# Patient Record
Sex: FEMALE | Race: WHITE | NOT HISPANIC OR LATINO | URBAN - METROPOLITAN AREA
[De-identification: names, ages, dates, MRNs, and addresses within clinical notes are randomized per-mention and may not be internally consistent; named-entity substitution may affect disease eponyms.]

---

## 2017-01-18 ENCOUNTER — APPOINTMENT (RX ONLY)
Dept: URBAN - METROPOLITAN AREA CLINIC 156 | Facility: CLINIC | Age: 77
Setting detail: DERMATOLOGY
End: 2017-01-18

## 2017-01-18 VITALS
SYSTOLIC BLOOD PRESSURE: 135 MMHG | HEIGHT: 65.5 IN | DIASTOLIC BLOOD PRESSURE: 79 MMHG | HEART RATE: 70 BPM | WEIGHT: 150 LBS

## 2017-01-18 DIAGNOSIS — D22 MELANOCYTIC NEVI: ICD-10-CM

## 2017-01-18 DIAGNOSIS — L82.1 OTHER SEBORRHEIC KERATOSIS: ICD-10-CM

## 2017-01-18 DIAGNOSIS — L57.0 ACTINIC KERATOSIS: ICD-10-CM

## 2017-01-18 DIAGNOSIS — D18.0 HEMANGIOMA: ICD-10-CM

## 2017-01-18 PROBLEM — D22.5 MELANOCYTIC NEVI OF TRUNK: Status: ACTIVE | Noted: 2017-01-18

## 2017-01-18 PROBLEM — D18.01 HEMANGIOMA OF SKIN AND SUBCUTANEOUS TISSUE: Status: ACTIVE | Noted: 2017-01-18

## 2017-01-18 PROBLEM — I10 ESSENTIAL (PRIMARY) HYPERTENSION: Status: ACTIVE | Noted: 2017-01-18

## 2017-01-18 PROCEDURE — ? COUNSELING

## 2017-01-18 PROCEDURE — 17000 DESTRUCT PREMALG LESION: CPT

## 2017-01-18 PROCEDURE — 99214 OFFICE O/P EST MOD 30 MIN: CPT | Mod: 25

## 2017-01-18 PROCEDURE — ? LIQUID NITROGEN

## 2017-01-18 ASSESSMENT — LOCATION SIMPLE DESCRIPTION DERM
LOCATION SIMPLE: RIGHT BREAST
LOCATION SIMPLE: RIGHT CHEEK
LOCATION SIMPLE: LEFT FOREARM
LOCATION SIMPLE: RIGHT FOREARM
LOCATION SIMPLE: LEFT UPPER BACK
LOCATION SIMPLE: ABDOMEN
LOCATION SIMPLE: LEFT LOWER BACK

## 2017-01-18 ASSESSMENT — LOCATION DETAILED DESCRIPTION DERM
LOCATION DETAILED: LEFT SUPERIOR MEDIAL LOWER BACK
LOCATION DETAILED: RIGHT DISTAL DORSAL FOREARM
LOCATION DETAILED: RIGHT MEDIAL MANDIBULAR CHEEK
LOCATION DETAILED: LEFT DISTAL DORSAL FOREARM
LOCATION DETAILED: RIGHT LATERAL BREAST 6-7:00 REGION
LOCATION DETAILED: EPIGASTRIC SKIN
LOCATION DETAILED: LEFT SUPERIOR UPPER BACK

## 2017-01-18 ASSESSMENT — LOCATION ZONE DERM
LOCATION ZONE: FACE
LOCATION ZONE: ARM
LOCATION ZONE: TRUNK

## 2017-01-18 NOTE — PROCEDURE: LIQUID NITROGEN
Post-Care Instructions: I reviewed with the patient in detail post-care instructions. Patient is to wear sunprotection, and avoid picking at any of the treated lesions. Pt may apply Vaseline to crusted or scabbing areas.
Duration Of Freeze Thaw-Cycle (Seconds): 0
Render Post-Care Instructions In Note?: no
Detail Level: Detailed
Consent: The patient's consent was obtained including but not limited to risks of crusting, scabbing, blistering, scarring, darker or lighter pigmentary change, recurrence, incomplete removal and infection.
Number Of Freeze-Thaw Cycles: 1 freeze-thaw cycle

## 2017-12-19 ENCOUNTER — HOSPITAL ENCOUNTER (OUTPATIENT)
Dept: HOSPITAL 88 - MRI | Age: 77
End: 2017-12-19
Attending: FAMILY MEDICINE
Payer: MEDICARE

## 2017-12-19 DIAGNOSIS — M48.061: Primary | ICD-10-CM

## 2017-12-19 DIAGNOSIS — M51.36: ICD-10-CM

## 2017-12-19 PROCEDURE — 72148 MRI LUMBAR SPINE W/O DYE: CPT

## 2017-12-22 NOTE — DIAGNOSTIC IMAGING REPORT
Examination: MRI SPINE LUMBAR WITHOUT CONTRAST



History: 77-year-old female with chronic back pain radiating down the right

lower extremity.

Comparison studies: X-rays of lumbar spine performed November 26, 2017 and

December 31, 2010.



Technique: 

Sagittal, coronal  and axial T2 , sagittal T1 and STIR; axial  spin density

oblique.



Findings:



Number of lumbar vertebral bodies: Five.



Alignment: Normal lordosis. Mild leftward curvature centered at L3-L4.

Soft tissues: No T2 hyperintense inflammatory changes.

Posterior paraspinal soft tissues and muscles: No abnormality. 

Lower thoracic cord: Normal in signal and morphology. The tip of the conus is

at L1-L2.

Cauda equina: No masses.  No arachnoiditis.



Vertebrae: 

No fractures, infection or neoplasm.



Degenerative changes:



L1-L2:

Asymmetric to the right disc bulge results in mild canal stenosis and moderate

right and mild left neural foraminal narrowing.



L2-L3:

Asymmetric to the right disc bulge results in moderate right and mild left

neural foraminal narrowing and mild canal stenosis.



L3-L4:

Asymmetric to the right disc bulge, mild bilateral facet arthropathy and

prominent posterior epidural fat result in moderate right neural foraminal

narrowing and moderate canal stenosis. 

No left foraminal narrowing.



L4-L5:

Diffuse disc bulge with central disc protrusion and mild bilateral facet

arthropathy result in mild bilateral neural foraminal narrowing and severe

canal stenosis.



L5-S1:

Diffuse disc bulge and mild bilateral facet arthropathy result in moderate left

neural foraminal narrowing. 

No right foraminal or canal stenosis.



IMPRESSION: 



1.  Degenerative changes at L2-L5 S1 with severe canal stenosis at L4-L5,

moderate canal stenosis at L3-L4 and mild canal stenosis at L1-L2 and L2-L3.

2.  Moderate right foraminal narrowing from L1-L2 through L3-L4 and moderate

left foraminal narrowing at L5-S1.

3.  Mild leftward curvature centered at L3-L4.



Signed by: Dr. Nimo Vergara M.D. on 12/22/2017 3:59 PM

## 2018-10-08 ENCOUNTER — APPOINTMENT (RX ONLY)
Dept: URBAN - METROPOLITAN AREA CLINIC 156 | Facility: CLINIC | Age: 78
Setting detail: DERMATOLOGY
End: 2018-10-08

## 2018-10-08 ENCOUNTER — RX ONLY (OUTPATIENT)
Age: 78
Setting detail: RX ONLY
End: 2018-10-08

## 2018-10-08 VITALS
RESPIRATION RATE: 14 BRPM | DIASTOLIC BLOOD PRESSURE: 72 MMHG | SYSTOLIC BLOOD PRESSURE: 136 MMHG | WEIGHT: 140 LBS | HEART RATE: 78 BPM

## 2018-10-08 DIAGNOSIS — L24.9 IRRITANT CONTACT DERMATITIS, UNSPECIFIED CAUSE: ICD-10-CM

## 2018-10-08 DIAGNOSIS — B07.8 OTHER VIRAL WARTS: ICD-10-CM

## 2018-10-08 DIAGNOSIS — B35.1 TINEA UNGUIUM: ICD-10-CM

## 2018-10-08 DIAGNOSIS — L82.1 OTHER SEBORRHEIC KERATOSIS: ICD-10-CM

## 2018-10-08 DIAGNOSIS — D22 MELANOCYTIC NEVI: ICD-10-CM

## 2018-10-08 DIAGNOSIS — L57.0 ACTINIC KERATOSIS: ICD-10-CM

## 2018-10-08 PROBLEM — D48.5 NEOPLASM OF UNCERTAIN BEHAVIOR OF SKIN: Status: ACTIVE | Noted: 2018-10-08

## 2018-10-08 PROBLEM — D22.5 MELANOCYTIC NEVI OF TRUNK: Status: ACTIVE | Noted: 2018-10-08

## 2018-10-08 PROCEDURE — ? NAIL CLIPPING FOR PAS

## 2018-10-08 PROCEDURE — ? SHAVE REMOVAL

## 2018-10-08 PROCEDURE — 11100: CPT | Mod: 59

## 2018-10-08 PROCEDURE — 17000 DESTRUCT PREMALG LESION: CPT

## 2018-10-08 PROCEDURE — ? COUNSELING

## 2018-10-08 PROCEDURE — 99214 OFFICE O/P EST MOD 30 MIN: CPT | Mod: 25

## 2018-10-08 PROCEDURE — ? CONSULTATION FOR MOHS SURGERY

## 2018-10-08 PROCEDURE — ? PRESCRIPTION

## 2018-10-08 PROCEDURE — 17003 DESTRUCT PREMALG LES 2-14: CPT

## 2018-10-08 PROCEDURE — ? BIOPSY BY SHAVE METHOD

## 2018-10-08 PROCEDURE — 11301 SHAVE SKIN LESION 0.6-1.0 CM: CPT | Mod: 59

## 2018-10-08 PROCEDURE — ? LIQUID NITROGEN

## 2018-10-08 RX ORDER — TRIAMCINOLONE ACETONIDE 1 MG/G
CREAM TOPICAL
Qty: 1 | Refills: 1 | Status: ERX

## 2018-10-08 RX ORDER — TRIAMCINOLONE ACETONIDE 1 MG/G
CREAM TOPICAL
Qty: 1 | Refills: 2 | Status: ERX | COMMUNITY
Start: 2018-10-08

## 2018-10-08 RX ADMIN — TRIAMCINOLONE ACETONIDE: 1 CREAM TOPICAL at 00:00

## 2018-10-08 ASSESSMENT — LOCATION SIMPLE DESCRIPTION DERM
LOCATION SIMPLE: RIGHT CHEEK
LOCATION SIMPLE: NOSE
LOCATION SIMPLE: LEFT THIGH
LOCATION SIMPLE: RIGHT UPPER BACK
LOCATION SIMPLE: LEFT 2ND TOE
LOCATION SIMPLE: LOWER BACK

## 2018-10-08 ASSESSMENT — LOCATION ZONE DERM
LOCATION ZONE: TRUNK
LOCATION ZONE: FACE
LOCATION ZONE: LEG
LOCATION ZONE: NOSE
LOCATION ZONE: TOENAIL

## 2018-10-08 ASSESSMENT — LOCATION DETAILED DESCRIPTION DERM
LOCATION DETAILED: NASAL TIP
LOCATION DETAILED: RIGHT SUPERIOR MEDIAL UPPER BACK
LOCATION DETAILED: RIGHT SUPERIOR LATERAL BUCCAL CHEEK
LOCATION DETAILED: SUPERIOR LUMBAR SPINE
LOCATION DETAILED: LEFT ANTERIOR PROXIMAL THIGH
LOCATION DETAILED: RIGHT CENTRAL BUCCAL CHEEK
LOCATION DETAILED: RIGHT INFERIOR MEDIAL UPPER BACK
LOCATION DETAILED: LEFT 2ND TOENAIL

## 2018-10-08 NOTE — PROCEDURE: NAIL CLIPPING FOR PAS
Lab Facility: [id number]
Detail Level: Detailed
Add [id number] To Bill?: No
Billing Type: Third-Party Bill
Lab: [id number]

## 2018-10-08 NOTE — PROCEDURE: BIOPSY BY SHAVE METHOD
Cryotherapy Text: The wound bed was treated with cryotherapy after the biopsy was performed.
Silver Nitrate Text: The wound bed was treated with silver nitrate after the biopsy was performed.
Bill [id number] For Specimen Handling/Conveyance To Laboratory?: no
Detail Level: Detailed
Additional Anesthesia Volume In Cc (Will Not Render If 0): 0
Notification Instructions: Patient will be notified of biopsy results. However, patient instructed to call the office if not contacted within 2 weeks.
Electrodesiccation And Curettage Text: The wound bed was treated with electrodesiccation and curettage after the biopsy was performed.
Anesthesia Volume In Cc (Will Not Render If 0): 0.5
Type Of Destruction Used: Curettage
Consent: Verbal consent was obtained and risks were reviewed including but not limited to scarring, infection, bleeding, scabbing, incomplete removal, nerve damage and allergy to anesthesia.
Wound Care: Bacitracin
Biopsy Type: H and E
Dressing: bandage
Was A Bandage Applied: Yes
Curettage Text: The wound bed was treated with curettage after the biopsy was performed.
X Size Of Lesion In Cm: 0.6
Lab Facility: [id number]
Post-Care Instructions: I reviewed with the patient in detail post-care instructions. Patient to remove bandage in 24 hours. Patient may apply hydrogen peroxide soaks to remove any crusting.  Cleanse site with mild soap and water.  Apply vaseline ointment to site once a day and cover with bandage.
Biopsy Method: Personna blade
Anesthesia Type: 2% lidocaine with epinephrine
Hemostasis: Electrocautery and Aluminum Chloride
Electrodesiccation Text: The wound bed was treated with electrodesiccation after the biopsy was performed.
Lab: [id number]
Billing Type: Third-Party Bill
Depth Of Biopsy: dermis

## 2018-10-08 NOTE — PROCEDURE: SHAVE REMOVAL
Lab: [id number]
Post-Care Instructions: I reviewed with the patient in detail post-care instructions. Patient is to keep the biopsy site dry overnight, and then apply bacitracin twice daily until healed. Patient may apply hydrogen peroxide soaks to remove any crusting.
Bill For Surgical Tray: no
Medical Necessity Information: It is in your best interest to select a reason for this procedure from the list below. All of these items fulfill various CMS LCD requirements except the new and changing color options.
Wound Care: Bacitracin
Lab Facility: [id number]
Biopsy Method: Dermablade
Billing Type: Third-Party Bill
Was A Bandage Applied: Yes
Notification Instructions: Patient will be notified of biopsy results. However, patient instructed to call the office if not contacted within 2 weeks.
Path Notes (To The Dermatopathologist): Please check margins.
Anesthesia Volume In Cc: 2
Hemostasis: Electrocautery
Detail Level: Detailed
Anesthesia Type: 2% lidocaine with epinephrine
X Size Of Lesion In Cm (Optional): 0.7
Medical Necessity Clause: This procedure was medically necessary because the lesion that was treated was:
Consent was obtained from the patient. The risks and benefits to therapy were discussed in detail. Specifically, the risks of infection, scarring, bleeding, prolonged wound healing, incomplete removal, allergy to anesthesia, nerve injury and recurrence were addressed. Prior to the procedure, the treatment site was clearly identified and confirmed by the patient. All components of Universal Protocol/PAUSE Rule completed.
Size Of Lesion In Cm (Required): 1

## 2018-11-01 ENCOUNTER — APPOINTMENT (RX ONLY)
Dept: URBAN - METROPOLITAN AREA SURGERY CENTER 12 | Facility: SURGERY CENTER | Age: 78
Setting detail: DERMATOLOGY
End: 2018-11-01

## 2018-11-01 VITALS — RESPIRATION RATE: 16 BRPM | SYSTOLIC BLOOD PRESSURE: 147 MMHG | HEART RATE: 70 BPM | DIASTOLIC BLOOD PRESSURE: 89 MMHG

## 2018-11-01 VITALS
DIASTOLIC BLOOD PRESSURE: 82 MMHG | HEART RATE: 66 BPM | RESPIRATION RATE: 14 BRPM | WEIGHT: 145 LBS | SYSTOLIC BLOOD PRESSURE: 150 MMHG

## 2018-11-01 PROBLEM — D48.5 NEOPLASM OF UNCERTAIN BEHAVIOR OF SKIN: Status: ACTIVE | Noted: 2018-11-01

## 2018-11-01 PROBLEM — C44.319 BASAL CELL CARCINOMA OF SKIN OF OTHER PARTS OF FACE: Status: ACTIVE | Noted: 2018-11-01

## 2018-11-01 PROCEDURE — ? MOHS SURGERY

## 2018-11-01 PROCEDURE — 13131 CMPLX RPR F/C/C/M/N/AX/G/H/F: CPT

## 2018-11-01 PROCEDURE — ? NURSING SURGICAL NOTE

## 2018-11-01 PROCEDURE — 17311 MOHS 1 STAGE H/N/HF/G: CPT

## 2018-11-01 PROCEDURE — ? DISCHARGE ORDERS

## 2018-11-01 PROCEDURE — ? BIOPSY BY SHAVE METHOD

## 2018-11-01 PROCEDURE — 11100: CPT | Mod: 59

## 2018-11-01 NOTE — PROCEDURE: DISCHARGE ORDERS
Follow-Up: 7 days
Detail Level: Detailed
Discharge Destination: home
Discharge Under Care Of (Will Render 'self' If Left Blank): spouse

## 2018-11-01 NOTE — PROCEDURE: BIOPSY BY SHAVE METHOD
Depth Of Biopsy: dermis
Cryotherapy Text: The wound bed was treated with cryotherapy after the biopsy was performed.
Consent: Verbal consent was obtained and risks were reviewed including but not limited to scarring, infection, bleeding, scabbing, incomplete removal, nerve damage and allergy to anesthesia.
X Size Of Lesion In Cm: 0.2
Electrodesiccation And Curettage Text: The wound bed was treated with electrodesiccation and curettage after the biopsy was performed.
Bill For Surgical Tray: no
Lab: [id number]
Dressing: bandage
Electrodesiccation Text: The wound bed was treated with electrodesiccation after the biopsy was performed.
Biopsy Type: H and E
Was A Bandage Applied: Yes
Notification Instructions: Patient will be notified of biopsy results. However, patient instructed to call the office if not contacted within 2 weeks.
Post-Care Instructions: I reviewed with the patient in detail post-care instructions. Patient to remove bandage in 24 hours. Patient may apply hydrogen peroxide soaks to remove any crusting.  Cleanse site with mild soap and water.  Apply vaseline ointment to site once a day and cover with bandage.
Biopsy Method: Personna blade
Curettage Text: The wound bed was treated with curettage after the biopsy was performed.
Billing Type: Third-Party Bill
Hemostasis: Electrocautery
Wound Care: Bacitracin
Additional Anesthesia Volume In Cc (Will Not Render If 0): 0
Silver Nitrate Text: The wound bed was treated with silver nitrate after the biopsy was performed.
Anesthesia Volume In Cc (Will Not Render If 0): 0.5
Detail Level: Detailed
Anesthesia Type: 1% lidocaine with epinephrine
Type Of Destruction Used: Curettage
Lab Facility: [id number]

## 2018-11-01 NOTE — PROCEDURE: NURSING SURGICAL NOTE
Detail Level: Detailed
Anesthesia #1 Volume In Cc: 2
Patient Discharged To: Spouse
Site Marked?: Yes
Time 'time-Out' Performed: 2663
Preoperative Diagnosis (For...Diagnosis Name): Mohs Micrographic Surgery for
Time Given #1: 8766
Time Given #2: 7909
Anesthesia #4 Volume In Cc: 0
Anesthesia #1 Type: 1% lidocaine with epinephrine
Surgeon: Jess Burnham MD
Anesthesia #3 Volume In Cc: 1
Dicharge Condition: Stable
Proposed Procedure: Mohs Surgery and Repair
Asa Clasification: I
Discharge Instructions (Will Render On Patient Hand-Out): 1. Supplies- You will need the following: \\n       • Water & Peroxide      \\n       • Non-Adherent Dressing or Band-Aids \\n       • Q-Tips                      \\n       • Vaseline \\n2. Wound Care\\n➢ CLEAN wound once DAILY after the pressure dressing is removed. \\n      • Clean wound with Q-Tips soaked in ½ water, ½ hydrogen peroxide. \\n      • Do not reuse Q-Tips. \\n      • Remove all crusted or white/yellow material that can come off easily.\\n      • After cleaning, generously APPLY VASELINE with a clean Q-Tip.\\n➢ Keep bandage dry \\n➢ COVER WOUND with a bandaid OR non-adherent dressing (cut to size & tape)\\n  o Keep WRAP in place for 24 hours.\\n  o Keep pressure DRESSING dry and intact ☐ 24 hours  ☐ 48 hours\\n  o Leave STERI-STRIPS in place \\n      o until they fall off   \\n      o _________________\\nContinue wound care  \\n      o until sutures are removed  \\n      o until healed or as your doctor directs\\n  o Apply ice packs, 20 minutes on, 20 minutes off for the next 24-48 hours while awake.\\n  o If repair includes a skin graft and you smoke, discontinue smoking for 1 month after your graft. \\n3. Personal Hygiene\\n    A. Showers or baths are allowed as long as the bandage remains dry, as directed. After 24hrs, the sutures may get wet; do NOT soak in water (i.e. baths, sinks, hot tubs or swimming pools/lakes).\\n    B. Heavy lifting and exercise are not allowed until the sutures are removed and/or the wound is healed. \\n4. Prescriptions \\n➢ Unless the doctor states otherwise, take 1-2 Extra Strength Tylenol every 6hrs as needed for pain. \\n➢ Alcohol should be avoided for two days.\\n  o Your doctor has prescribed an antibiotic for you to begin taking today as directed. \\n  o Your doctor has prescribed a pain pill for you to take as directed. \\n5. Potential Post-operative complications\\n➢ INFECTION: Infection seldom occurs when the wound care instructions have been carefully followed. Signs of infection include increasing redness, increased warmth, increasing pain, and white/yellow/green discharge. Contact our office if you experience one of these symptoms. \\n➢ BLEEDING: Bleeding can occur following surgery. To reduce the possibility of bleeding, follow these instructions:\\n          A. Limit your activities for at least 24 hours\\n          B. Keep the surgery site elevated\\n          C. If surgery was on the face, head, or neck:\\n                 I. Avoid stooping or bending\\n                II. Avoid Straining to have bowel movement\\n               III. Sleep with your head and shoulders elevated on extra pillows\\nIF BLEEDING OCCURS, apply firm constant pressure on the bandage for 20 minutes. That will usually stop minor bleeding. If area continues to bleed, call our office [phone] during business hours. Call our emergency physician on-call number [phone] during evenings, weekends, and holidays.
Time Discharged: 1440

## 2018-11-01 NOTE — PROCEDURE: MOHS SURGERY
Consent (Lip)/Introductory Paragraph: The rationale for Mohs was explained to the patient and consent was obtained. The risks, benefits and alternatives to therapy were discussed in detail. Specifically, the risks of lip deformity, changes in the oral aperture, infection, scarring, bleeding, prolonged wound healing, incomplete removal, allergy to anesthesia, nerve injury and recurrence were addressed. Prior to the procedure, the treatment site was clearly identified and confirmed by the patient. All components of Universal Protocol/PAUSE Rule completed.
Mercedes Flap Text: The defect edges were debeveled with a #15 scalpel blade.  Given the location of the defect, shape of the defect and the proximity to free margins a Mercedes flap was deemed most appropriate.  Using a sterile surgical marker, an appropriate advancement flap was drawn incorporating the defect and placing the expected incisions within the relaxed skin tension lines where possible. The area thus outlined was incised deep to adipose tissue with a #15 scalpel blade.  The skin margins were undermined to an appropriate distance in all directions utilizing iris scissors.
Referred To Otolaryngology For Closure Text (Leave Blank If You Do Not Want): After obtaining clear surgical margins the patient was sent to otolaryngology for surgical repair.  The patient understands they will receive post-surgical care and follow-up from the referring physician's office.
Keystone Flap Text: The defect edges were debeveled with a #15 scalpel blade.  Given the location of the defect, shape of the defect a keystone flap was deemed most appropriate.  Using a sterile surgical marker, an appropriate keystone flap was drawn incorporating the defect, outlining the appropriate donor tissue and placing the expected incisions within the relaxed skin tension lines where possible. The area thus outlined was incised deep to adipose tissue with a #15 scalpel blade.  The skin margins were undermined to an appropriate distance in all directions around the primary defect and laterally outward around the flap utilizing iris scissors.
Full Thickness Lip Wedge Repair (Flap) Text: Given the location of the defect and the proximity to free margins a full thickness wedge repair was deemed most appropriate.  Using a sterile surgical marker, the appropriate repair was drawn incorporating the defect and placing the expected incisions perpendicular to the vermilion border.  The vermilion border was also meticulously outlined to ensure appropriate reapproximation during the repair.  The area thus outlined was incised through and through with a #15 scalpel blade.  The muscularis and dermis were reaproximated with deep sutures following hemostasis. Care was taken to realign the vermilion border before proceeding with the superficial closure.  Once the vermilion was realigned the superfical and mucosal closure was finished.
Quadrant Reporting?: no
O-Z Plasty Text: The defect edges were debeveled with a #15 scalpel blade.  Given the location of the defect, shape of the defect and the proximity to free margins an O-Z plasty (double transposition flap) was deemed most appropriate.  Using a sterile surgical marker, the appropriate transposition flaps were drawn incorporating the defect and placing the expected incisions within the relaxed skin tension lines where possible.    The area thus outlined was incised deep to adipose tissue with a #15 scalpel blade.  The skin margins were undermined to an appropriate distance in all directions utilizing iris scissors.  Hemostasis was achieved with electrocautery.  The flaps were then transposed into place, one clockwise and the other counterclockwise, and anchored with interrupted buried subcutaneous sutures.
Plastic Surgeon Procedure Text (E): After obtaining clear surgical margins the patient was sent to plastics for surgical repair.  The patient understands they will receive post-surgical care and follow-up from the referring physician's office.
Show Previous Accession Variable: Yes
Repair Type: Complex Repair
Stage 2: Number Of Blocks?: 0
W Plasty Text: The lesion was extirpated to the level of the fat with a #15 scalpel blade.  Given the location of the defect, shape of the defect and the proximity to free margins a W-plasty was deemed most appropriate for repair.  Using a sterile surgical marker, the appropriate transposition arms of the W-plasty were drawn incorporating the defect and placing the expected incisions within the relaxed skin tension lines where possible.    The area thus outlined was incised deep to adipose tissue with a #15 scalpel blade.  The skin margins were undermined to an appropriate distance in all directions utilizing iris scissors.  The opposing transposition arms were then transposed into place in opposite direction and anchored with interrupted buried subcutaneous sutures.
Non-Graft Cartilage Fenestration Text: The cartilage was fenestrated with a 2mm punch biopsy to help facilitate healing.
Melolabial Interpolation Flap Text: A decision was made to reconstruct the defect utilizing an interpolation axial flap and a staged reconstruction.  A telfa template was made of the defect.  This telfa template was then used to outline the melolabial interpolation flap.  The donor area for the pedicle flap was then injected with anesthesia.  The flap was excised through the skin and subcutaneous tissue down to the layer of the underlying musculature.  The pedicle flap was carefully excised within this deep plane to maintain its blood supply.  The edges of the donor site were undermined.   The donor site was closed in a primary fashion.  The pedicle was then rotated into position and sutured.  Once the tube was sutured into place, adequate blood supply was confirmed with blanching and refill.  The pedicle was then wrapped with xeroform gauze and dressed appropriately with a telfa and gauze bandage to ensure continued blood supply and protect the attached pedicle.
Double M-Plasty Complex Repair Preamble Text (Leave Blank If You Do Not Want): Extensive wide undermining was performed.
Graft Basting Suture (Optional): 5-0 Prolene
Bilobed Transposition Flap Text: The defect edges were debeveled with a #15 scalpel blade.  Given the location of the defect and the proximity to free margins a bilobed transposition flap was deemed most appropriate.  Using a sterile surgical marker, an appropriate bilobe flap drawn around the defect.    The area thus outlined was incised deep to adipose tissue with a #15 scalpel blade.  The skin margins were undermined to an appropriate distance in all directions utilizing iris scissors.
Mid-Level Procedure Text (C): After obtaining clear surgical margins the patient was sent to a mid-level provider for surgical repair.  The patient understands they will receive post-surgical care and follow-up from the mid-level provider.
Simple / Intermediate / Complex Repair - Final Wound Length In Cm: 2.5
Stage 1: Number Of Blocks?: 2
Purse String (Simple) Text: Given the location of the defect and the characteristics of the surrounding skin a pursestring closure was deemed most appropriate.  Undermining was performed circumfirentially around the surgical defect.  A purstring suture was then placed and tightened.
Otolaryngologist Procedure Text (D): After obtaining clear surgical margins the patient was sent to otolaryngology for surgical repair.  The patient understands they will receive post-surgical care and follow-up from the referring physician's office.
Stage 2: Additional Anesthesia Type: 1% lidocaine with epinephrine
Epidermal Autograft Text: The defect edges were debeveled with a #15 scalpel blade.  Given the location of the defect, shape of the defect and the proximity to free margins an epidermal autograft was deemed most appropriate.  Using a sterile surgical marker, the primary defect shape was transferred to the donor site. The epidermal graft was then harvested.  The skin graft was then placed in the primary defect and oriented appropriately.
Depth Of Tumor Invasion (For Histology): tumor not visualized (deep and peripheral margins are clear of tumor)
Transposition Flap Text: The defect edges were debeveled with a #15 scalpel blade.  Given the location of the defect and the proximity to free margins a transposition flap was deemed most appropriate.  Using a sterile surgical marker, an appropriate transposition flap was drawn incorporating the defect.    The area thus outlined was incised deep to adipose tissue with a #15 scalpel blade.  The skin margins were undermined to an appropriate distance in all directions utilizing iris scissors.
Burow's Advancement Flap Text: The defect edges were debeveled with a #15 scalpel blade.  Given the location of the defect and the proximity to free margins a Burow's advancement flap was deemed most appropriate.  Using a sterile surgical marker, the appropriate advancement flap was drawn incorporating the defect and placing the expected incisions within the relaxed skin tension lines where possible.    The area thus outlined was incised deep to adipose tissue with a #15 scalpel blade.  The skin margins were undermined to an appropriate distance in all directions utilizing iris scissors.
Number Of Stages: 1
Inflammation Suggestive Of Cancer Camouflage Histology Text: There was a dense lymphocytic infiltrate which prevented adequate histologic evaluation of adjacent structures.
Mohs Histo Method Verbiage: Each section was then chromacoded and processed in the Mohs lab using the Mohs protocol and submitted for frozen section.
Xenograft Text: The defect edges were debeveled with a #15 scalpel blade.  Given the location of the defect, shape of the defect and the proximity to free margins a xenograft was deemed most appropriate.  The graft was then trimmed to fit the size of the defect.  The graft was then placed in the primary defect and oriented appropriately.
Oculoplastic Surgeon Procedure Text (B): After obtaining clear surgical margins the patient was sent to oculoplastics for surgical repair.  The patient understands they will receive post-surgical care and follow-up from the referring physician's office.
Rotation Flap Text: The defect edges were debeveled with a #15 scalpel blade.  Given the location of the defect, shape of the defect and the proximity to free margins a rotation flap was deemed most appropriate.  Using a sterile surgical marker, an appropriate rotation flap was drawn incorporating the defect and placing the expected incisions within the relaxed skin tension lines where possible.    The area thus outlined was incised deep to adipose tissue with a #15 scalpel blade.  The skin margins were undermined to an appropriate distance in all directions utilizing iris scissors.
O-T Advancement Flap Text: The defect edges were debeveled with a #15 scalpel blade.  Given the location of the defect, shape of the defect and the proximity to free margins an O-T advancement flap was deemed most appropriate.  Using a sterile surgical marker, an appropriate advancement flap was drawn incorporating the defect and placing the expected incisions within the relaxed skin tension lines where possible.    The area thus outlined was incised deep to adipose tissue with a #15 scalpel blade.  The skin margins were undermined to an appropriate distance in all directions utilizing iris scissors.
Asc Procedure Text (C): After obtaining clear surgical margins the patient was sent to an ASC for surgical repair.  The patient understands they will receive post-surgical care and follow-up from the ASC physician.
Island Pedicle Flap With Canthal Suspension Text: The defect edges were debeveled with a #15 scalpel blade.  Given the location of the defect, shape of the defect and the proximity to free margins an island pedicle advancement flap was deemed most appropriate.  Using a sterile surgical marker, an appropriate advancement flap was drawn incorporating the defect, outlining the appropriate donor tissue and placing the expected incisions within the relaxed skin tension lines where possible. The area thus outlined was incised deep to adipose tissue with a #15 scalpel blade.  The skin margins were undermined to an appropriate distance in all directions around the primary defect and laterally outward around the island pedicle utilizing iris scissors.  There was minimal undermining beneath the pedicle flap. A suspension suture was placed in the canthal tendon to prevent tension and prevent ectropion.
Graft Donor Site Dermal Sutures (Optional): 5-0 Vicryl
Composite Graft Text: The defect edges were debeveled with a #15 scalpel blade.  Given the location of the defect, shape of the defect, the proximity to free margins and the fact the defect was full thickness a composite graft was deemed most appropriate.  The defect was outline and then transferred to the donor site.  A full thickness graft was then excised from the donor site. The graft was then placed in the primary defect, oriented appropriately and then sutured into place.  The secondary defect was then repaired using a primary closure.
Scc Histology Text: There are buds, cords, and irregularly shaped lobules of atypical keratinocytes emanating from the undersurface of the epidermis into the reticular dermis.  Many of their nuclei are large, hyperchromatic, and plenomorphic with scattered atypical mitotic figures.
Consent 1/Introductory Paragraph: The rationale for Mohs was explained to the patient and consent was obtained. The risks, benefits and alternatives to therapy were discussed in detail. Specifically, the risks of infection, scarring, bleeding, prolonged wound healing, incomplete removal, allergy to anesthesia, nerve injury and recurrence were addressed. Prior to the procedure, the treatment site was clearly identified and confirmed by the patient. All components of Universal Protocol/PAUSE Rule completed.
Z Plasty Text: The lesion was extirpated to the level of the fat with a #15 scalpel blade.  Given the location of the defect, shape of the defect and the proximity to free margins a Z-plasty was deemed most appropriate for repair.  Using a sterile surgical marker, the appropriate transposition arms of the Z-plasty were drawn incorporating the defect and placing the expected incisions within the relaxed skin tension lines where possible.    The area thus outlined was incised deep to adipose tissue with a #15 scalpel blade.  The skin margins were undermined to an appropriate distance in all directions utilizing iris scissors.  The opposing transposition arms were then transposed into place in opposite direction and anchored with interrupted buried subcutaneous sutures.
Ear Wedge Repair Text: A wedge excision was completed by carrying down an excision through the full thickness of the ear and cartilage with an inward facing Burow's triangle. The wound was then closed in a layered fashion.
Ear Star Wedge Flap Text: The defect edges were debeveled with a #15 blade scalpel.  Given the location of the defect and the proximity to free margins (helical rim) an ear star wedge flap was deemed most appropriate.  Using a sterile surgical marker, the appropriate flap was drawn incorporating the defect and placing the expected incisions between the helical rim and antihelix where possible.  The area thus outlined was incised through and through with a #15 scalpel blade.
Consent (Near Eyelid Margin)/Introductory Paragraph: The rationale for Mohs was explained to the patient and consent was obtained. The risks, benefits and alternatives to therapy were discussed in detail. Specifically, the risks of ectropion or eyelid deformity, infection, scarring, bleeding, prolonged wound healing, incomplete removal, allergy to anesthesia, nerve injury and recurrence were addressed. Prior to the procedure, the treatment site was clearly identified and confirmed by the patient. All components of Universal Protocol/PAUSE Rule completed.
Localized Dermabrasion Text: The patient was draped in routine manner.  Localized dermabrasion using 3 x 17 mm wire brush was performed in routine manner to papillary dermis. This spot dermabrasion is being performed to complete skin cancer reconstruction. It also will eliminate the other sun damaged precancerous cells that are known to be part of the regional effect of a lifetime's worth of sun exposure. This localized dermabrasion is therapeutic and should not be considered cosmetic in any regard.
Hemostasis: Electrocautery
Provider Procedure Text (C): After obtaining clear surgical margins the defect was repaired by another provider.
Area M Indication Text: Tumors in this location are included in Area M (cheek, forehead, scalp, neck, jawline and pretibial skin).  Mohs surgery is indicated for tumors in these anatomic locations.
Mid-Level Procedure Text (F): After obtaining clear surgical margins the patient was sent to a mid-level provider for surgical repair.  The patient understands they will receive post-surgical care and follow-up from the mid-level provider.
Surgical Defect Width In Cm (Optional): 0.8
Crescentic Advancement Flap Text: The defect edges were debeveled with a #15 scalpel blade.  Given the location of the defect and the proximity to free margins a crescentic advancement flap was deemed most appropriate.  Using a sterile surgical marker, the appropriate advancement flap was drawn incorporating the defect and placing the expected incisions within the relaxed skin tension lines where possible.    The area thus outlined was incised deep to adipose tissue with a #15 scalpel blade.  The skin margins were undermined to an appropriate distance in all directions utilizing iris scissors.
Oculoplastic Surgeon Procedure Text (A): After obtaining clear surgical margins the patient was sent to oculoplastics for surgical repair.  The patient understands they will receive post-surgical care and follow-up from the referring physician's office.
Alternatives Discussed Intro (Do Not Add Period): I discussed alternative treatments to Mohs surgery and specifically discussed the risks and benefits of
Area L Indication Text: Tumors in this location are included in Area L (trunk and extremities).  Mohs surgery is indicated for larger tumors, or tumors with aggressive histologic features, in these anatomic locations.
Epidermal Closure: simple interrupted
Suture Removal: 7 days
Dressing: pressure dressing with telfa
Rhombic Flap Text: The defect edges were debeveled with a #15 scalpel blade.  Given the location of the defect and the proximity to free margins a rhombic flap was deemed most appropriate.  Using a sterile surgical marker, an appropriate rhombic flap was drawn incorporating the defect.    The area thus outlined was incised deep to adipose tissue with a #15 scalpel blade.  The skin margins were undermined to an appropriate distance in all directions utilizing iris scissors.
O-T Plasty Text: The defect edges were debeveled with a #15 scalpel blade.  Given the location of the defect, shape of the defect and the proximity to free margins an O-T plasty was deemed most appropriate.  Using a sterile surgical marker, an appropriate O-T plasty was drawn incorporating the defect and placing the expected incisions within the relaxed skin tension lines where possible.    The area thus outlined was incised deep to adipose tissue with a #15 scalpel blade.  The skin margins were undermined to an appropriate distance in all directions utilizing iris scissors.
Advancement-Rotation Flap Text: The defect edges were debeveled with a #15 scalpel blade.  Given the location of the defect, shape of the defect and the proximity to free margins an advancement-rotation flap was deemed most appropriate.  Using a sterile surgical marker, an appropriate flap was drawn incorporating the defect and placing the expected incisions within the relaxed skin tension lines where possible. The area thus outlined was incised deep to adipose tissue with a #15 scalpel blade.  The skin margins were undermined to an appropriate distance in all directions utilizing iris scissors.
Complex Repair And Flap Additional Text (Will Appearing After The Standard Complex Repair Text): The complex repair was not sufficient to completely close the primary defect. The remaining additional defect was repaired with the flap mentioned below.
S Plasty Text: Given the location and shape of the defect, and the orientation of relaxed skin tension lines, an S-plasty was deemed most appropriate for repair.  Using a sterile surgical marker, the appropriate outline of the S-plasty was drawn, incorporating the defect and placing the expected incisions within the relaxed skin tension lines where possible.  The area thus outlined was incised deep to adipose tissue with a #15 scalpel blade.  The skin margins were undermined to an appropriate distance in all directions utilizing iris scissors. The skin flaps were advanced over the defect.  The opposing margins were then approximated with interrupted buried subcutaneous sutures.
Plastic Surgeon Procedure Text (D): After obtaining clear surgical margins the patient was sent to plastics for surgical repair.  The patient understands they will receive post-surgical care and follow-up from the referring physician's office.
Interpolation Flap Text: A decision was made to reconstruct the defect utilizing an interpolation axial flap and a staged reconstruction.  A telfa template was made of the defect.  This telfa template was then used to outline the interpolation flap.  The donor area for the pedicle flap was then injected with anesthesia.  The flap was excised through the skin and subcutaneous tissue down to the layer of the underlying musculature.  The interpolation flap was carefully excised within this deep plane to maintain its blood supply.  The edges of the donor site were undermined.   The donor site was closed in a primary fashion.  The pedicle was then rotated into position and sutured.  Once the tube was sutured into place, adequate blood supply was confirmed with blanching and refill.  The pedicle was then wrapped with xeroform gauze and dressed appropriately with a telfa and gauze bandage to ensure continued blood supply and protect the attached pedicle.
Consent (Temporal Branch)/Introductory Paragraph: The rationale for Mohs was explained to the patient and consent was obtained. The risks, benefits and alternatives to therapy were discussed in detail. Specifically, the risks of damage to the temporal branch of the facial nerve, infection, scarring, bleeding, prolonged wound healing, incomplete removal, allergy to anesthesia, and recurrence were addressed. Prior to the procedure, the treatment site was clearly identified and confirmed by the patient. All components of Universal Protocol/PAUSE Rule completed.
Double M-Plasty Intermediate Repair Preamble Text (Leave Blank If You Do Not Want): Undermining was performed with blunt dissection.
Postop Diagnosis: same
Location Indication Override (Is Already Calculated Based On Selected Body Location): Area M
Partial Purse String (Intermediate) Text: Given the location of the defect and the characteristics of the surrounding skin an intermediate purse string closure was deemed most appropriate.  Undermining was performed circumfirentially around the surgical defect.  A purse string suture was then placed and tightened. Wound tension only allowed a partial closure of the circular defect.
X Size Of Lesion In Cm (Optional): 0.6
Mohs Rapid Report Verbiage: The area of clinically evident tumor was marked with skin marking ink and appropriately hatched.  The initial incision was made following the Mohs approach through the skin.  The specimen was taken to the lab, divided into the necessary number of pieces, chromacoded and processed according to the Mohs protocol.  This was repeated in successive stages until a tumor free defect was achieved.
Mohs Method Verbiage: An incision at a 45 degree angle following the standard Mohs approach was done and the specimen was harvested as a microscopic controlled layer.
Bcc Infiltrative Histology Text: There were numerous aggregates of basaloid cells demonstrating an infiltrative pattern within a very fibrotic dermis.  Some of the aggregates have peripheral palisading of their nuclei with foci of artifactual clefts  the aggregates from the surrounding stroma
Banner Transposition Flap Text: The defect edges were debeveled with a #15 scalpel blade.  Given the location of the defect and the proximity to free margins a Banner transposition flap was deemed most appropriate.  Using a sterile surgical marker, an appropriate flap drawn around the defect. The area thus outlined was incised deep to adipose tissue with a #15 scalpel blade.  The skin margins were undermined to an appropriate distance in all directions utilizing iris scissors.
Wound Care (No Sutures): Bacitracin
Bi-Rhombic Flap Text: The defect edges were debeveled with a #15 scalpel blade.  Given the location of the defect and the proximity to free margins a bi-rhombic flap was deemed most appropriate.  Using a sterile surgical marker, an appropriate rhombic flap was drawn incorporating the defect. The area thus outlined was incised deep to adipose tissue with a #15 scalpel blade.  The skin margins were undermined to an appropriate distance in all directions utilizing iris scissors.
Medical Necessity Statement: Based on my medical judgement, Mohs surgery is the most appropriate treatment for this cancer compared to other treatments.
Surgical Defect Length In Cm (Optional): 1.2
Asc Procedure Text (B): After obtaining clear surgical margins the patient was sent to an ASC for surgical repair.  The patient understands they will receive post-surgical care and follow-up from the ASC physician.
Alar Island Pedicle Flap Text: The defect edges were debeveled with a #15 scalpel blade.  Given the location of the defect, shape of the defect and the proximity to the alar rim an island pedicle advancement flap was deemed most appropriate.  Using a sterile surgical marker, an appropriate advancement flap was drawn incorporating the defect, outlining the appropriate donor tissue and placing the expected incisions within the nasal ala running parallel to the alar rim. The area thus outlined was incised with a #15 scalpel blade.  The skin margins were undermined minimally to an appropriate distance in all directions around the primary defect and laterally outward around the island pedicle utilizing iris scissors.  There was minimal undermining beneath the pedicle flap.
Star Wedge Flap Text: The defect edges were debeveled with a #15 scalpel blade.  Given the location of the defect, shape of the defect and the proximity to free margins a star wedge flap was deemed most appropriate.  Using a sterile surgical marker, an appropriate rotation flap was drawn incorporating the defect and placing the expected incisions within the relaxed skin tension lines where possible. The area thus outlined was incised deep to adipose tissue with a #15 scalpel blade.  The skin margins were undermined to an appropriate distance in all directions utilizing iris scissors.
Home Suture Removal Text: Patient was provided instructions on removing sutures and will remove their sutures at home.  If they have any questions or difficulties they will call the office.
Posterior Auricular Interpolation Flap Text: A decision was made to reconstruct the defect utilizing an interpolation axial flap and a staged reconstruction.  A telfa template was made of the defect.  This telfa template was then used to outline the posterior auricular interpolation flap.  The donor area for the pedicle flap was then injected with anesthesia.  The flap was excised through the skin and subcutaneous tissue down to the layer of the underlying musculature.  The pedicle flap was carefully excised within this deep plane to maintain its blood supply.  The edges of the donor site were undermined.   The donor site was closed in a primary fashion.  The pedicle was then rotated into position and sutured.  Once the tube was sutured into place, adequate blood supply was confirmed with blanching and refill.  The pedicle was then wrapped with xeroform gauze and dressed appropriately with a telfa and gauze bandage to ensure continued blood supply and protect the attached pedicle.
Dermal Autograft Text: The defect edges were debeveled with a #15 scalpel blade.  Given the location of the defect, shape of the defect and the proximity to free margins a dermal autograft was deemed most appropriate.  Using a sterile surgical marker, the primary defect shape was transferred to the donor site. The area thus outlined was incised deep to adipose tissue with a #15 scalpel blade.  The harvested graft was then trimmed of adipose and epidermal tissue until only dermis was left.  The skin graft was then placed in the primary defect and oriented appropriately.
Complex Repair And Graft Additional Text (Will Appearing After The Standard Complex Repair Text): The complex repair was not sufficient to completely close the primary defect. The remaining additional defect was repaired with the graft mentioned below.
Consent (Nose)/Introductory Paragraph: The rationale for Mohs was explained to the patient and consent was obtained. The risks, benefits and alternatives to therapy were discussed in detail. Specifically, the risks of nasal deformity, changes in the flow of air through the nose, infection, scarring, bleeding, prolonged wound healing, incomplete removal, allergy to anesthesia, nerve injury and recurrence were addressed. Prior to the procedure, the treatment site was clearly identified and confirmed by the patient. All components of Universal Protocol/PAUSE Rule completed.
Spiral Flap Text: The defect edges were debeveled with a #15 scalpel blade.  Given the location of the defect, shape of the defect and the proximity to free margins a spiral flap was deemed most appropriate.  Using a sterile surgical marker, an appropriate rotation flap was drawn incorporating the defect and placing the expected incisions within the relaxed skin tension lines where possible. The area thus outlined was incised deep to adipose tissue with a #15 scalpel blade.  The skin margins were undermined to an appropriate distance in all directions utilizing iris scissors.
Cartilage Graft Text: The defect edges were debeveled with a #15 scalpel blade.  Given the location of the defect, shape of the defect, the fact the defect involved a full thickness cartilage defect a cartilage graft was deemed most appropriate.  An appropriate donor site was identified, cleansed, and anesthetized. The cartilage graft was then harvested and transferred to the recipient site, oriented appropriately and then sutured into place.  The secondary defect was then repaired using a primary closure.
Island Pedicle Flap Text: The defect edges were debeveled with a #15 scalpel blade.  Given the location of the defect, shape of the defect and the proximity to free margins an island pedicle advancement flap was deemed most appropriate.  Using a sterile surgical marker, an appropriate advancement flap was drawn incorporating the defect, outlining the appropriate donor tissue and placing the expected incisions within the relaxed skin tension lines where possible.    The area thus outlined was incised deep to adipose tissue with a #15 scalpel blade.  The skin margins were undermined to an appropriate distance in all directions around the primary defect and laterally outward around the island pedicle utilizing iris scissors.  There was minimal undermining beneath the pedicle flap.
Skin Substitute Text: The defect edges were debeveled with a #15 scalpel blade.  Given the location of the defect, shape of the defect and the proximity to free margins a skin substitute graft was deemed most appropriate.  The graft material was trimmed to fit the size of the defect. The graft was then placed in the primary defect and oriented appropriately.
Advancement Flap (Single) Text: The defect edges were debeveled with a #15 scalpel blade.  Given the location of the defect and the proximity to free margins a single advancement flap was deemed most appropriate.  Using a sterile surgical marker, an appropriate advancement flap was drawn incorporating the defect and placing the expected incisions within the relaxed skin tension lines where possible.    The area thus outlined was incised deep to adipose tissue with a #15 scalpel blade.  The skin margins were undermined to an appropriate distance in all directions utilizing iris scissors.
Melolabial Transposition Flap Text: The defect edges were debeveled with a #15 scalpel blade.  Given the location of the defect and the proximity to free margins a melolabial flap was deemed most appropriate.  Using a sterile surgical marker, an appropriate melolabial transposition flap was drawn incorporating the defect.    The area thus outlined was incised deep to adipose tissue with a #15 scalpel blade.  The skin margins were undermined to an appropriate distance in all directions utilizing iris scissors.
Previous Accession (Optional): [id number]
A-T Advancement Flap Text: The defect edges were debeveled with a #15 scalpel blade.  Given the location of the defect, shape of the defect and the proximity to free margins an A-T advancement flap was deemed most appropriate.  Using a sterile surgical marker, an appropriate advancement flap was drawn incorporating the defect and placing the expected incisions within the relaxed skin tension lines where possible.    The area thus outlined was incised deep to adipose tissue with a #15 scalpel blade.  The skin margins were undermined to an appropriate distance in all directions utilizing iris scissors.
Closure 4 Information: This tab is for additional flaps and grafts above and beyond our usual structured repairs.  Please note if you enter information here it will not currently bill and you will need to add the billing information manually.
Post-Care Instructions: I reviewed with the patient in detail post-care instructions. Patient is not to engage in any heavy lifting, exercise, or swimming for the next 14 days. Should the patient develop any fevers, chills, bleeding, severe pain patient will contact the office immediately.
Scc In Situ Histology Text: There is full thickness atypia and loss of normal architectural pattern within the moderately thickened epidermis.  Many of the keratinocytes have large, hyperchromatic, or pleomorphic nuclei.  Scattered dyskeratotic cells and atypical mitotic figures are also present within the epidermis.
No Repair - Repaired With Adjacent Surgical Defect Text (Leave Blank If You Do Not Want): After obtaining clear surgical margins the defect was repaired concurrently with another surgical defect which was in close approximation.
Helical Rim Advancement Flap Text: The defect edges were debeveled with a #15 blade scalpel.  Given the location of the defect and the proximity to free margins (helical rim) a double helical rim advancement flap was deemed most appropriate.  Using a sterile surgical marker, the appropriate advancement flaps were drawn incorporating the defect and placing the expected incisions between the helical rim and antihelix where possible.  The area thus outlined was incised through and through with a #15 scalpel blade.  With a skin hook and iris scissors, the flaps were gently and sharply undermined and freed up.
V-Y Plasty Text: The defect edges were debeveled with a #15 scalpel blade.  Given the location of the defect, shape of the defect and the proximity to free margins an V-Y advancement flap was deemed most appropriate.  Using a sterile surgical marker, an appropriate advancement flap was drawn incorporating the defect and placing the expected incisions within the relaxed skin tension lines where possible.    The area thus outlined was incised deep to adipose tissue with a #15 scalpel blade.  The skin margins were undermined to an appropriate distance in all directions utilizing iris scissors.
Bcc Histology Text: There were numerous aggregates of basaloid cells with hyperchromatic nuclei and scant cytoplasm.  The aggregates have peripheral palisading of their nuclei and are embedded in a fibrotic and myxoid stroma.
V-Y Flap Text: The defect edges were debeveled with a #15 scalpel blade.  Given the location of the defect, shape of the defect and the proximity to free margins a V-Y flap was deemed most appropriate.  Using a sterile surgical marker, an appropriate advancement flap was drawn incorporating the defect and placing the expected incisions within the relaxed skin tension lines where possible.    The area thus outlined was incised deep to adipose tissue with a #15 scalpel blade.  The skin margins were undermined to an appropriate distance in all directions utilizing iris scissors.
Partial Purse String (Simple) Text: Given the location of the defect and the characteristics of the surrounding skin a simple purse string closure was deemed most appropriate.  Undermining was performed circumfirentially around the surgical defect.  A purse string suture was then placed and tightened. Wound tension only allowed a partial closure of the circular defect.
Consent (Ear)/Introductory Paragraph: The rationale for Mohs was explained to the patient and consent was obtained. The risks, benefits and alternatives to therapy were discussed in detail. Specifically, the risks of ear deformity, infection, scarring, bleeding, prolonged wound healing, incomplete removal, allergy to anesthesia, nerve injury and recurrence were addressed. Prior to the procedure, the treatment site was clearly identified and confirmed by the patient. All components of Universal Protocol/PAUSE Rule completed.
Paramedian Forehead Flap Text: A decision was made to reconstruct the defect utilizing an interpolation axial flap and a staged reconstruction.  A telfa template was made of the defect.  This telfa template was then used to outline the paramedian forehead pedicle flap.  The donor area for the pedicle flap was then injected with anesthesia.  The flap was excised through the skin and subcutaneous tissue down to the layer of the underlying musculature.  The pedicle flap was carefully excised within this deep plane to maintain its blood supply.  The edges of the donor site were undermined.   The donor site was closed in a primary fashion.  The pedicle was then rotated into position and sutured.  Once the tube was sutured into place, adequate blood supply was confirmed with blanching and refill.  The pedicle was then wrapped with xeroform gauze and dressed appropriately with a telfa and gauze bandage to ensure continued blood supply and protect the attached pedicle.
Bilobed Flap Text: The defect edges were debeveled with a #15 scalpel blade.  Given the location of the defect and the proximity to free margins a bilobe flap was deemed most appropriate.  Using a sterile surgical marker, an appropriate bilobe flap drawn around the defect.    The area thus outlined was incised deep to adipose tissue with a #15 scalpel blade.  The skin margins were undermined to an appropriate distance in all directions utilizing iris scissors.
Consent (Marginal Mandibular)/Introductory Paragraph: The rationale for Mohs was explained to the patient and consent was obtained. The risks, benefits and alternatives to therapy were discussed in detail. Specifically, the risks of damage to the marginal mandibular branch of the facial nerve, infection, scarring, bleeding, prolonged wound healing, incomplete removal, allergy to anesthesia, and recurrence were addressed. Prior to the procedure, the treatment site was clearly identified and confirmed by the patient. All components of Universal Protocol/PAUSE Rule completed.
Cheek-To-Nose Interpolation Flap Text: A decision was made to reconstruct the defect utilizing an interpolation axial flap and a staged reconstruction.  A telfa template was made of the defect.  This telfa template was then used to outline the Cheek-To-Nose Interpolation flap.  The donor area for the pedicle flap was then injected with anesthesia.  The flap was excised through the skin and subcutaneous tissue down to the layer of the underlying musculature.  The interpolation flap was carefully excised within this deep plane to maintain its blood supply.  The edges of the donor site were undermined.   The donor site was closed in a primary fashion.  The pedicle was then rotated into position and sutured.  Once the tube was sutured into place, adequate blood supply was confirmed with blanching and refill.  The pedicle was then wrapped with xeroform gauze and dressed appropriately with a telfa and gauze bandage to ensure continued blood supply and protect the attached pedicle.
Surgeon: Jess Burnham MD
Initial Size Of Lesion: 0.7
Split-Thickness Skin Graft Text: The defect edges were debeveled with a #15 scalpel blade.  Given the location of the defect, shape of the defect and the proximity to free margins a split thickness skin graft was deemed most appropriate.  Using a sterile surgical marker, the primary defect shape was transferred to the donor site. The split thickness graft was then harvested.  The skin graft was then placed in the primary defect and oriented appropriately.
Closure 2 Information: This tab is for additional flaps and grafts, including complex repair and grafts and complex repair and flaps. You can also specify a different location for the additional defect, if the location is the same you do not need to select a new one. We will insert the automated text for the repair you select below just as we do for solitary flaps and grafts. Please note that at this time if you select a location with a different insurance zone you will need to override the ICD10 and CPT if appropriate.
Double Island Pedicle Flap Text: The defect edges were debeveled with a #15 scalpel blade.  Given the location of the defect, shape of the defect and the proximity to free margins a double island pedicle advancement flap was deemed most appropriate.  Using a sterile surgical marker, an appropriate advancement flap was drawn incorporating the defect, outlining the appropriate donor tissue and placing the expected incisions within the relaxed skin tension lines where possible.    The area thus outlined was incised deep to adipose tissue with a #15 scalpel blade.  The skin margins were undermined to an appropriate distance in all directions around the primary defect and laterally outward around the island pedicle utilizing iris scissors.  There was minimal undermining beneath the pedicle flap.
Subsequent Stages Histo Method Verbiage: Using a similar technique to that described above, a thin layer of tissue was removed from all areas where tumor was visible on the previous stage.  The tissue was again oriented, mapped, dyed, and processed as above.
Unna Boot Text: An Unna boot was placed to help immobilize the limb and facilitate more rapid healing.
Secondary Intention Text (Leave Blank If You Do Not Want): The defect will heal with secondary intention.
Modified Advancement Flap Text: The defect edges were debeveled with a #15 scalpel blade.  Given the location of the defect, shape of the defect and the proximity to free margins a modified advancement flap was deemed most appropriate.  Using a sterile surgical marker, an appropriate advancement flap was drawn incorporating the defect and placing the expected incisions within the relaxed skin tension lines where possible.    The area thus outlined was incised deep to adipose tissue with a #15 scalpel blade.  The skin margins were undermined to an appropriate distance in all directions utilizing iris scissors.
Dorsal Nasal Flap Text: The defect edges were debeveled with a #15 scalpel blade.  Given the location of the defect and the proximity to free margins a dorsal nasal flap was deemed most appropriate.  Using a sterile surgical marker, an appropriate dorsal nasal flap was drawn around the defect.    The area thus outlined was incised deep to adipose tissue with a #15 scalpel blade.  The skin margins were undermined to an appropriate distance in all directions utilizing iris scissors.
Same Histology In Subsequent Stages Text: The pattern and morphology of the tumor is as described in the first stage.
Island Pedicle Flap-Requiring Vessel Identification Text: The defect edges were debeveled with a #15 scalpel blade.  Given the location of the defect, shape of the defect and the proximity to free margins an island pedicle advancement flap was deemed most appropriate.  Using a sterile surgical marker, an appropriate advancement flap was drawn, based on the axial vessel mentioned above, incorporating the defect, outlining the appropriate donor tissue and placing the expected incisions within the relaxed skin tension lines where possible.    The area thus outlined was incised deep to adipose tissue with a #15 scalpel blade.  The skin margins were undermined to an appropriate distance in all directions around the primary defect and laterally outward around the island pedicle utilizing iris scissors.  There was minimal undermining beneath the pedicle flap.
Graft Donor Site Bandage (Optional-Leave Blank If You Don't Want In Note): Telfa and a pressure bandage were applied to the donor site.
Graft Cartilage Fenestration Text: The cartilage was fenestrated with a 2mm punch biopsy to help facilitate graft survival and healing.
Bilateral Helical Rim Advancement Flap Text: The defect edges were debeveled with a #15 blade scalpel.  Given the location of the defect and the proximity to free margins (helical rim) a bilateral helical rim advancement flap was deemed most appropriate.  Using a sterile surgical marker, the appropriate advancement flaps were drawn incorporating the defect and placing the expected incisions between the helical rim and antihelix where possible.  The area thus outlined was incised through and through with a #15 scalpel blade.  With a skin hook and iris scissors, the flaps were gently and sharply undermined and freed up.
Consent Type: Consent 1 (Standard)
Dressing (No Sutures): dry sterile dressing
Trilobed Flap Text: The defect edges were debeveled with a #15 scalpel blade.  Given the location of the defect and the proximity to free margins a trilobed flap was deemed most appropriate.  Using a sterile surgical marker, an appropriate trilobed flap drawn around the defect.    The area thus outlined was incised deep to adipose tissue with a #15 scalpel blade.  The skin margins were undermined to an appropriate distance in all directions utilizing iris scissors.
Advancement Flap (Double) Text: The defect edges were debeveled with a #15 scalpel blade.  Given the location of the defect and the proximity to free margins a double advancement flap was deemed most appropriate.  Using a sterile surgical marker, the appropriate advancement flaps were drawn incorporating the defect and placing the expected incisions within the relaxed skin tension lines where possible.    The area thus outlined was incised deep to adipose tissue with a #15 scalpel blade.  The skin margins were undermined to an appropriate distance in all directions utilizing iris scissors.
Purse String (Intermediate) Text: Given the location of the defect and the characteristics of the surrounding skin a pursestring intermediate closure was deemed most appropriate.  Undermining was performed circumfirentially around the surgical defect.  A purstring suture was then placed and tightened.
Bcc Superficial Histology Text: There are aggregates of basaloid cells with hyperchromatic nuclei and scant cytoplasm which are connected to the undersuface of the epidermis.  The aggregates have peripheral palisading of their nuclei and are embedded in a fibrotic and myxoid stroma
Cheiloplasty (Less Than 50%) Text: A decision was made to reconstruct the defect with a  cheiloplasty.  The defect was undermined extensively.  Additional obicularis oris muscle was excised with a 15 blade scalpel.  The defect was converted into a full thickness wedge, of less than 50% of the vertical height of the lip, to facilite a better cosmetic result.  Small vessels were then tied off with 5-0 monocyrl. The obicularis oris, superficial fascia, adipose and dermis were then reapproximated.  After the deeper layers were approximated the epidermis was reapproximated with particular care given to realign the vermilion border.
Eye Protection Verbiage: Before proceeding with the stage, a plastic scleral shield was inserted. The globe was anesthetized with a few drops of 1% lidocaine with 1:100,000 epinephrine. Then, an appropriate sized scleral shield was chosen and coated with lacrilube ointment. The shield was gently inserted and left in place for the duration of each stage. After the stage was completed, the shield was gently removed.
Mohs Case Number: 
Mastoid Interpolation Flap Text: A decision was made to reconstruct the defect utilizing an interpolation axial flap and a staged reconstruction.  A telfa template was made of the defect.  This telfa template was then used to outline the mastoid interpolation flap.  The donor area for the pedicle flap was then injected with anesthesia.  The flap was excised through the skin and subcutaneous tissue down to the layer of the underlying musculature.  The pedicle flap was carefully excised within this deep plane to maintain its blood supply.  The edges of the donor site were undermined.   The donor site was closed in a primary fashion.  The pedicle was then rotated into position and sutured.  Once the tube was sutured into place, adequate blood supply was confirmed with blanching and refill.  The pedicle was then wrapped with xeroform gauze and dressed appropriately with a telfa and gauze bandage to ensure continued blood supply and protect the attached pedicle.
Ftsg Text: The defect edges were debeveled with a #15 scalpel blade.  Given the location of the defect, shape of the defect and the proximity to free margins a full thickness skin graft was deemed most appropriate.  Using a sterile surgical marker, the primary defect shape was transferred to the donor site. The area thus outlined was incised deep to adipose tissue with a #15 scalpel blade.  The harvested graft was then trimmed of adipose tissue until only dermis and epidermis was left.  The skin margins of the secondary defect were undermined to an appropriate distance in all directions utilizing iris scissors.  The secondary defect was closed with interrupted buried subcutaneous sutures.  The skin edges were then re-apposed with running  sutures.  The skin graft was then placed in the primary defect and oriented appropriately.
Closure 3 Information: This tab is for additional flaps and grafts above and beyond our usual structured repairs.  Please note if you enter information here it will not currently bill and you will need to add the billing information manually.
Estimated Blood Loss (Cc): minimal
Detail Level: Detailed
Consent 3/Introductory Paragraph: I gave the patient a chance to ask questions they had about the procedure.  Following this I explained the Mohs procedure and consent was obtained. The risks, benefits and alternatives to therapy were discussed in detail. Specifically, the risks of infection, scarring, bleeding, prolonged wound healing, incomplete removal, allergy to anesthesia, nerve injury and recurrence were addressed. Prior to the procedure, the treatment site was clearly identified and confirmed by the patient. All components of Universal Protocol/PAUSE Rule completed.
Consent 2/Introductory Paragraph: Mohs surgery was explained to the patient and consent was obtained. The risks, benefits and alternatives to therapy were discussed in detail. Specifically, the risks of infection, scarring, bleeding, prolonged wound healing, incomplete removal, allergy to anesthesia, nerve injury and recurrence were addressed. Prior to the procedure, the treatment site was clearly identified and confirmed by the patient. All components of Universal Protocol/PAUSE Rule completed.
Surgeon/Pathologist Verbiage (Will Incorporate Name Of Surgeon From Intro If Not Blank): operated in two distinct and integrated capacities as the surgeon and pathologist.
Hatchet Flap Text: The defect edges were debeveled with a #15 scalpel blade.  Given the location of the defect, shape of the defect and the proximity to free margins a hatchet flap was deemed most appropriate.  Using a sterile surgical marker, an appropriate hatchet flap was drawn incorporating the defect and placing the expected incisions within the relaxed skin tension lines where possible.    The area thus outlined was incised deep to adipose tissue with a #15 scalpel blade.  The skin margins were undermined to an appropriate distance in all directions utilizing iris scissors.
No Residual Tumor Seen Histology Text: There were no malignant cells seen in the sections examined.
Area H Indication Text: Tumors in this location are included in Area H (eyelids, eyebrows, nose, lips, chin, ear, pre-auricular, post-auricular, temple, genitalia, hands, feet, ankles and areola).  Tissue conservation is critical in these anatomic locations.
Mucosal Advancement Flap Text: Given the location of the defect, shape of the defect and the proximity to free margins a mucosal advancement flap was deemed most appropriate. Incisions were made with a 15 blade scalpel in the appropriate fashion along the cutaneous vermilion border and the mucosal lip. The remaining actinically damaged mucosal tissue was excised.  The mucosal advancement flap was then elevated to the gingival sulcus with care taken to preserve the neurovascular structures and advanced into the primary defect. Care was taken to ensure that precise realignment of the vermilion border was achieved.
Mauc Instructions: By selecting yes to the question below the MAUC number will be added into the note.  This will be calculated automatically based on the diagnosis chosen, the size entered, the body zone selected (H,M,L) and the specific indications you chose. You will also have the option to override the Mohs AUC if you disagree with the automatically calculated number and this option is found in the Case Summary tab.
H Plasty Text: Given the location of the defect, shape of the defect and the proximity to free margins a H-plasty was deemed most appropriate for repair.  Using a sterile surgical marker, the appropriate advancement arms of the H-plasty were drawn incorporating the defect and placing the expected incisions within the relaxed skin tension lines where possible. The area thus outlined was incised deep to adipose tissue with a #15 scalpel blade. The skin margins were undermined to an appropriate distance in all directions utilizing iris scissors.  The opposing advancement arms were then advanced into place in opposite direction and anchored with interrupted buried subcutaneous sutures.
Donor Site Anesthesia Type: same as repair anesthesia
Muscle Hinge Flap Text: The defect edges were debeveled with a #15 scalpel blade.  Given the size, depth and location of the defect and the proximity to free margins a muscle hinge flap was deemed most appropriate.  Using a sterile surgical marker, an appropriate hinge flap was drawn incorporating the defect. The area thus outlined was incised with a #15 scalpel blade.  The skin margins were undermined to an appropriate distance in all directions utilizing iris scissors.
Surgeon Performing Repair (Optional): Jess Burnham M.D.
Tarsorrhaphy Text: A tarsorrhaphy was performed using Frost sutures.
Tissue Cultured Epidermal Autograft Text: The defect edges were debeveled with a #15 scalpel blade.  Given the location of the defect, shape of the defect and the proximity to free margins a tissue cultured epidermal autograft was deemed most appropriate.  The graft was then trimmed to fit the size of the defect.  The graft was then placed in the primary defect and oriented appropriately.
O-L Flap Text: The defect edges were debeveled with a #15 scalpel blade.  Given the location of the defect, shape of the defect and the proximity to free margins an O-L flap was deemed most appropriate.  Using a sterile surgical marker, an appropriate advancement flap was drawn incorporating the defect and placing the expected incisions within the relaxed skin tension lines where possible.    The area thus outlined was incised deep to adipose tissue with a #15 scalpel blade.  The skin margins were undermined to an appropriate distance in all directions utilizing iris scissors.
Cheiloplasty (Complex) Text: A decision was made to reconstruct the defect with a  cheiloplasty.  The defect was undermined extensively.  Additional obicularis oris muscle was excised with a 15 blade scalpel.  The defect was converted into a full thickness wedge to facilite a better cosmetic result.  Small vessels were then tied off with 5-0 monocyrl. The obicularis oris, superficial fascia, adipose and dermis were then reapproximated.  After the deeper layers were approximated the epidermis was reapproximated with particular care given to realign the vermilion border.
Manual Repair Warning Statement: We plan on removing the manually selected variable below in favor of our much easier automatic structured text blocks found in the previous tab. We decided to do this to help make the flow better and give you the full power of structured data. Manual selection is never going to be ideal in our platform and I would encourage you to avoid using manual selection from this point on, especially since I will be sunsetting this feature. It is important that you do one of two things with the customized text below. First, you can save all of the text in a word file so you can have it for future reference. Second, transfer the text to the appropriate area in the Library tab. Lastly, if there is a flap or graft type which we do not have you need to let us know right away so I can add it in before the variable is hidden. No need to panic, we plan to give you roughly 6 months to make the change.
Cheek Interpolation Flap Text: A decision was made to reconstruct the defect utilizing an interpolation axial flap and a staged reconstruction.  A telfa template was made of the defect.  This telfa template was then used to outline the Cheek Interpolation flap.  The donor area for the pedicle flap was then injected with anesthesia.  The flap was excised through the skin and subcutaneous tissue down to the layer of the underlying musculature.  The interpolation flap was carefully excised within this deep plane to maintain its blood supply.  The edges of the donor site were undermined.   The donor site was closed in a primary fashion.  The pedicle was then rotated into position and sutured.  Once the tube was sutured into place, adequate blood supply was confirmed with blanching and refill.  The pedicle was then wrapped with xeroform gauze and dressed appropriately with a telfa and gauze bandage to ensure continued blood supply and protect the attached pedicle.
Consent (Scalp)/Introductory Paragraph: The rationale for Mohs was explained to the patient and consent was obtained. The risks, benefits and alternatives to therapy were discussed in detail. Specifically, the risks of changes in hair growth pattern secondary to repair, infection, scarring, bleeding, prolonged wound healing, incomplete removal, allergy to anesthesia, nerve injury and recurrence were addressed. Prior to the procedure, the treatment site was clearly identified and confirmed by the patient. All components of Universal Protocol/PAUSE Rule completed.
Consent (Spinal Accessory)/Introductory Paragraph: The rationale for Mohs was explained to the patient and consent was obtained. The risks, benefits and alternatives to therapy were discussed in detail. Specifically, the risks of damage to the spinal accessory nerve, infection, scarring, bleeding, prolonged wound healing, incomplete removal, allergy to anesthesia, and recurrence were addressed. Prior to the procedure, the treatment site was clearly identified and confirmed by the patient. All components of Universal Protocol/PAUSE Rule completed.

## 2018-11-08 ENCOUNTER — APPOINTMENT (RX ONLY)
Dept: URBAN - METROPOLITAN AREA CLINIC 158 | Facility: CLINIC | Age: 78
Setting detail: DERMATOLOGY
End: 2018-11-08

## 2018-11-08 DIAGNOSIS — Z48.02 ENCOUNTER FOR REMOVAL OF SUTURES: ICD-10-CM

## 2018-11-08 PROCEDURE — ? SUTURE REMOVAL (GLOBAL PERIOD)

## 2018-11-08 ASSESSMENT — LOCATION ZONE DERM: LOCATION ZONE: FACE

## 2018-11-08 ASSESSMENT — LOCATION DETAILED DESCRIPTION DERM: LOCATION DETAILED: RIGHT FOREHEAD

## 2018-11-08 ASSESSMENT — LOCATION SIMPLE DESCRIPTION DERM: LOCATION SIMPLE: RIGHT FOREHEAD

## 2018-11-08 NOTE — PROCEDURE: SUTURE REMOVAL (GLOBAL PERIOD)
Detail Level: Detailed
Add 60111 Cpt? (Important Note: In 2017 The Use Of 93890 Is Being Tracked By Cms To Determine Future Global Period Reimbursement For Global Periods): no

## 2019-02-13 ENCOUNTER — HOSPITAL ENCOUNTER (OUTPATIENT)
Dept: HOSPITAL 88 - OR | Age: 79
Discharge: HOME | End: 2019-02-13
Attending: INTERNAL MEDICINE
Payer: MEDICARE

## 2019-02-13 VITALS — DIASTOLIC BLOOD PRESSURE: 58 MMHG | SYSTOLIC BLOOD PRESSURE: 110 MMHG
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
## 2019-02-13 DIAGNOSIS — E78.00: ICD-10-CM

## 2019-02-13 DIAGNOSIS — I10: ICD-10-CM

## 2019-02-13 DIAGNOSIS — Z01.810: ICD-10-CM

## 2019-02-13 DIAGNOSIS — K44.9: ICD-10-CM

## 2019-02-13 DIAGNOSIS — J44.9: ICD-10-CM

## 2019-02-13 DIAGNOSIS — K20.9: ICD-10-CM

## 2019-02-13 DIAGNOSIS — K29.70: Primary | ICD-10-CM

## 2019-02-13 DIAGNOSIS — T81.89XA: ICD-10-CM

## 2019-02-13 DIAGNOSIS — K29.80: ICD-10-CM

## 2019-02-13 DIAGNOSIS — Z01.812: ICD-10-CM

## 2019-02-13 DIAGNOSIS — M19.90: ICD-10-CM

## 2019-02-13 LAB
BASOPHILS # BLD AUTO: 0.1 10*3/UL (ref 0–0.1)
BASOPHILS NFR BLD AUTO: 0.7 % (ref 0–1)
DEPRECATED NEUTROPHILS # BLD AUTO: 5.2 10*3/UL (ref 2.1–6.9)
EOSINOPHIL # BLD AUTO: 0.6 10*3/UL (ref 0–0.4)
EOSINOPHIL NFR BLD AUTO: 7.6 % (ref 0–6)
ERYTHROCYTE [DISTWIDTH] IN CORD BLOOD: 15.5 % (ref 11.7–14.4)
HCT VFR BLD AUTO: 35.7 % (ref 34.2–44.1)
HGB BLD-MCNC: 11.1 G/DL (ref 12–16)
LYMPHOCYTES # BLD: 1.6 10*3/UL (ref 1–3.2)
LYMPHOCYTES NFR BLD AUTO: 19.2 % (ref 18–39.1)
MCH RBC QN AUTO: 27.3 PG (ref 28–32)
MCHC RBC AUTO-ENTMCNC: 31.1 G/DL (ref 31–35)
MCV RBC AUTO: 87.7 FL (ref 81–99)
MONOCYTES # BLD AUTO: 0.8 10*3/UL (ref 0.2–0.8)
MONOCYTES NFR BLD AUTO: 9.4 % (ref 4.4–11.3)
NEUTS SEG NFR BLD AUTO: 62.7 % (ref 38.7–80)
PLATELET # BLD AUTO: 341 X10E3/UL (ref 140–360)
RBC # BLD AUTO: 4.07 X10E6/UL (ref 3.6–5.1)

## 2019-02-13 PROCEDURE — 88305 TISSUE EXAM BY PATHOLOGIST: CPT

## 2019-02-13 PROCEDURE — 85025 COMPLETE CBC W/AUTO DIFF WBC: CPT

## 2019-02-13 PROCEDURE — 93005 ELECTROCARDIOGRAM TRACING: CPT

## 2019-02-13 PROCEDURE — 43239 EGD BIOPSY SINGLE/MULTIPLE: CPT

## 2019-02-13 PROCEDURE — 88312 SPECIAL STAINS GROUP 1: CPT

## 2019-02-13 PROCEDURE — 36415 COLL VENOUS BLD VENIPUNCTURE: CPT

## 2019-02-13 NOTE — XMS REPORT
Summary of Care: 17 - 17

                             Created on: 2077



SOLO SANCHEZ

External Reference #: [id number]

: 1940

Sex: Female



Demographics







                          Address                   [address]  [address]

 

                          Home Phone                [phone]

 

                          Preferred Language        English

 

                          Marital Status            Single

 

                          Congregation Affiliation     Scientologist

 

                          Race                      White/

 

                          Ethnic Group              Non-





Author







                          Author                    [facility]

 

                          Organization              [facility]

 

                          Address                   Unknown

 

                          Phone                     Unavailable







Encounter





HQ Encntr_alias(FIN) [id number] Date(s): 17 - 17

[facility] [address] [address]      [phone]
[phone]

Discharge Disposition: Home or Self Care

Attending Physician: Simon Oneill DO





Vital Signs





No data available for this section



Problem List





No data available for this section



Allergies, Adverse Reactions, Alerts





No data available for this section



Medications





No data available for this section



Results





No data available for this section



Immunizations





No data available for this section



Procedures





No data available for this section



Social History





No data available for this section



Assessment and Plan





No data available for this section

## 2019-02-13 NOTE — XMS REPORT
Braden Blas MD

                             Created on: 2014



Bailey Teague

External Reference #: [id number]

: 1940

Sex: Female



Demographics







                          Address                   [address] [place] [address] [address]

[place]  [address]

 

                          Home Phone                [phone]

 

                          Preferred Language        Unknown

 

                          Marital Status            Unknown

 

                          Moravian Affiliation     Unknown

 

                          Race                      Unknown

 

                          Ethnic Group              Non-





Author







                          Braden Navarro

 

                          [facility]              [employer]

 

                          Address                   Unknown

 

                          Phone                     Unavailable







Care Team Providers







                    Care Team Member Name    Role                Phone

 

                    Braden Blas                       Unavailable



                                            



Encounters

          





                    Encounter           Location            Date

 

                    New order form For MRI    Braden Blas MD    2014

 

                    Gabapentin          Braden Blas MD    2014

 

                    reschedule f/u      Braden Blas MD    2014



                                                                                
                           



Problems

          





             Problem Type    Condition    ICD-9 Code    Onset Dates    Condition Status

 

             Problem      Inflammatory Arthritis    714.0                     Active

 

             Problem      Polyarthritis, multiple sites    716.59                    Active

 

             Problem      Allergy, unspecified not elsewhere classified    995.3                     Active



                                                                                
                           



Social History

          





                    Social History Element    Qualifiers          Date Reported

 

                    Pets:               .  dogs             2014

 

                    Caffeine:           yes.  frequency:, 1-5    2014

 

                    Exercise:           yes.  walking 3 times a week    2014

 

                    Alcohol:            no.                 2014

 

                    Occupation:         .  retired          2014



                                                                                
                                                         



Vital Signs

          





                          Date/Time:                2014

 

                          Weight                    182 lbs

 

                          Height                    61 inches

 

                          Temperature               98.4 F

 

                          Cardiac Monitoring Heart Rate    80 Beats per Minute

 

                          Blood Pressure Diastolic    80 mm Hg

 

                          Blood Pressure Systolic    130 mm Hg



                                                                    



Summary Purpose

          [employer] Submission

## 2019-02-13 NOTE — XMS REPORT
Braden Blas MD

                             Created on: 07/10/2014



Ho Bailey

External Reference #: [id number]

: 1940

Sex: Female



Demographics







                          Address                   [address] [place] [address] [address]

[place]  [address]

 

                          Home Phone                [phone]

 

                          Preferred Language        Unknown

 

                          Marital Status            Unknown

 

                          Taoism Affiliation     Unknown

 

                          Race                      Unknown

 

                          Ethnic Group              Non-





Author







                          Braden Navarro

 

                          [facility]              [employer]

 

                          Address                   Unknown

 

                          Phone                     Unavailable







Care Team Providers







                    Care Team Member Name    Role                Phone

 

                    Braden Blas CP                  Unavailable



                                            



Encounters

          





                    Encounter           Location            Date

 

                    Referral            Braden Blas MD    May 19, 2014

 

                    pt call             Braden Blas MD    2014

 

                    LEG PAINS           Braden Blas MD    2014

 

                    Unknown             Braden Blas MD    May 14, 2014

 

                    New order form For MRI    Braden Blas MD    2014

 

                    refill hydrocodone    Braden Blas MD    2014

 

                    Gabapentin          Braden Blas MD    2014

 

                    reschedule f/u      Braden Blas MD    2014

 

                    Gabapentin          Braden Blas MD    2014

 

                    MRI                 Braden Blas MD    2014

 

                    Pt back pain update    Braden Blas MD    2014



                                                                                
                                                                                
                          



Problems

          





             Problem Type    Condition    ICD-9 Code    Onset Dates    Condition Status

 

             Problem      Allergy, unspecified not elsewhere classified    995.3                     Active

 

             Problem      Inflammatory Arthritis    714.0                     Active

 

             Problem      Lumbar Radiculopathy    724.4                     Active

 

             Problem      Polyarthritis, multiple sites    716.59                    Active



                                                                                
                                     



Social History

          





                    Social History Element    Qualifiers          Date Reported

 

                          Tobacco Use:              .   Are you a:: former smoker , How long has it been since you last

 smoked?: 5-10 years                    May 14, 2014

 

                    Pets:               .  dogs             May 14, 2014

 

                    Caffeine:           yes.  frequency:, 1-5    May 14, 2014

 

                    Exercise:           yes.  walking 3 times a week    May 14, 2014

 

                    Alcohol:            no.                 May 14, 2014

 

                    Occupation:         .  retired          May 14, 2014



                                                                                
                                     



Summary Purpose

          [employer] Submission

## 2019-02-13 NOTE — XMS REPORT
Braden Blas MD

                             Created on: 2014



Bailey Teague

External Reference #: [id number]

: 1940

Sex: Female



Demographics







                          Address                   [address] [place] [address] [address]

[place]  [address]

 

                          Home Phone                [phone]

 

                          Preferred Language        Unknown

 

                          Marital Status            Unknown

 

                          Druze Affiliation     Unknown

 

                          Race                      Unknown

 

                          Ethnic Group              Non-





Author







                          Author                    Braden Blas

 

                          [facility]              eClinicalWorks

 

                          Address                   Unknown

 

                          Phone                     Unavailable







Care Team Providers







                    Care Team Member Name    Role                Phone

 

                    Braden Blas     CP                  Unavailable



                                            



Encounters

          





                    Encounter           Location            Date

 

                    New order form For MRI    Braden Blas MD    2014



                                                                                
       



Problems

          





             Problem Type    Condition    ICD-9 Code    Onset Dates    Condition Status

 

             Problem      Inflammatory Arthritis    714.0                     Active

 

             Problem      Polyarthritis, multiple sites    716.59                    Active

 

             Problem      Allergy, unspecified not elsewhere classified    995.3                     Active

 

             Assessment    Lumbar Radiculopathy    724.4                     Active



                                                                                
                                     



Social History

          





                    Social History Element    Qualifiers          Date Reported

 

                    Pets:               .  dogs             2014

 

                    Caffeine:           yes.  frequency:, 1-5    2014

 

                    Exercise:           yes.  walking 3 times a week    2014

 

                    Alcohol:            no.                 2014

 

                    Occupation:         .  retired          2014



                                                                                
                                                         



Vital Signs

          





                          Date/Time:                2014

 

                          Weight                    182 lbs

 

                          Height                    61 inches

 

                          Temperature               98.4 F

 

                          Cardiac Monitoring Heart Rate    80 Beats per Minute

 

                          Blood Pressure Diastolic    80 mm Hg

 

                          Blood Pressure Systolic    130 mm Hg



                                                                    



Summary Purpose

          eClinicalWorks Submission

## 2019-02-13 NOTE — OPERATIVE REPORT
DATE OF PROCEDURE:  February 13, 2019 



REFERRING PHYSICIAN:  Dr. Pito Marshall



PROCEDURE PERFORMED:  Esophagogastroduodenoscopy with biopsies.  



INDICATIONS FOR PROCEDURE:  Upper abdominal pain and bloating.



MEDICATION:  Patient was done under MAC.  Please see anesthesiologist's 

note.



PROCEDURE:  With the patient in the left lateral decubitus position, the 

flexible fiberoptic Olympus gastroscope was introduced into the esophagus 

under direct visualization without any difficulty.  There was some patchy 

erythema noted in the distal esophagus.  The scope was then advanced with 

ease into the stomach traversing a small sliding hiatal hernia.  Mucosa 

overlying the antrum and the body revealed the body revealed some patchy 

erythema and mild to moderate edema, and biopsies were obtained and sent to 

stain for H. pylori.  There was a suture granuloma noted in the antrum 

along the anterior wall.  The pylorus was of normal contour and shape.  It 

was intubated with ease.  The scope was advanced all the way to the 2nd 

portion of the duodenum.  Mucosa overlying the 2nd portion appeared to be 

within normal limits.  The distal bulb revealed some patchy intense 

erythema and friability.  The scope was then withdrawn back into the 

stomach and retroflexed.  The mucosa overlying the fundus and the cardia 

appeared to be within normal limits.  The scope was then straightened out.  

It was subsequently withdrawn.  Patient tolerated the procedure well.



IMPRESSION

1.  Mild distal esophagitis.

2.  Small sliding hiatal hernia.

3.  Gastritis, biopsied.  Biopsy sent to stain for Helicobacter pylori.

4.  Suture granuloma, distal antrum anterior wall.

5.  Duodenitis.





PLAN:  Follow up histology.  Initiate Protonix 40 mg 1 p.o. q.a.m. a.c.  











DD:  02/13/2019 14:06

DT:  02/13/2019 15:36

Job#:  [id number]



cc:PITO MARSHALL DO

## 2019-02-13 NOTE — XMS REPORT
Summary of Care: 17 - 17

                             Created on: 2029



SOLO SANCHEZ

External Reference #: [id number]

: 1940

Sex: Female



Demographics







                          Address                   [address]  [address]

 

                          Home Phone                [phone]

 

                          Preferred Language        English

 

                          Marital Status            Single

 

                          Temple Affiliation     Mosque

 

                          Race                      White/

 

                          Ethnic Group              Non-





Author







                          Author                    [facility]

 

                          Organization              [facility]

 

                          Address                   Unknown

 

                          Phone                     Unavailable







Encounter





HQ Encntr_alias(FIN) [id number] Date(s): 17 - 17

[facility] [address] [address]      [phone]
[phone]

Discharge Disposition: Home or Self Care

Attending Physician: Simon Oneill DO





Vital Signs





No data available for this section



Problem List





No data available for this section



Allergies, Adverse Reactions, Alerts





No data available for this section



Medications





No data available for this section



Results





No data available for this section



Immunizations





No data available for this section



Procedures





No data available for this section



Social History





No data available for this section



Assessment and Plan





No data available for this section

## 2019-02-13 NOTE — XMS REPORT
Braden Blas MD

                             Created on: 07/10/2014



Ho Bailey

External Reference #: [id number]

: 1940

Sex: Female



Demographics







                          Address                   [address] [place] [address] [address]

[place]  [address]

 

                          Home Phone                [phone]

 

                          Preferred Language        Unknown

 

                          Marital Status            Unknown

 

                          Presybeterian Affiliation     Unknown

 

                          Race                      Unknown

 

                          Ethnic Group              Non-





Author







                          Braden Navarro

 

                          [facility]              eClinicalWorks

 

                          Address                   Unknown

 

                          Phone                     Unavailable







Care Team Providers







                    Care Team Member Name    Role                Phone

 

                    Braden Blas CP                  Unavailable



                                            



Encounters

          





                    Encounter           Location            Date

 

                    Referral            Braden Blas MD    May 19, 2014

 

                    pt call             Braden Blas MD    2014

 

                    LEG PAINS           Braden Blas MD    2014

 

                    Unknown             Braden Blas MD    May 14, 2014

 

                    New order form For MRI    Braden Blas MD    2014

 

                    refill hydrocodone    Braden Blas MD    2014

 

                    Gabapentin          Braden Blas MD    2014

 

                    reschedule f/u      Braden Blas MD    2014

 

                    Gabapentin          Braden Blas MD    2014

 

                    MRI                 Braden Blas MD    2014

 

                    Pt back pain update    Braden Blas MD    2014



                                                                                
                                                                                
                          



Problems

          





             Problem Type    Condition    ICD-9 Code    Onset Dates    Condition Status

 

             Problem      Allergy, unspecified not elsewhere classified    995.3                     Active

 

             Problem      Inflammatory Arthritis    714.0                     Active

 

             Problem      Lumbar Radiculopathy    724.4                     Active

 

             Problem      Polyarthritis, multiple sites    716.59                    Active

 

             Assessment    Inflammatory Arthritis    714.0                     Active



                                                                                
                                               



Medications

          





        Medication    Code System    Code    Instructions    Start Date    End Date    Status    Dosage



 

           Gabapentin    [facility]    [address]-67    300 MG Orally at bedtime    2014     

                          Active                    1 -2 capsule



                                                                                
       



Social History

          





                    Social History Element    Qualifiers          Date Reported

 

                          Tobacco Use:              .   Are you a:: former smoker , How long has it been since you last

 smoked?: 5-10 years                    May 14, 2014

 

                    Pets:               .  dogs             May 14, 2014

 

                    Caffeine:           yes.  frequency:, 1-5    May 14, 2014

 

                    Exercise:           yes.  walking 3 times a week    May 14, 2014

 

                    Alcohol:            no.                 May 14, 2014

 

                    Occupation:         .  retired          May 14, 2014



                                                                                
                                     



Summary Purpose

          eClinicalWorks Submission

## 2019-02-13 NOTE — XMS REPORT
Summary of Care: 1/6/15 - 1/6/15

                             Created on: 2072



SOLO SANCHEZ

External Reference #: [id number]

: 1940

Sex: Female



Demographics







                          Address                   

[place]  [address]

 

                          Home Phone                [phone]

 

                          Preferred Language        English

 

                          Marital Status            Single

 

                          Pentecostal Affiliation     Gnosticism

 

                          Race                      White/

 

                          Ethnic Group              Non-





Author







                          Organization              Unknown

 

                          Address                   Unknown

 

                          Phone                     Unavailable







Encounter





HQ Encntr_kalyani(VIVIAN) [id number] Date(s): 1/6/15 - 1/6/15

[facility] [address] [address]

Discharge Disposition: Home

Physician Attending: Job Whittington MD

Physician_Referring: Job Whittington MD





Reason for Visit





SOB



Vital Signs







 



                           Most recent to            1



                                         oldest [Reference 



                                         Range]: 

 

 



                           Height                    152.4 cm



                                         (1/6/15 2:30 PM)

 

 



                           Weight                    81.818 kg



                                         (1/6/15 2:30 PM)

 

 



                           Body Mass Index           35.23 m2



                                         (1/6/15 2:30 PM)







Problem List





No data available for this section



Allergies, Adverse Reactions, Alerts





No data available for this section



Medications





No data available for this section



Medications Administered During Your Visit





No data available for this section



Immunizations





No data available for this section

## 2019-02-13 NOTE — XMS REPORT
Braden Blas MD

                             Created on: 2015



LAURA SOLO

External Reference #: [id number]

: 1940

Sex: Female



Demographics







                          Address                   [address] [place] [address] 

[place], [place]  [id number]

 

                          Home Phone                [phone]

 

                          Preferred Language        Unknown

 

                          Marital Status            Unknown

 

                          Sabianist Affiliation     Unknown

 

                          Race                      Unknown

 

                          Ethnic Group              Non-





Author







                          Author                    Job Whittington

 

                          [facility]              eClinicalWorks

 

                          Address                   Unknown

 

                          Phone                     Unavailable







Care Team Providers







                    Care Team Member Name    Role                Phone

 

                    Job Whittington                      Unavailable



                                                                



Allergies, Adverse Reactions, Alerts

          





                    Substance           Reaction            Event Type

 

                    N.K.D.A.            Info Not Available    Non Drug Allergy



                                                                    



Encounters

          





                    Encounter           Location            Date

 

                    Referral            Braden Blas MD    May 19, 2014

 

                    pt call             Braden Blas MD    2014

 

                    LEG PAINS           Braden Blas MD    2014

 

                    Unknown             Braden Blas MD    May 14, 2014

 

                    New order form For MRI    Braden Blas MD    2014

 

                    Gabapentin          Braden Blas MD    2014

 

                    reschedule f/u      Braden Blas MD    2014

 

                    Gabapentin          Braden Blas MD    2014

 

                    MRI                 Braden Blas MD    2014

 

                    Pt back pain update    Braden Blas MD    2014

 

                    F/U                 Braden Blas MD    Dec 29, 2014

 

                    refill hydrocodone    Braden Blas MD    2014

 

                    No longer wants to be seen here    Braden Blas MD    July 15, 2014



                                                                                
                                                                                
                                              



Problems

          





             Problem Type    Condition    ICD-9 Code    Onset Dates    Condition Status

 

             Assessment    Inflammatory Arthritis    714.0                     Active

 

             Assessment    Unspecified drug dependence    304.90                    Active

 

             Problem      Abnormal SUMIT    795.79                    Active

 

             Problem      Lumbar Radiculopathy    724.4                     Active

 

             Problem      Unspecified drug dependence    304.90                    Active

 

             Problem      Polyarthritis, multiple sites    716.59                    Active

 

             Assessment    Abnormal SUMIT    795.79                    Active

 

             Problem      Allergy, unspecified not elsewhere classified    995.3                     Active

 

             Problem      Inflammatory Arthritis    714.0                     Active



                                                                                
                                                                                
      



Medications

          





        Medication    Code System    Code    Instructions    Start Date    End Date    Status    Dosage



 

        Naproxen    [facility]    [address]-32    500 MG Orally every 12 hrs                    Active    1 tablet

 as needed

 

        Zetia    [facility]    [address]-52    10 MG Orally Once a day                    Active    1 tablet

 

        Duloxetine HCl    [facility]    [address]-08    30 MG Orally qd                    Active    1 capsule



 

        Aspirin    [facility]    [address]-23    81 MG Orally Once a day                    Active    1 tablet



 

        Tramadol    Unknown    0       50 mg orally every 4-6 hours prn pain                    Active    one tab



 

             Losartan Potassium-HCTZ    [facility]     [address]-57    50-12.5 MG Orally Once a day    

                                        Active              1 tablet

 

          Meloxicam    [facility]    [address]-53    7.5 MG Orally bid    Dec 16, 2014              Inactive

                                        1 tablet

 

                Hydrocodone-Acetaminophen    [facility]        [address]-00    2.5-500 MG Orally every 6 hrs

                2014                    Inactive        1 tablet as needed

 

          Pravastatin Sodium    [facility]    [address]-79    80 MG Orally Once a day                        Active

                                        1 tablet

 

        Gabapentin    [facility]    [id number]    300 mg  qd pm                    Active    2 caps



                                                                                
                                                                                
                



Social History

          





                    Social History Element    Qualifiers          Date Reported

 

                          Tobacco Use:              .   Are you a:: former smoker , How long has it been since you last

 smoked?: 5-10 years                    Dec 29, 2014

 

                    Pets:               .  dogs             Dec 29, 2014

 

                    Caffeine:           yes.  frequency:, 1-5    Dec 29, 2014

 

                    Exercise:           yes.  walking 3 times a week    Dec 29, 2014

 

                    Alcohol:            no.                 Dec 29, 2014

 

                    Occupation:         .  retired          Dec 29, 2014



                                                                                
                                                                   



Vital Signs

          





                          Date/Time:                Dec 29, 2014

 

                          Weight                    179 lbs

 

                          Height                    61 in

 

                          Temperature               98.2 F

 

                          Cardiac Monitoring Heart Rate    76 /min

 

                          Blood Pressure Diastolic    84 mm Hg

 

                          Blood Pressure Systolic    124 mm Hg



                                                                    



Results

          





                                         

 

                                        COMPREHENSIVE METABOLIC PANEL W/EGFR

 

                          CALCIUM(-8.6-10.4 mg/dL)    9.6

 

                          CARBON DIOXIDE(-19-30 mmol/L)    28

 

                          ALT(-6-29 U/L)            16

 

                          CREATININE(-0.60-0.93 mg/dL)    0.79

 

                          AST(-10-35 U/L)           20

 

                          eGFR NON-AFR. AMERICAN(-> OR=60 mL/min/1.73m2)    74

 

                          ALKALINE PHOSPHATASE(- U/L)    62

 

                          eGFR AFRICAN AMERICAN(-> OR=60 mL/min/1.73m2)    86

 

                          BILIRUBIN, TOTAL(-0.2-1.2 mg/dL)    0.2

 

                          BUN/CREATININE RATIO(-6-22 (calc))    NOT APPLICABLE

 

                          ALBUMIN/GLOBULIN RATIO(-1.0-2.5 (calc))    1.6

 

                          SODIUM(-135-146 mmol/L)    137

 

                          GLOBULIN(-1.9-3.7 g/dL (calc))    2.8

 

                          POTASSIUM(-3.5-5.3 mmol/L)    4.3

 

                          GLUCOSE(-65-99 mg/dL)     96

 

                          CHLORIDE(- mmol/L)    100

 

                          ALBUMIN(-3.6-5.1 g/dL)    4.4

 

                          UREA NITROGEN (BUN)(-7-25 mg/dL)    20

 

                          PROTEIN, TOTAL(-6.1-8.1 g/dL)    7.2

 

                                        SED RATE BY MODIFIED WESTERGREN

 

                          SED RATE BY MODIFIED WESTERGREN(-< OR=30 mm/h)    11

 

                                        C-REACTIVE PROTEIN

 

                          C-REACTIVE PROTEIN(-<0.80 mg/dL)    0.33

 

                                        CBC (INCLUDES DIFF/PLT)

 

                          MCHC(-32.0-36.0 g/dL)     33.4

 

                          MCH(-27.0-33.0 pg)        28.2

 

                          PLATELET COUNT(-140-400 Thousand/uL)    304

 

                          RDW(-11.0-15.0 %)         13.9

 

                          ABSOLUTE LYMPHOCYTES(-850-3900 cells/uL)    1685

 

                          ABSOLUTE MONOCYTES(-200-950 cells/uL)    753

 

                          ABSOLUTE NEUTROPHILS(-8133-4053 cells/uL)    5208

 

                          NEUTROPHILS(- %)          64.3

 

                          HEMATOCRIT(-35.0-45.0 %)    38.7

 

                          LYMPHOCYTES(- %)          20.8

 

                          MCV(-80.0-100.0 fL)       84.4

 

                          RED BLOOD CELL COUNT(-3.80-5.10 Million/uL)    4.58

 

                          ABSOLUTE EOSINOPHILS(- cells/uL)    413

 

                          ABSOLUTE BASOPHILS(-0-200 cells/uL)    41

 

                          HEMOGLOBIN(-11.7-15.5 g/dL)    12.9

 

                          BASOPHILS(- %)            0.5

 

                          WHITE BLOOD CELL COUNT(-3.8-10.8 Thousand/uL)    8.1

 

                          MONOCYTES(- %)            9.3

 

                          EOSINOPHILS(- %)          5.1



                                                                                
                 



Summary Purpose

          eClinicalWorks Submission

## 2019-02-13 NOTE — XMS REPORT
Continuity of Care Document

                             Created on: 2017



SOLO SANCHEZ

External Reference #: [id number]

: 1940

Sex: Female



Demographics







                          Address                   [address] [place] [address]  [address]

 

                          Home Phone                [phone]

 

                          Preferred Language        Unknown

 

                          Marital Status            Unknown

 

                          Quaker Affiliation     Unknown

 

                          Race                      Unknown

 

                          Ethnic Group              Unknown





Author







                          Author                    [facility]              Interface

 

                          Address                   Unknown

 

                          Phone                     Unavailable



                                                    



Problems

                    





                    Problem                            Status                            Onset Date     

                          Classification                            Date Reported       

                          Comments                            Source                    

 

                          Z12.31 - ENCNTR SCREEN MAMMOGRAM FOR MA                            Active         

                    2017                                                          

                                                       OP[facility]               

     

 

                    SOB                            Active                            2014         

                                                                                        

                                         [place]                    

 

                    Inflammatory Arthritis                            Active                            

                            Diagnosis                            2015          

                                                      León Blas                    

 

                          Polyarthritis, multiple sites                            Active                   

                                                Problem                            2015     

                                                      León Blas                    



 

                          Allergy, unspecified not elsewhere classified                            Active   

                                                      Problem                         

                    2015                                                        León Blas    

                

 

                    Lumbar Radiculopathy                            Active                              

                          Problem                            2015              

                                                      León Blas                    

 

                          Unspecified drug dependence                            Active                     

                                                Diagnosis                            2015     

                                                      León Blas                    



 

                    Abnormal SUMIT                            Active                                      

                          Problem                            2015                      

                                                      León Blas                    



                                                                                
                                                                                
                                      



Medications

                    





                    Medication                            Details                            Route      

                          Status                            Patient Instructions         

                          Ordering Provider                            Order Date           

                                        Source                    

 

                    Meloxicam                            1 tablet                            Orally     

                          No Longer Active                            7.5 MG Orally bid 

                           Nelsy                            2014              

                                        León Blas                    

 

                          Hydrocodone-Acetaminophen                            1 tablet as needed           

                          Orally                            No Longer Active                  

                          2.5-500 MG Orally every 6 hrs                            Nelsy            

                          2014                            León Blas                 

   

 

                    Gabapentin                            1 -2 capsule                            Orally

                            Active                            300 MG Orally at bedtime

                            Blas                            2014             

                                        León Blas                    

 

                          Hydrocodone-Acetaminophen                            1 tablet as needed           

                    Orally                            Active                            5-

325 MG Orally every 12 hours                            Blas                     

                          2014                            León Blas                    

 

                          Hydrocodone-Acetaminophen                            1 tablet as needed           

                    Orally                            Active                            5-

325 MG Orally every 12 hours                            Guillen                       

                          2014                            León Blas                    

 

                    Alendronate Sodium                            1 tablet                            Orally

                            Active                            70 MG Orally           

                    Guillen                                                        León Blas

                    

 

                    Pravastatin Sodium                            1 tablet                            Orally

                            Active                            80 MG Orally Once a day

                            Nelsy                                                   

                                        León Blas                    

 

                    Zetia                            1 tablet                            Orally         

                          Active                            10 MG Orally Once a day         

                    Nelsy                                                        León Blas                    

 

                    Naproxen                            1 tablet as needed                            Orally

                            Active                            500 MG Orally every 12 

hrs                            Nelsy                                                

                                        León Blas                    

 

                          Losartan Potassium-HCTZ                            1 tablet                       

                    Orally                            Active                            50-12.5 MG Orally

 Once a day                            Nelsy                                        

                                        León Blas                    

 

                    Tramadol                            one tab                            orally       

                          Active                            50 mg orally every 4-6 hours prn

 pain                            Nelsy                                              

                                        León Blas                    

 

                    Duloxetine HCl                            1 capsule                            Orally

                            Active                            30 MG Orally qd        

                    Nelsy                                                        León Blas                    

 

                    Aspirin                            1 tablet                            Orally       

                          Active                            81 MG Orally Once a day       

                     Nelsy                                                        León Blas                    

 

                    Gabapentin                            2 caps                            NA          

                          Active                            300 mg qd pm                     

                    Nelsy                                                        León Blas    

                

 

                    Ranitidine 75                            as directed                            Orally

                            Active                            75 MG Orally three times

 a day                            Mindy Blas                    

 

                    Cetirizine HCl                            1 tablet                            Orally

                            Active                            10 MG Orally Twice a day

                            Mindy Blas                    



                                                                                
                                                                                
                                                                                
                                                                                
    



Allergies, Adverse Reactions, Alerts

                    





                    Substance                            Category                            Reaction   

                          Severity                            Reaction type           

                          Status                            Date Reported                     

                          Comments                            Source                    

 

                    N.K.D.A.                            Adverse Reaction                            Info

 Not Available                                                        Adverse Reaction

                            Active                            2014             

                                                      León Blas                    



                                                                        



Immunizations

                    





                    Immunization                            Date Given                            Site  

                          Status                            Last Updated             

                          Comments                            Source                    



                                                                        



Results

                    





                    Order Name                            Results                            Value      

                          Reference Range                            Date                

                          Interpretation                            Comments                       

                                        Source                    

 

                          Breast Mammo Diag UNI incl CAD MA                            Breast Mammo Diag UNI

 incl CAD [place]                             - BREAST MAMMO DIAG UNI INCL CAD MA/R

UNILATERAL RIGHT DIGITAL DIAGNOSTIC MAMMOGRAM WITH CAD: 2017

CLINICAL: Other Abnormal And Inconclusive Findings On Diagnostic Imaging Of 
Breast/R92.8.  



Current study was evaluated with a Computer Aided Detection (CAD) system.  

Comparison is made to exams dated:  2017 mammogram, 12/10/2014 mammogram, 
2013 mammogram, 2012 mammogram - [facility], 2011 
mammogram and 2009 mammogram - [facility].  

There are scattered fibroglandular densities in the right breast.  

There is a group of lucent-centered calcifications in the right breast at 1 
o'clock middle depth 8 cm from the nipple.  This is not significantly changed in
overall distribution since .  

No other significant masses or calcifications are seen in the breast.  



IMPRESSION: BENIGN

The grouped lucent-centered calcifications in the right breast appear benign.  

There is no mammographic evidence of malignancy.  A 1 year screening mammogram 
is recommended. 



Professional services are provided [facility].



Timbo Sebastian M.D.          

cm/:2017 13:09:40  



Imaging Technologist: Jennie BECERRA(R)(BRUNO), [facility]

This exam was dictated and interpreted by [id number] for \[facility]\""[facility].  

letter sent: Normal exam  

Mammogram BI-RADS: 2 Benign

                                                          2017                 

                                                      -

                                        -





Read by:  Juaquin Swenson MD

Dictated Date/time:  17 13:09

Electronically Signed by:  Juaquin Swenson MD                      17 
13:09

FINAL REPORT

                                        DAMON Morales                    

 

                          Breast Mammo Scrn TIFFANI incl CAD MA                            Breast Mammo Scrn TIFFANI

 incl CAD [place]                             - BREAST MAMMO SCRN TIFFANI INCL CAD [place]

BILATERAL DIGITAL SCREENING MAMMOGRAM WITH CAD: 2017

CLINICAL: Screening/Z12.31.  



Current study was evaluated with a Computer Aided Detection (CAD) system.  

Comparison is made to exams dated:  12/10/2014 mammogram, 2013 mammogram 
and 2012 mammogram - [facility].  

There are scattered fibroglandular densities in both breasts.  

There is a cluster of calcifications in the right breast at 1 o'clock middle 
depth 8 cm from the nipple.  

No other significant masses, calcifications, or other findings are seen in 
either breast.  



IMPRESSION: INCOMPLETE: NEEDS ADDITIONAL IMAGING EVALUATION

The cluster of calcifications in the right breast is indeterminate.  Spot 
magnification views are recommended.  





Professional [facility].



Essie Oates M.D.          

/penrad:2017 11:34:17  



Imaging Technologist: More Villanueva [facility]

This exam was dictated and interpreted by [id number] at [facility].  

letter sent: Additional Imaging  

Mammogram BI-RADS: 0 Indeterminate

                                                          2017                 

                                                      -

                                        -





Read by:  Essie Oates MD

Dictated Date/time:  17 11:34

Electronically Signed by:  Essie Oates MD                  17 
11:34

FINAL REPORT

                                        DAMON Morales                    

 

                    Chest 2 views                            Chest 2 views                            Examination:

 Chest x-ray, 2 views

 

History: 714.0   Rheumatoid Arthritis

 

Comparison: 2014

 

Findings: The lungs are clear and without focal consolidation. The 
cardiomediastinal silhouette is within normal limits. No pleural effusion or 
pneumothorax is seen. The osseous structures are without focal abnormality. 

 

IMPRESSION: No acute cardiopulmonary disease. 

 

SL:  16

                                                          2015                 

                                                      -

                                        -





Read by:  Dave Hsu MD

Dictated Date/time:  01/06/15 15:35

Electronically Signed by:  Dave Hsu MD                         01/06/15 
15:35

FINAL REPORT

                                        [facility]                    



                                                                                
                                               



Vital Signs

                    





                    Vital Sign                            Value                            Date         

                          Comments                            Source                    

 

                    BMI Calculated                            35.23                             2015

                                                        [facility]                 

   

 

                    Weight                            81.818                             2015     

                                                      [facility]                    

 

                    Height                            152.4 cm                            2015    

                                                      [facility]                    

 

                    Weight                            179                             2014        

                                                      [facility] Blas                    

 

                    Height                            61                             2014         

                                                      León Blas                    

 

                    Temperature Oral (F)                            98.2 F                            2014

                                                        Le[facility] Blas               

     

 

                    Heart Rate                            76                             2014     

                                                      León Blas                    



 

                    Diastolic (mm Hg)                            84                             2014

                                                        Le[facility] Blas               

     

 

                    Systolic (mm Hg)                            124                             2014

                                                        Le[facility] Blas               

     

 

                    Weight                            183                             2014        

                                                      León Blas                    

 

                    Height                            61                             2014         

                                                      León Blas                    

 

                    Weight                            182                             2014        

                                                      León Blas                    

 

                    Height                            61                             2014         

                                                      Le[facility] Blas                    

 

                    Temperature Oral (F)                            98.4 F                            2014

                                                        [facility] Blas               

     

 

                    Heart Rate                            80                             2014     

                                                      Le[facility] Blas                    



 

                    Diastolic (mm Hg)                            80                             2014

                                                        [facility]               

     

 

                    Systolic (mm Hg)                            130                             2014

                                                        [facility]               

     



                                                                                
                                                                                
                                                                                
                                                                                
                    



Encounters

                    





                    Location                            Location Details                            Encounter

 Type                            Encounter Number                            Reason For

 Visit                            Attending Provider                            ADM Date

                            DC Date                            Status                

                                        Source                    

 

                    Braden Blas MD                                                        [facility]

                                        [id number]                   

                                                                              2014                                                        León Blas MD                                                        [facility]

                                        [id number]                   

                                                                              2014                                                        León Blas MD                                                        [facility]

                                        [id number]                   

                                                                              2014                                                        León Blas MD                                                        [facility]

                                        [id number]                   

                                                                              2014                                                        León Blas MD                                                        [facility]

                                        [id number]                   

                                                                              2014                                                        León Blas MD                                                        [facility]

                                        [id number]                   

                                                                              2014                                                        León Blas MD                                                        [facility]

                                        [id number]                   

                                                                              2014                                                        León Blas MD                                                        [facility]

                                        [id number]                   

                                                                              2014                                                        León Blas MD                                                        [facility]

                                        [id number]                   

                                                                              2014                                                        León Blas MD                                                        reschedule

 [id number]/u                                    [id number]               

                                                                              2014                                                     

                                        León Blas MD                                                        reschedule

 f/u                                    [id number]               

                                                                              2014                                                     

                                        León Blas MD                                                        reschedule

 f/u                                    [id number]               

                                                                              2014                                                     

                                        León Blas MD                                                        reschedule

 f/u                                    [id number]               

                                                                              2014                                                     

                                        León Blas MD                                                        reschedule

 f/u                                    [id number]               

                                                                              2014                                                     

                                        León Blas MD                                                        reschedule

 f/u                                    [id number]               

                                                                              2014                                                     

                                        León Blas MD                                                        reschedule

 f/u                                    [id number]               

                                                                              2014                                                     

                                        León Blas MD                                                        reschedule

 f/u                                    [id number]               

                                                                              2014                                                     

                                        León Blas MD                                                        reschedule

 f/u                                    [id number]               

                                                                              2014                                                     

                                        León Blas MD                                                        reschedule

 f/u                                    [id number]               

                                                                              2014                                                     

                                        León Blas MD                                                        reschedule

 f/u                                    [id number]               

                                                                              2014                                                     

                                        León Blas MD                                                        [place]

                                        [id number]                   

                                                                              2014                                                        León Blas MD                                                        [place]

                                        [id number]                   

                                                                              2014                                                        León Blas MD                                                        [place]

                                        [id number]                   

                                                                              2014                                                        León Blas MD                                                        [place]

                                        [id number]                   

                                                                              2014                                                        León Blas MD                                                        [place]

                                        [id number]                   

                                                                              2014                                                        León Blas MD                                                        [place]

                                        [id number]                   

                                                                              2014                                                        León Blas MD                                                        [place]

                                        [id number]                   

                                                                              2014                                                        León Blas MD                                                        [place]

                                        [id number]                   

                                                                              2014                                                        León Blas MD                                                        [place]

                                        [id number]                   

                                                                              2014                                                        León Blas MD                                                        [place]

                                        [id number]                   

                                                                              2014                                                        León Blas MD                                                        [place]

                                        [id number]                   

                                                                              2014                                                        León Blas MD                                                        [place]

                                        [id number]                   

                                                                              2014                                                        León Blas MD                                                        New order

 form For MRI                            [id number]      

                                                                                2014                                               

                                        León Blas MD                                                        New order

 form For MRI                            [id number]      

                                                                                2014                                               

                                        León Blas MD                                                        New order

 form For MRI                            [id number]      

                                                                                2014                                               

                                        León Blas MD                                                        New order

 form For MRI                            [id number]      

                                                                                2014                                               

                                        León Blas MD                                                        New order

 form For MRI                            [id number]      

                                                                                2014                                               

                                        León Blas MD                                                        New order

 form For MRI                            [id number]      

                                                                                2014                                               

                                        León Blas MD                                                        New order

 form For MRI                            [id number]      

                                                                                2014                                               

                                        León Blas MD                                                        New order

 form For MRI                            [id number]      

                                                                                2014                                               

                                        León Blas MD                                                        New order

 form For MRI                            [id number]      

                                                                                2014                                               

                                        León Blas MD                                                        New order

 form For MRI                            [id number]      

                                                                                2014                                               

                                        León Blas MD                                                        New order

 form For MRI                            [id number]      

                                                                                2014                                               

                                        León Blas MD                                                        New order

 form For MRI                            [id number]      

                                                                                2014                                               

                                        León Blas MD                                                        New order

 form For MRI                            [id number]      

                                                                                2014                                               

                                        León Blas MD                                                        Unknown 

                                        [id number]                    

                                                                              2014                                                        León Blas MD                                                        Unknown 

                                        [id number]                    

                                                                              2014                                                        León Blas MD                                                        Unknown 

                                        [id number]                    

                                                                              2014                                                        León Blas MD                                                        Unknown 

                                        [id number]                    

                                                                              2014                                                        León Blas MD                                                        Unknown 

                                        [id number]                    

                                                                              2014                                                        León Blas MD                                                        Unknown 

                                        [id number]                    

                                                                              2014                                                        León Blas MD                                                        Unknown 

                                        [id number]                    

                                                                              2014                                                        León Blas MD                                                        Unknown 

                                        [id number]                    

                                                                              2014                                                        León Blas MD                                                        Unknown 

                                        [id number]                    

                                                                              2014                                                        León Blas MD                                                        Referral

                                        [id number]                   

                                                                              2014                                                        León Blas MD                                                        Referral

                                        [id number]                   

                                                                              2014                                                        León Blas MD                                                        Referral

                                        [id number]                   

                                                                              2014                                                        León Blas MD                                                        Referral

                                        [id number]                   

                                                                              2014                                                        León Blas MD                                                        Referral

                                        [id number]                   

                                                                              2014                                                        León Blas MD                                                        Referral

                                        [id number]                   

                                                                              2014                                                        León Blas MD                                                        Referral

                                        [id number]                   

                                                                              2014                                                        León Blas MD                                                        Referral

                                        [id number]                   

                                                                              2014                                                        León Blas MD                                                        Referral

                                        [id number]                   

                                                                              2014                                                        León Blas MD                                                        Referral

                                        [id number]                   

                                                                              2014                                                        León Blas MD                                                        pt call 

                                        [id number]                    

                                                                              2014                                                        León Blas MD                                                        pt call 

                                        [id number]                    

                                                                              2014                                                        León Blas MD                                                        pt call 

                                        [id number]                    

                                                                              2014                                                        León Blas MD                                                        pt call 

                                        [id number]                    

                                                                              2014                                                        León Blas MD                                                        pt call 

                                        [id number]                    

                                                                              2014                                                        León Blas MD                                                        pt call 

                                        [id number]                    

                                                                              2014                                                        León Blas MD                                                        pt call 

                                        [id number]                    

                                                                              2014                                                        León Blas MD                                                        pt call 

                                        [id number]                    

                                                                              2014                                                        León Blas MD                                                        pt call 

                                        [id number]                    

                                                                              2014                                                        León Blas MD                                                        MRI     

                                        [id number]                        

                                                                              2014                                                        León Blas MD                                                        MRI     

                                        [id number]                        

                                                                              2014                                                        León Blas MD                                                        MRI     

                                        [id number]                        

                                                                              2014                                                        León Blas MD                                                        MRI     

                                        [id number]                        

                                                                              2014                                                        León Blas MD                                                        MRI     

                                        [id number]                        

                                                                              2014                                                        León Blas MD                                                        MRI     

                                        [id number]                        

                                                                              2014                                                        León Blas MD                                                        MRI     

                                        [id number]                        

                                                                              2014                                                        León Blas MD                                                        MRI     

                                        [id number]                        

                                                                              2014                                                        León Blas MD                                                        MRI     

                                        [id number]                        

                                                                              2014                                                        León Blas MD                                                        Pt back 

pain update                             [id number]        

                                                                              2014                                               

                                        León Blas MD                                                        Pt back 

pain update                             [id number]        

                                                                              2014                                               

                                        León Blas MD                                                        Pt back 

pain update                             [id number]        

                                                                              2014                                               

                                        León Blas MD                                                        Pt back 

pain update                             [id number]        

                                                                              2014                                               

                                        León Blas MD                                                        Pt back 

pain update                             [id number]        

                                                                              2014                                               

                                        León Blas MD                                                        Pt back 

pain update                             [id number]        

                                                                              2014                                               

                                        León Blas MD                                                        Pt back 

pain update                             [id number]        

                                                                              2014                                               

                                        León Blas MD                                                        Pt back 

pain update                             [id number]        

                                                                              2014                                               

                                        León Blas MD                                                        Pt back 

pain update                             [id number]        

                                                                              2014                                               

                                        León Blas MD                                                        Gabapentin

                                        [id number]                   

                                                                              2014                                                        León Blas MD                                                        Gabapentin

                                        [id number]                   

                                                                              2014                                                        León Blas MD                                                        Gabapentin

                                        [id number]                   

                                                                              2014                                                        León Blas MD                                                        Gabapentin

                                        [id number]                   

                                                                              2014                                                        León Blas MD                                                        Gabapentin

                                        [id number]                   

                                                                              2014                                                        León Blas MD                                                        Gabapentin

                                        [id number]                   

                                                                              2014                                                        León Blas MD                                                        Gabapentin

                                        [id number]                   

                                                                              2014                                                        León Blas MD                                                        Gabapentin

                                        [id number]                   

                                                                              2014                                                        León Blas MD                                                        Gabapentin

                                        [id number]                   

                                                                              2014                                                        León Blas MD                                                        refill hydrocodone

                                        [id number]                   

                                                                              2014                                                        León Blas MD                                                        refill hydrocodone

                                        [id number]                   

                                                                              2014                                                        León Blas MD                                                        refill hydrocodone

                                        [id number]                   

                                                                              2014                                                        León Blas MD                                                        refill hydrocodone

                                        [id number]                   

                                                                              2014                                                        León Blas MD                                                        refill hydrocodone

                                        [id number]                   

                                                                              2014                                                        León Blas MD                                                        refill hydrocodone

                                        [id number]                   

                                                                              2014                                                        León Blas MD                                                        refill hydrocodone

                                        [id number]                   

                                                                              2014                                                        León Blas MD                                                        refill hydrocodone

                                        [id number]                   

                                                                              2014                                                        León Blas MD                                                        refill hydrocodone

                                        [id number]                   

                                                                              2014                                                        León Blas MD                                                        No longer

 wants to be seen here                            [id number]

                                                                                      07/15/2014

                            07/15/2014                                               

                                        León Blas MD                                                        No longer

 wants to be seen here                            [id number]

                                                                                      07/15/2014

                            07/15/2014                                               

                                        León Blas                    

 

                          [facility]                                                

                          Outpt Diag Services                            [id number]                  

                                                Wes Sepulveda                             12/10/2014

                            2014                                               

                                         RACHID Blas MD                                                        F/U     

                                        [id number]                        

                                                                              2014                                                        León Blas                    

 

                          [facility]                                               

                    Outpatient                            [id number]                            

                            Wadannie Nelsy                             2015                                                   

                                        [facility]                                                

                          Outpt Diag Services                            [id number]                  

                                                [facility]                             2017                                               

                                         RACHID Morales                    

 

                          [facility]                                                

                          Outpt Diag Services                            [id number]                  

                                                [facility]                             2017                                               

                                         RACHID Morales                    



                                                                                
                                                                                
                                                                                
                                                                                
                                                                                
                                                                                
                                                                                
                                                                                
                                                                                
                                                                                
                                                                                
                                                                                
                                                                                
                                                                                
                                                                                
                                                                                
                                                                                
                                                                                
      



Procedures

                    





                    Procedure                            Code                            Date           

                          Perfomer                            Comments                        

                                        Source

## 2019-02-13 NOTE — XMS REPORT
Braden Blas MD

                             Created on: 2014



Bailey Teague

External Reference #: [id number]

: 1940

Sex: Female



Demographics







                          Address                   [address] [place] [address] [address]

[place]  [address]

 

                          Home Phone                [phone]

 

                          Preferred Language        Unknown

 

                          Marital Status            Unknown

 

                          Church Affiliation     Unknown

 

                          Race                      Unknown

 

                          Ethnic Group              Non-





Author







                          Author                    Braden Blas

 

                          [facility]              [employer]

 

                          Address                   Unknown

 

                          Phone                     Unavailable







Care Team Providers







                    Care Team Member Name    Role                Phone

 

                    Braden Blas CP                  Unavailable



                                            



Encounters

          





                    Encounter           Location            Date

 

                    Referral            Braden Blas MD    May 19, 2014

 

                    pt call             Braden Blas MD    2014

 

                    LEG PAINS           Braden Blas MD    2014

 

                    Unknown             Braden Blas MD    May 14, 2014

 

                    New order form For MRI    Braden Blas MD    2014

 

                    refill hydrocodone    Braden Blas MD    2014

 

                    Gabapentin          Braden Blas MD    2014

 

                    No longer wants to be seen here    Braden Blas MD    July 15, 2014

 

                    reschedule f/u      Braden Blas MD    2014

 

                    Gabapentin          Braden Blas MD    2014

 

                    MRI                 Braden Blas MD    2014

 

                    Pt back pain update    Braden Blas MD    2014



                                                                                
                                                                                
                                    



Problems

          





             Problem Type    Condition    ICD-9 Code    Onset Dates    Condition Status

 

             Problem      Allergy, unspecified not elsewhere classified    995.3                     Active

 

             Problem      Inflammatory Arthritis    714.0                     Active

 

             Problem      Lumbar Radiculopathy    724.4                     Active

 

             Problem      Polyarthritis, multiple sites    716.59                    Active



                                                                                
                                     



Social History

          





                    Social History Element    Qualifiers          Date Reported

 

                          Tobacco Use:              .   Are you a:: former smoker , How long has it been since you last

 smoked?: 5-10 years                    May 14, 2014

 

                    Pets:               .  dogs             May 14, 2014

 

                    Caffeine:           yes.  frequency:, 1-5    May 14, 2014

 

                    Exercise:           yes.  walking 3 times a week    May 14, 2014

 

                    Alcohol:            no.                 May 14, 2014

 

                    Occupation:         .  retired          May 14, 2014



                                                                                
                                     



Summary Purpose

          eClinical[employer] Submission

## 2019-02-13 NOTE — XMS REPORT
Braden Blas MD

                             Created on: 07/10/2014



Ho Bailey

External Reference #: [id number]

: 1940

Sex: Female



Demographics







                          Address                   [address] [place] [address] [address]

[place]  [address]

 

                          Home Phone                [phone]

 

                          Preferred Language        Unknown

 

                          Marital Status            Unknown

 

                          Taoist Affiliation     Unknown

 

                          Race                      Unknown

 

                          Ethnic Group              Non-





Author







                          Braden Navarro

 

                          [facility]              [employer]

 

                          Address                   Unknown

 

                          Phone                     Unavailable







Care Team Providers







                    Care Team Member Name    Role                Phone

 

                    Braden Blas CP                  Unavailable



                                            



Encounters

          





                    Encounter           Location            Date

 

                    Referral            Braden Blas MD    May 19, 2014

 

                    pt call             Braden Blas MD    2014

 

                    LEG PAINS           Braden Blas MD    2014

 

                    Unknown             Braden Blas MD    May 14, 2014

 

                    New order form For MRI    Braden Blas MD    2014

 

                    refill hydrocodone    Braden Blas MD    2014

 

                    Gabapentin          Braden Blas MD    2014

 

                    reschedule f/u      Braden Blas MD    2014

 

                    Gabapentin          Braden Blas MD    2014

 

                    MRI                 Braden Blas MD    2014

 

                    Pt back pain update    Braden Blas MD    2014



                                                                                
                                                                                
                          



Problems

          





             Problem Type    Condition    ICD-9 Code    Onset Dates    Condition Status

 

             Problem      Allergy, unspecified not elsewhere classified    995.3                     Active

 

             Problem      Inflammatory Arthritis    714.0                     Active

 

             Problem      Lumbar Radiculopathy    724.4                     Active

 

             Problem      Polyarthritis, multiple sites    716.59                    Active



                                                                                
                                     



Social History

          





                    Social History Element    Qualifiers          Date Reported

 

                          Tobacco Use:              .   Are you a:: former smoker , How long has it been since you last

 smoked?: 5-10 years                    May 14, 2014

 

                    Pets:               .  dogs             May 14, 2014

 

                    Caffeine:           yes.  frequency:, 1-5    May 14, 2014

 

                    Exercise:           yes.  walking 3 times a week    May 14, 2014

 

                    Alcohol:            no.                 May 14, 2014

 

                    Occupation:         .  retired          May 14, 2014



                                                                                
                                     



Summary Purpose

          eClinicalWorks Submission

## 2019-02-13 NOTE — XMS REPORT
Braden Blas MD

                             Created on: 2014



Bailey Teague

External Reference #: [id number]

: 1940

Sex: Female



Demographics







                          Address                   [address] [place] [address] [address]

[place]  [address]

 

                          Home Phone                [phone]

 

                          Preferred Language        Unknown

 

                          Marital Status            Unknown

 

                          Mormonism Affiliation     Unknown

 

                          Race                      Unknown

 

                          Ethnic Group              Non-





Author







                          Braden Navarro

 

                          [facility]              e[employer]

 

                          Address                   Unknown

 

                          Phone                     Unavailable







Care Team Providers







                    Care Team Member Name    Role                Phone

 

                    Braden Blas                       Unavailable



                                            



Encounters

          





                    Encounter           Location            Date

 

                    Referral            Braden Blas MD    May 19, 2014

 

                    New order form For MRI    Braden Blas MD    2014

 

                    Gabapentin          Braden Blas MD    2014

 

                    reschedule f/u      Braden Blas MD    2014



                                                                                
                                     



Problems

          





             Problem Type    Condition    ICD-9 Code    Onset Dates    Condition Status

 

             Problem      Allergy, unspecified not elsewhere classified    995.3                     Active

 

             Problem      Inflammatory Arthritis    714.0                     Active

 

             Problem      Lumbar Radiculopathy    724.4                     Active

 

             Problem      Polyarthritis, multiple sites    716.59                    Active

 

             Assessment    Inflammatory Arthritis    714.0                     Active



                                                                                
                                               



Medications

          





        Medication    Code System    Code    Instructions    Start Date    End Date    Status    Dosage



 

           Gabapentin    MEDISPAN    [address]-91    300 MG Orally at bedtime    2014     

                          Active                    1 -2 capsule

 

                Hydrocodone-Acetaminophen    MEDISPAN        [address]-28    5-325 MG Orally every 12 hours

                2014    May 24, 2014    Active          1 tablet as needed



                                                                                
                 



Social History

          





                    Social History Element    Qualifiers          Date Reported

 

                          Tobacco Use:              .   Are you a:: former smoker , How long has it been since you last

 smoked?: 5-10 years                    May 14, 2014

 

                    Pets:               .  dogs             May 14, 2014

 

                    Caffeine:           yes.  frequency:, 1-5    May 14, 2014

 

                    Exercise:           yes.  walking 3 times a week    May 14, 2014

 

                    Alcohol:            no.                 May 14, 2014

 

                    Occupation:         .  retired          May 14, 2014



                                                                                
                                     



Summary Purpose

          eClinicalWorks Submission

## 2019-02-13 NOTE — XMS REPORT
Braden Blas MD

                             Created on: 07/10/2014



Bailey Teague

External Reference #: [id number]

: 1940

Sex: Female



Demographics







                          Address                   [address] [place] [address] [address]

[place]  [address]

 

                          Home Phone                [phone]

 

                          Preferred Language        Unknown

 

                          Marital Status            Unknown

 

                          Episcopal Affiliation     Unknown

 

                          Race                      Unknown

 

                          Ethnic Group              Non-





Author







                          Braden Navarro

 

                          [facility]              [employer]

 

                          Address                   Unknown

 

                          Phone                     Unavailable







Care Team Providers







                    Care Team Member Name    Role                Phone

 

                    Braden Blas CP                  Unavailable



                                            



Encounters

          





                    Encounter           Location            Date

 

                    Referral            Braden Blas MD    May 19, 2014

 

                    pt call             Braden Blas MD    2014

 

                    LEG PAINS           Braden Blas MD    2014

 

                    Unknown             Braden Blas MD    May 14, 2014

 

                    New order form For MRI    Braden Blas MD    2014

 

                    refill hydrocodone    Braden Blas MD    2014

 

                    Gabapentin          Braden Blas MD    2014

 

                    reschedule f/u      Braden Blas MD    2014

 

                    Gabapentin          Braden Blas MD    2014

 

                    MRI                 Braden Blas MD    2014

 

                    Pt back pain update    Braden Blas MD    2014



                                                                                
                                                                                
                          



Problems

          





             Problem Type    Condition    ICD-9 Code    Onset Dates    Condition Status

 

             Problem      Allergy, unspecified not elsewhere classified    995.3                     Active

 

             Problem      Inflammatory Arthritis    714.0                     Active

 

             Problem      Lumbar Radiculopathy    724.4                     Active

 

             Problem      Polyarthritis, multiple sites    716.59                    Active

 

             Assessment    Inflammatory Arthritis    714.0                     Active



                                                                                
                                               



Social History

          





                    Social History Element    Qualifiers          Date Reported

 

                          Tobacco Use:              .   Are you a:: former smoker , How long has it been since you last

 smoked?: 5-10 years                    May 14, 2014

 

                    Pets:               .  dogs             May 14, 2014

 

                    Caffeine:           yes.  frequency:, 1-5    May 14, 2014

 

                    Exercise:           yes.  walking 3 times a week    May 14, 2014

 

                    Alcohol:            no.                 May 14, 2014

 

                    Occupation:         .  retired          May 14, 2014



                                                                                
                                     



Summary Purpose

          eClinicalWorks Submission

## 2019-02-13 NOTE — XMS REPORT
Braden Blas MD

                             Created on: 07/10/2014



Bailey Teague

External Reference #: [id number]

: 1940

Sex: Female



Demographics







                          Address                   [address] [place] [address] [address]

[place]  [address]

 

                          Home Phone                [phone]

 

                          Preferred Language        Unknown

 

                          Marital Status            Unknown

 

                          Oriental orthodox Affiliation     Unknown

 

                          Race                      Unknown

 

                          Ethnic Group              Non-





Author







                          Marc Hoffmann

 

                          [facility]              [employer]Works

 

                          Address                   Unknown

 

                          Phone                     Unavailable







Care Team Providers







                    Care Team Member Name    Role                Phone

 

                    Marc Guillen                          Unavailable



                                                                



Allergies, Adverse Reactions, Alerts

          





                    Substance           Reaction            Event Type

 

                    N.K.D.A.            Info Not Available    Non Drug Allergy



                                                                    



Encounters

          





                    Encounter           Location            Date

 

                    Referral            Braden Blas MD    May 19, 2014

 

                    pt call             Braden Blas MD    2014

 

                    LEG PAINS           Braden Blas MD    2014

 

                    Unknown             Braden Blas MD    May 14, 2014

 

                    New order form For MRI    Braden Blas MD    2014

 

                    refill hydrocodone    Braden Blas MD    2014

 

                    Gabapentin          Braden Blas MD    2014

 

                    reschedule f/u      Braden Blas MD    2014

 

                    Gabapentin          Braden Blas MD    2014

 

                    MRI                 Braden Blas MD    2014

 

                    Pt back pain update    Braden Blas MD    2014



                                                                                
                                                                                
                          



Problems

          





             Problem Type    Condition    ICD-9 Code    Onset Dates    Condition Status

 

             Problem      Allergy, unspecified not elsewhere classified    995.3                     Active

 

             Problem      Inflammatory Arthritis    714.0                     Active

 

             Problem      Lumbar Radiculopathy    724.4                     Active

 

             Assessment    Lumbar Radiculopathy    724.4                     Active

 

             Problem      Polyarthritis, multiple sites    716.59                    Active

 

             Assessment    Polyarthritis, multiple sites    716.59                    Active



                                                                                
                                                         



Medications

          





        Medication    Code System    Code    Instructions    Start Date    End Date    Status    Dosage



 

        [facility] Sodium    [facility]    [address]-74    70 MG Orally                     Active    1 tablet



 

          Pravastatin Sodium    [facility]    [address]-28    80 MG Orally Once a day                        Active

                                        1 tablet

 

                Hydrocodone-Acetaminophen    [facility]        [address]-13    5-325 MG Orally every 12 hours

                2014    May 24, 2014    Active          1 tablet as needed

 

        Zetia    [facility]    [address]-23    10 MG Orally Once a day                    Active    1 tablet

 

           Gabapentin    [facility]    [address]-26    300 MG Orally at bedtime    2014     

                          Active                    1 -2 capsule

 

        Naproxen    [facility]    [address]-61    500 MG Orally every 12 hrs                    Active    1 tablet

 as needed

 

             Losartan Potassium-HCTZ    [facility]     [address]-23    50-12.5 MG Orally Once a day    

                                        Active              1 tablet

 

        Tramadol    Unknown    0       50 mg orally every 4-6 hours prn pain                    Active    one tab





                                                                                
                                                                             



Social History

          





                    Social History Element    Qualifiers          Date Reported

 

                          Tobacco Use:              .   Are you a:: former smoker , How long has it been since you last

 smoked?: 5-10 years                    May 14, 2014

 

                    Pets:               .  dogs             May 14, 2014

 

                    Caffeine:           yes.  frequency:, 1-5    May 14, 2014

 

                    Exercise:           yes.  walking 3 times a week    May 14, 2014

 

                    Alcohol:            no.                 May 14, 2014

 

                    Occupation:         .  retired          May 14, 2014



                                                                                
                                                                   



Vital Signs

          





                          Date/Time:                May 14, 2014

 

                          Weight                    183 lbs

 

                          Height                    61 in



                                                                    



Results

          





                                         

 

                                        DS DNA-Crithidia Ifa w/ Reflex

 

                          DNA AB (DS) CRITHIDIA,IFA(-NEGATIVE )    NEGATIVE

 

                          DNA AB (DS) CRITHIDIA TITER(- )    TNP

 

                                        C3, C4, COMPLEMENT

 

                          COMPLEMENT COMPONENT C3C(- mg/dL)    161

 

                          COMPLEMENT COMPONENT C4C(-ADULTS: 16-47 mg/dL)    47

 

                          COMPLEMENT, TOTAL (CH50)(-31-60 U/mL)    50

 

                                        SM AND SM/RNP ANTIBODIES

 

                          SM/RNP ANTIBODY(-<1.0 NEG AI)    <1.0 NEG

 

                          SM ANTIBODY(-<1.0 NEG AI)    <1.0 NEG



                                                                                
       



Summary Purpose

          eClinicalWorks Submission

## 2019-02-13 NOTE — XMS REPORT
Braden Blas MD

                             Created on: 2014



Bailey Teague

External Reference #: [id number]

: 1940

Sex: Female



Demographics







                          Address                   [address] [place] [address] [address]

[place]  [address]

 

                          Home Phone                [phone]

 

                          Preferred Language        Unknown

 

                          Marital Status            Unknown

 

                          Mandaeism Affiliation     Unknown

 

                          Race                      Unknown

 

                          Ethnic Group              Non-





Author







                          Braden Navarro

 

                          [facility]              [employer]

 

                          Address                   Unknown

 

                          Phone                     Unavailable







Care Team Providers







                    Care Team Member Name    Role                Phone

 

                    Braden Blas                       Unavailable



                                            



Encounters

          





                    Encounter           Location            Date

 

                    New order form For MRI    Braden Blas MD    2014

 

                    Gabapentin          Braden Blas MD    2014



                                                                                
                 



Problems

          





             Problem Type    Condition    ICD-9 Code    Onset Dates    Condition Status

 

             Problem      Inflammatory Arthritis    714.0                     Active

 

             Problem      Polyarthritis, multiple sites    716.59                    Active

 

             Problem      Allergy, unspecified not elsewhere classified    995.3                     Active



                                                                                
                           



Medications

          





        Medication    Code System    Code    Instructions    Start Date    End Date    Status    Dosage



 

          Gabapentin    [facility]       [address]-22    300 MG Orally at bedtime    2014              Active

                                        1 -2 capsule



                                                                                
       



Social History

          





                    Social History Element    Qualifiers          Date Reported

 

                    Pets:               .  dogs             2014

 

                    Caffeine:           yes.  frequency:, 1-5    2014

 

                    Exercise:           yes.  walking 3 times a week    2014

 

                    Alcohol:            no.                 2014

 

                    Occupation:         .  retired          2014



                                                                                
                                                         



Vital Signs

          





                          Date/Time:                2014

 

                          Weight                    182 lbs

 

                          Height                    61 inches

 

                          Temperature               98.4 F

 

                          Cardiac Monitoring Heart Rate    80 Beats per Minute

 

                          Blood Pressure Diastolic    80 mm Hg

 

                          Blood Pressure Systolic    130 mm Hg



                                                                    



Summary Purpose

          eClinicalWorks Submission

## 2019-02-13 NOTE — XMS REPORT
Braden Blas MD

                             Created on: 07/10/2014



Ho Bailey

External Reference #: [id number]

: 1940

Sex: Female



Demographics







                          Address                   [address] [place] [address] [address]

[place]  [address]

 

                          Home Phone                [phone]

 

                          Preferred Language        Unknown

 

                          Marital Status            Unknown

 

                          Confucianist Affiliation     Unknown

 

                          Race                      Unknown

 

                          Ethnic Group              Non-





Author







                          Marc Hoffmann

 

                          [facility]              eClinicalWorks

 

                          Address                   Unknown

 

                          Phone                     Unavailable







Care Team Providers







                    Care Team Member Name    Role                Phone

 

                    Marc Guillen                          Unavailable



                                                                



Allergies, Adverse Reactions, Alerts

          





                    Substance           Reaction            Event Type

 

                    N.K.D.A.            Info Not Available    Non Drug Allergy



                                                                    



Encounters

          





                    Encounter           Location            Date

 

                    Referral            Braden Blas MD    May 19, 2014

 

                    pt call             Braden Blas MD    2014

 

                    LEG PAINS           Braden Blas MD    2014

 

                    Unknown             Braden Blas MD    May 14, 2014

 

                    New order form For MRI    Braden Blas MD    2014

 

                    refill hydrocodone    Braden Blas MD    2014

 

                    Gabapentin          Braden Blas MD    2014

 

                    reschedule f/u      Braden Blas MD    2014

 

                    Gabapentin          Braden Blas MD    2014

 

                    MRI                 Braden Blas MD    2014

 

                    Pt back pain update    Braden Blas MD    2014



                                                                                
                                                                                
                          



Problems

          





             Problem Type    Condition    ICD-9 Code    Onset Dates    Condition Status

 

             Problem      Inflammatory Arthritis    714.0                     Active

 

             Problem      Polyarthritis, multiple sites    716.59                    Active

 

             Problem      Allergy, unspecified not elsewhere classified    995.3                     Active

 

             Assessment    Inflammatory Arthritis    714.0                     Active

 

             Assessment    Lumbar Radiculopathy    724.4                     Active



                                                                                
                                               



Medications

          





        Medication    Code System    Code    Instructions    Start Date    End Date    Status    Dosage



 

          Pravastatin Sodium    [facility]    [address]-25    80 MG Orally Once a day                        Active

                                        1 tablet

 

        Alendronate Sodium    [facility]    [address]-31    70 MG Orally                     Active    1 tablet



 

        Tramadol    Unknown    0       50 mg orally every 4-6 hours prn pain                    Active    one tab



 

           Gabapentin    [facility]    [address]-37    300 MG Orally at bedtime    2014     

                          Active                    1 -2 capsule

 

                Hydrocodone-Acetaminophen    [facility]        [address]-70    5-325 MG Orally every 12 hours

                2014    May 24, 2014    Active          1 tablet as needed

 

          Ranitidine 75    [facility]    [address]-94    75 MG Orally three times a day                        Active

                                        as directed

 

          Cetirizine HCl    [facility]    [address]-43    10 MG Orally Twice a day                        Active 

                                        1 tablet

 

        Zetia    [facility]    [address]-61    10 MG Orally Once a day                    Active    1 tablet

 

             Losartan Potassium-HCTZ    [facility]     [address]-54    50-12.5 MG Orally Once a day    

                                        Active              1 tablet



                                                                                
                                                                                
      



Social History

          





                    Social History Element    Qualifiers          Date Reported

 

                          Tobacco Use:              .   Are you a:: former smoker , How long has it been since you last

 smoked?: 5-10 years                    May 14, 2014

 

                    Pets:               .  dogs             May 14, 2014

 

                    Caffeine:           yes.  frequency:, 1-5    May 14, 2014

 

                    Exercise:           yes.  walking 3 times a week    May 14, 2014

 

                    Alcohol:            no.                 May 14, 2014

 

                    Occupation:         .  retired          May 14, 2014



                                                                                
                                                                   



Vital Signs

          





                          Date/Time:                2014

 

                          Weight                    182 lbs

 

                          Height                    61 in

 

                          Temperature               98.4 F

 

                          Cardiac Monitoring Heart Rate    80 /min

 

                          Blood Pressure Diastolic    80 mm Hg

 

                          Blood Pressure Systolic    130 mm Hg



                                                                    



Results

          





                                         

 

                                        CYCLIC CITRULLINATED PEPTIDE (CCP) AB (IGG)

 

                          CYCLIC CITRULLINATED PEPTIDE (CCP) AB (IGG)(- UNITS)    <16

 

                                        SUMIT IFA SCREEN W/REFL TO TITER AND PATTERN, IFA

 

                          SUMIT SCREEN, IFA(-NEGATIVE )    POSITIVE

 

                          SUMIT TITER(- titer)        > OR=1:1280

 

                          SUMIT PATTERN(- )           CENTROMERE

 

                                        Centromere Antibody Screen

 

                          CENTROMERE B ANTIBODY(-<1.0 NEG AI)    >8.0 POS

 

                                        COMPREHENSIVE METABOLIC PANEL W/EGFR

 

                          SODIUM(-135-146 mmol/L)    137

 

                          BUN/CREATININE RATIO(-6-22 (calc))    NOT APPLICABLE

 

                          CHLORIDE(- mmol/L)    101

 

                          POTASSIUM(-3.5-5.3 mmol/L)    4.3

 

                          CALCIUM(-8.6-10.4 mg/dL)    9.3

 

                          PROTEIN, TOTAL(-6.1-8.1 g/dL)    7.0

 

                          CARBON DIOXIDE(-19-30 mmol/L)    28

 

                          ALBUMIN/GLOBULIN RATIO(-1.0-2.5 (calc))    1.5

 

                          eGFR NON-AFR. AMERICAN(-> OR=60 mL/min/1.73m2)    73

 

                          BILIRUBIN, TOTAL(-0.2-1.2 mg/dL)    0.3

 

                          eGFR AFRICAN AMERICAN(-> OR=60 mL/min/1.73m2)    84

 

                          UREA NITROGEN (BUN)(-7-25 mg/dL)    18

 

                          ALBUMIN(-3.6-5.1 g/dL)    4.2

 

                          GLOBULIN(-1.9-3.7 g/dL (calc))    2.8

 

                          CREATININE(-0.60-0.93 mg/dL)    0.81

 

                          ALT(-6-29 U/L)            22

 

                          GLUCOSE(-65-99 mg/dL)     85

 

                          ALKALINE PHOSPHATASE(- U/L)    60

 

                          AST(-10-35 U/L)           20

 

                                        SED RATE BY MODIFIED WESTERGREN

 

                          SED RATE BY MODIFIED WESTERGREN(-< OR=30 mm/h)    14

 

                                        SCL-70 ANTIBODY

 

                          SCL-70 ANTIBODY(-<1.0 NEG AI)    <1.0 NEG

 

                                        CBC (INCLUDES DIFF/PLT)

 

                          WHITE BLOOD CELL COUNT(-3.8-10.8 Thousand/uL)    8.9

 

                          RED BLOOD CELL COUNT(-3.80-5.10 Million/uL)    4.47

 

                          HEMOGLOBIN(-11.7-15.5 g/dL)    12.3

 

                          RDW(-11.0-15.0 %)         14.8

 

                          PLATELET COUNT(-140-400 Thousand/uL)    325

 

                          BASOPHILS(- %)            0.4

 

                          EOSINOPHILS(- %)          5.1

 

                          HEMATOCRIT(-35.0-45.0 %)    38.7

 

                          MONOCYTES(- %)            8.7

 

                          MCV(-80.0-100.0 fL)       86.7

 

                          LYMPHOCYTES(- %)          17.6

 

                          MCH(-27.0-33.0 pg)        27.5

 

                          NEUTROPHILS(- %)          68.2

 

                          MCHC(-32.0-36.0 g/dL)     31.7

 

                          ABSOLUTE NEUTROPHILS(-9548-3203 cells/uL)    6070

 

                          ABSOLUTE LYMPHOCYTES(-850-3900 cells/uL)    1566

 

                          ABSOLUTE MONOCYTES(-200-950 cells/uL)    774

 

                          ABSOLUTE EOSINOPHILS(- cells/uL)    454

 

                          ABSOLUTE BASOPHILS(-0-200 cells/uL)    36

 

                                        RHEUMATOID FACTOR

 

                          RHEUMATOID FACTOR(-<14 IU/mL)    11

 

                                        C-REACTIVE PROTEIN

 

                          C-REACTIVE PROTEIN(-<0.80 mg/dL)    0.95



                                                                                
                                                                   



Summary Purpose

          eClinicalWorks Submission

## 2019-02-13 NOTE — XMS REPORT
Braden Blas MD

                             Created on: 07/10/2014



Ho Bailey

External Reference #: [id number]

: 1940

Sex: Female



Demographics







                          Address                   [address] [place] [address] [address]

[place]  [address]

 

                          Home Phone                [phone]

 

                          Preferred Language        Unknown

 

                          Marital Status            Unknown

 

                          Anabaptism Affiliation     Unknown

 

                          Race                      Unknown

 

                          Ethnic Group              Non-





Author







                          Braden Navarro

 

                          [facility]              eClinicalWorks

 

                          Address                   Unknown

 

                          Phone                     Unavailable







Care Team Providers







                    Care Team Member Name    Role                Phone

 

                    Braden Blas CP                  Unavailable



                                            



Encounters

          





                    Encounter           Location            Date

 

                    Referral            Braden Blas MD    May 19, 2014

 

                    pt call             Braden Blsa MD    2014

 

                    LEG PAINS           Braden Blas MD    2014

 

                    Unknown             Braden Blas MD    May 14, 2014

 

                    New order form For MRI    Braden Blas MD    2014

 

                    refill hydrocodone    Braden Blas MD    2014

 

                    Gabapentin          Braden Blas MD    2014

 

                    reschedule f/u      Braden Blas MD    2014

 

                    Gabapentin          Braden Blas MD    2014

 

                    MRI                 Braden Blas MD    2014

 

                    Pt back pain update    Braden Blas MD    2014



                                                                                
                                                                                
                          



Problems

          





             Problem Type    Condition    ICD-9 Code    Onset Dates    Condition Status

 

             Problem      Allergy, unspecified not elsewhere classified    995.3                     Active

 

             Problem      Inflammatory Arthritis    714.0                     Active

 

             Problem      Lumbar Radiculopathy    724.4                     Active

 

             Problem      Polyarthritis, multiple sites    716.59                    Active



                                                                                
                                     



Medications

          





        Medication    Code System    Code    Instructions    Start Date    End Date    Status    Dosage



 

                Hydrocodone-Acetaminophen    [facility]        [address]-38    2.5-500 MG Orally every 6 hrs

                2014                    Active          1 tablet as needed



                                                                                
       



Social History

          





                    Social History Element    Qualifiers          Date Reported

 

                          Tobacco Use:              .   Are you a:: former smoker , How long has it been since you last

 smoked?: 5-10 years                    May 14, 2014

 

                    Pets:               .  dogs             May 14, 2014

 

                    Caffeine:           yes.  frequency:, 1-5    May 14, 2014

 

                    Exercise:           yes.  walking 3 times a week    May 14, 2014

 

                    Alcohol:            no.                 May 14, 2014

 

                    Occupation:         .  retired          May 14, 2014



                                                                                
                                     



Summary Purpose

          eClinicalWorks Submission

## 2019-02-13 NOTE — XMS REPORT
Patient Summary Document

                             Created on: 2019



SOLO SANCHEZ

External Reference #: [id number]

: 1940

Sex: Female



Demographics







                          Address                   [address] 

[place]  [address]

 

                          Home Phone                [phone]

 

                          Preferred Language        Unknown

 

                          Marital Status            Unknown

 

                          Yarsani Affiliation     Unknown

 

                          Race                      Unknown

 

                                        Additional Race(s)  

 

                          Ethnic Group              Unknown





Author







                          Author                    [facility]

 

                          [facility]

 

                          Address                   Unknown

 

                          Phone                     Unavailable







Support







                Name            Relationship    Address         Phone

 

                    GRISELDA CONKLIN    PRS                 [address]

[place], [place]  [address] [phone]

 

                    GRISELDA CONKLIN    [employer]S                 [place]

[place]  [address]                     [phone]

 

                    GRISELDA CONKLIN    PRS                 N/[place]

[place]  [address]                     [phone]

 

                    KRISTINA MEHTA         PRS                 [address]

[id number]

[place], [place]  [address] [phone]







Care Team Providers







                    Care Team Member Name    Role                Phone

 

                    PANDA MARSHALL       Unavailable         Unavailable

 

                    SHAISTA DUARTE    Unavailable         Unavailable







Payers







             Payer Name    Policy Type    Policy Number    Effective Date    Expiration Date







Problems

This patient has no known problems.



Allergies, Adverse Reactions, Alerts







          Allergy Name    Allergy Type    Status    Severity    Reaction(s)    Onset Date    Inactive 

Date                      Treating Clinician        Comments

 

        No Known Allergies    DA      Active    U               2016 00:00:00                     







Medications

This patient has no known medications.



Results







           Test Description    Test Time    Test Comments    Text Results    Atomic Results    Result

 Comments

 

                MRI SPINE LUMBAR WO                                        [place] [address]      Patient Name: SOLO SANCHEZ   
MR #: [id number]    : 1940 Age/Sex: 77/F  Acct #: [id number] Req #: 
[id number]  Adm Physician:     Ordered by: PANDA MARSHALL DO  Report #: [id number]
[id number]   Location: MRI  Room/Bed:     
________________________________________________________________________________
___________________    Procedure: [id number] MRI/MRI SPINE LUMBAR WO  Exam Date: 
17                            Exam Time: 1455       REPORT STATUS: Signed 
  Examination: MRI SPINE LUMBAR WITHOUT CONTRAST      History: 77-year-old 
female with chronic back pain radiating down the right   lower extremity.   
Comparison studies: X-rays of lumbar spine performed 2017 and   
2010.      Technique:    Sagittal, coronal  and axial T2 , sagittal
T1 and STIR; axial  spin density   oblique.      Findings:      Number of lumbar
vertebral bodies: Five.      Alignment: Normal lordosis. Mild leftward curvature
centered at L3-L4.   Soft tissues: No T2 hyperintense inflammatory changes.   
Posterior paraspinal soft tissues and muscles: No abnormality.    Lower thoracic
cord: Normal in signal and morphology. The tip of the conus is   at L1-L2.   
Cauda equina: No masses.  No arachnoiditis.      Vertebrae:    No fractures, 
infection or neoplasm.      Degenerative changes:      L1-L2:   Asymmetric to 
the right disc bulge results in mild canal stenosis and moderate   right and 
mild left neural foraminal narrowing.      L2-L3:   Asymmetric to the right disc
bulge results in moderate right and mild left   neural foraminal narrowing and 
mild canal stenosis.      L3-L4:   Asymmetric to the right disc bulge, mild 
bilateral facet arthropathy and   prominent posterior epidural fat result in m
oderate right neural foraminal   narrowing and moderate canal stenosis.    No 
left foraminal narrowing.      L4-L5:   Diffuse disc bulge with central disc 
protrusion and mild bilateral facet   arthropathy result in mild bilateral 
neural foraminal narrowing and severe   canal stenosis.      L5-S1:   Diffuse 
disc bulge and mild bilateral facet arthropathy result in moderate left   neural
foraminal narrowing.    No right foraminal or canal stenosis.      IMPRESSION:  
    1.  Degenerative changes at L2-L5 S1 with severe canal stenosis at L4-L5,   
moderate canal stenosis at L3-L4 and mild canal stenosis at L1-L2 and L2-L3.   
2.  Moderate right foraminal narrowing from L1-L2 through L3-L4 and moderate   
left foraminal narrowing at L5-S1.   3.  Mild leftward curvature centered at L3-
L4.      Signed by: Dr. Nimo Vergara M.D. on 2017 3:59 PM        
Dictated By: NIMO KUNZ MD  Electronically Signed By: NIMO KUNZ MD on 17  Transcribed By: SUSY on 17   
   COPY TO:   PANDA MARSHALL DO             

 

                CHEST 2 VIEWS                                        [place] [address]      Patient Name: SOLO SANCHEZ   MR #: 
[id number]    : 1940 Age/Sex: 77/F  Acct #: [id number] Req #: [id number]
[id number]  Adm Physician:     Ordered by: JO ANN DUARTE MD  Report #: [id number]
  Location: ER  Room/Bed:     
_______________________________________________________________________________
____________________    Procedure: [id number] DX/CHEST 2 VIEWS  Exam Date: 
17                            Exam Time: 1510       REPORT STATUS: Signed 
     EXAMINATION: PA and lateral views of the chest.      COMPARISON: None      
CLINICAL HISTORY:  Fall           DISCUSSION:      Lines/tubes:  None.      
Lungs:  The lungs are well inflated and clear. There is no evidence of   
pneumonia or pulmonary edema.      Pleura:  There is no pleural effusion or 
pneumothorax.      Heart and mediastinum:  The cardiomediastinal silhouette is 
normal.      Bones and soft tissues:  No acute bony abnormalities.        
IMPRESSION:    No acute cardiopulmonary abnormalities.                  Signed 
by: Dr. Andrew Palisch, M.D. on 2017 3:28 PM        Dictated By: ANDREW R PALISCH MD  Electronically Signed By: ANDREW R PALISCH MD on 17  
Transcribed By: SUSY on 17       COPY TO:   JO ANN DUARTE MD    
                                         

 

                SP LUMBAR AP   LATERAL 2-3VWS                                        [place] [address]      Patient Name: 
SOLO SANCHEZ   MR #: [id number]    : 1940 Age/Sex: 77/F  Acct #: 
[id number] Req #: [id number]  Adm Physician:     Ordered by: JO ANN DUARTE MD  Report #: [id number]   Location: ER  Room/Bed:     
_______________________________________________________________________________
____________________    Procedure: [id number] DX/SP LUMBAR AP   LATERAL 2-3VWS  
Exam Date: 17                            Exam Time: 1256       REPORT 
STATUS: Signed       Exam:  Lumbar spine 2 view      History:  Back pain      
Comparison: None.      Findings: No fracture. Leftward lumbar curvature. 
Multilevel disc space   narrowing most advanced at L3 and L4 at the concave 
aspect on the right. Lower   lumbar facet arthropathy. No abnormal soft tissue 
calcification or soft tissue   defect.        Impression:      Advanced lumbar 
spondylosis with severe degenerative disc disease L3-L4 and   lower lumbar facet
arthropathy.         Signed by: Dr. Andrew Palisch, M.D. on 2017 2:05 PM  
     Dictated By: ANDREW R PALISCH MD  Electronically Signed By: ANDREW R PALISCH MD on 17  Transcribed By: SUSY on 17       COPY
TO:   JO ANN DUARTE MD

## 2019-04-22 ENCOUNTER — HOSPITAL ENCOUNTER (OUTPATIENT)
Dept: HOSPITAL 88 - ENDO | Age: 79
Discharge: HOME | End: 2019-04-22
Attending: INTERNAL MEDICINE
Payer: MEDICARE

## 2019-04-22 VITALS — DIASTOLIC BLOOD PRESSURE: 82 MMHG | SYSTOLIC BLOOD PRESSURE: 135 MMHG

## 2019-04-22 DIAGNOSIS — I10: ICD-10-CM
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
## 2019-04-22 DIAGNOSIS — K29.70: Primary | ICD-10-CM

## 2019-04-22 DIAGNOSIS — Z96.649: ICD-10-CM

## 2019-04-22 DIAGNOSIS — K21.0: ICD-10-CM

## 2019-04-22 DIAGNOSIS — K31.7: ICD-10-CM

## 2019-04-22 DIAGNOSIS — E78.6: ICD-10-CM

## 2019-04-22 DIAGNOSIS — K44.9: ICD-10-CM

## 2019-04-22 DIAGNOSIS — T18.2XXA: ICD-10-CM

## 2019-04-22 PROCEDURE — 88305 TISSUE EXAM BY PATHOLOGIST: CPT

## 2019-04-22 PROCEDURE — 88312 SPECIAL STAINS GROUP 1: CPT

## 2019-04-22 PROCEDURE — 43239 EGD BIOPSY SINGLE/MULTIPLE: CPT

## 2019-04-22 NOTE — XMS REPORT
Summary of Care: 4/15/19 - 19

                             Created on: 2078



SOLO SANCHEZ

External Reference #: [id number]

: 1940

Sex: Female



Demographics







                          Address                   [address] [place] [address]  [id number]

 

                          Home Phone                [phone]

 

                          Preferred Language        English

 

                          Marital Status            

 

                          [employer] Affiliation     [employer]

 

                          Race                      Other

 

                                        Additional Race(s)  

 

                          Ethnic Group              Non-





Author







                          Author                    [facility]

 

                          Organization              [facility]

 

                          Address                   Unknown

 

                          Phone                     Unavailable







Encounter





HQ Encntr_alias(FIN) [id number] Date(s): 4/15/19 - 19

[facility] [address] [place] [address]-     
[phone]





Vital Signs





No data available for this section



Problem List





No data available for this section



Allergies, Adverse Reactions, Alerts





No data available for this section



Medications





No data available for this section



Results





No data available for this section



Immunizations





No data available for this section



Procedures





No data available for this section



Social History





No data available for this section



Assessment and Plan





No data available for this section

## 2019-04-22 NOTE — XMS REPORT
Continuity of Care Document

                             Created on: 2019



SOLO SANCHEZ

External Reference #: [id number]

: 1940

Sex: Female



Demographics







                          Address                   [address] [place] [address]  [address]

 

                          Home Phone                [phone]

 

                          Preferred Language        Unknown

 

                          Marital Status            Unknown

 

                          Cheondoism Affiliation     Unknown

 

                          Race                      Unknown

 

                          Ethnic Group              Unknown





Author







                          Author                    [facility]              Interface

 

                          Address                   Unknown

 

                          Phone                     Unavailable



                                                    



Problems

                    





                    Problem                            Status                            Onset Date     

                          Classification                            Date Reported       

                          Comments                            Source                    

 

                          Z12.31 - ENCNTR SCREEN MAMMOGRAM FOR MA                            Active         

                    2017                                                          

                                                      [facility]               

     

 

                    SOB                            Active                            2014         

                                                                                        

                                         [place]                    

 

                          Allergy, unspecified not elsewhere classified                            Active   

                                                      Problem                         

                    2015                                                        León Blas    

                

 

                    Inflammatory Arthritis                            Active                            

                            Diagnosis                            2015          

                                                      León Blas                    

 

                    Lumbar Radiculopathy                            Active                              

                          Problem                            2015              

                                                      León Blas                    

 

                          Polyarthritis, multiple sites                            Active                   

                                                Problem                            2015     

                                                      León Blas                    



 

                          Unspecified drug dependence                            Active                     

                                                Diagnosis                            2015     

                                                      León Blas                    



 

                    Abnormal SUMIT                            Active                                      

                          Problem                            2015                      

                                                      León Blas                    



                                                                                
                                                                                
                                      



Medications

                    





                    Medication                            Details                            Route      

                          Status                            Patient Instructions         

                          Ordering Provider                            Order Date           

                                        Source                    

 

                    Meloxicam                            1 tablet                            Orally     

                          No Longer Active                            7.5 MG Orally bid 

                           Nelsy                            2014              

                                        León Blas                    

 

                          Hydrocodone-Acetaminophen                            1 tablet as needed           

                          Orally                            No Longer Active                  

                          2.5-500 MG Orally every 6 hrs                            Nelsy            

                          2014                            León Blas                 

   

 

                    Gabapentin                            1 -2 capsule                            Orally

                            Active                            300 MG Orally at bedtime

                            Blas                            2014             

                                        León Blas                    

 

                          Hydrocodone-Acetaminophen                            1 tablet as needed           

                    Orally                            Active                            5-

325 MG Orally every 12 hours                            Blas                     

                          2014                            León Blas                    

 

                          Hydrocodone-Acetaminophen                            1 tablet as needed           

                    Orally                            Active                            5-

325 MG Orally every 12 hours                            Guillen                       

                          2014                            León Blas                    

 

                    Naproxen                            1 tablet as needed                            Orally

                            Active                            500 MG Orally every 12 

hrs                            Nelsy                                                

                                        León Blas                    

 

                    Zetia                            1 tablet                            Orally         

                          Active                            10 MG Orally Once a day         

                    Nelsy                                                        León Blas                    

 

                    Duloxetine HCl                            1 capsule                            Orally

                            Active                            30 MG Orally qd        

                    Nelsy                                                        [facility]ón Blas                    

 

                    Aspirin                            1 tablet                            Orally       

                          Active                            81 MG Orally Once a day       

                     Nelsy                                                        León Blas                    

 

                    Tramadol                            one tab                            orally       

                          Active                            50 mg orally every 4-6 hours prn

 pain                            Nelsy                                              

                                        León Blas                    

 

                          Losartan Potassium-HCTZ                            1 tablet                       

                    Orally                            Active                            50-12.5 MG Orally

 Once a day                            Nelsy                                        

                                        León Blas                    

 

                    Pravastatin Sodium                            1 tablet                            Orally

                            Active                            80 MG Orally Once a day

                            Nelsy                                                   

                                        [facility] Blas                    

 

                    Gabapentin                            2 caps                            NA          

                          Active                            300 mg qd pm                     

                    Nelsy                                                        [facility]    

                

 

                    Alendronate Sodium                            1 tablet                            Orally

                            Active                            70 MG Orally           

                    Mindy Blas

                    

 

                    Ranitidine 75                            as directed                            Orally

                            Active                            75 MG Orally three times

 a day                            Mindy Blas                    

 

                    Cetirizine HCl                            1 tablet                            Orally

                            Active                            10 MG Orally Twice a day

                            Mindy Blas                    



                                                                                
                                                                                
                                                                                
                                                                                
    



Allergies, Adverse Reactions, Alerts

                    





                    Substance                            Category                            Reaction   

                          Severity                            Reaction type           

                          Status                            Date Reported                     

                          Comments                            Source                    

 

                    N.K.D.A.                            Adverse Reaction                            Info

 Not Available                                                        Adverse Reaction

                            Active                            2014             

                                                      [facility]                    



                                                                        



Immunizations

                    





                    Immunization                            Date Given                            Site  

                          Status                            Last Updated             

                          Comments                            Source                    



                                                                        



Results

                    





                    Order Name                            Results                            Value      

                          Reference Range                            Date                

                          Interpretation                            Comments                       

                                        Source                    

 

                          Breast Mammo Diag UNI incl CAD MA                            Breast Mammo Diag UNI

 incl CAD MA                             - BREAST MAMMO DIAG UNI INCL CAD MA/R

UNILATERAL RIGHT DIGITAL DIAGNOSTIC MAMMOGRAM WITH CAD: 2017

CLINICAL: Other Abnormal And Inconclusive Findings On Diagnostic Imaging Of 
Breast/R92.8.  



Current study was evaluated with a Computer Aided Detection (CAD) system.  

Comparison is made to exams dated:  2017 mammogram, 12/10/2014 mammogram, 
2013 mammogram, 2012 mammogram - [facility], 2011 
mammogram and 2009 mammogram - [facility].  

There are scattered fibroglandular densities in the right breast.  

There is a group of lucent-centered calcifications in the right breast at 1 
o'clock middle depth 8 cm from the nipple.  This is not significantly changed in
overall distribution since .  

No other significant masses or calcifications are seen in the breast.  



IMPRESSION: BENIGN

The grouped lucent-centered calcifications in the right breast appear benign.  

There is no mammographic evidence of malignancy.  A 1 year screening mammogram 
is recommended. 



Professional services are provided by the [facility].



Timbo Sebastian M.D.          

cm/:2017 13:09:40  



Imaging Technologist: Jennie BECERRA(R)(BRUNO), [facility]

This exam was dictated and interpreted by [id number] for \[facility]\""[place].  

letter sent: Normal exam  

Mammogram BI-RADS: 2 Benign

                                                          2017                 

                                                      -

                                        -





Read by:  Juaquin Swenson MD

Dictated Date/time:  17 13:09

Electronically Signed by:  Juaquin Swenson MD                      17 
13:09

FINAL REPORT

                                        DAMON Morales                    

 

                          Breast Mammo Scrn TIFFANI incl CAD MA                            Breast Mammo Scrn TIFFANI

 incl CAD MA                             - BREAST MAMMO SCRN TIFFANI INCL CAD MA

BILATERAL DIGITAL SCREENING MAMMOGRAM WITH CAD: 2017

CLINICAL: Screening/Z12.31.  



Current study was evaluated with a Computer Aided Detection (CAD) system.  

Comparison is made to exams dated:  12/10/2014 mammogram, 2013 mammogram 
and 2012 mammogram - [facility].  

There are scattered fibroglandular densities in both breasts.  

There is a cluster of calcifications in the right breast at 1 o'clock middle 
depth 8 cm from the nipple.  

No other significant masses, calcifications, or other findings are seen in 
either breast.  



IMPRESSION: INCOMPLETE: NEEDS ADDITIONAL IMAGING EVALUATION

The cluster of calcifications in the right breast is indeterminate.  Spot 
magnification views are recommended.  





Professional services are provided by the [facility] 
Division of Diagnostic Imaging.



Essie Oates M.D.          

/penrad:2017 11:34:17  



Imaging Technologist: More Villanueva [facility]

This exam was dictated and interpreted by [id number] at [facility].  

letter sent: Additional Imaging  

Mammogram BI-RADS: 0 Indeterminate

                                                          2017                 

                                                      -

                                        -





Read by:  Essie Oates MD

Dictated Date/time:  17 11:34

Electronically Signed by:  Essie Oates MD                  17 
11:34

FINAL REPORT

                                        DAM[facility] Morales                    

 

                    Chest 2 views                            Chest 2 views                            Examination:

 Chest x-ray, 2 views

 

History: 714.0   Rheumatoid Arthritis

 

Comparison: 2014

 

Findings: The lungs are clear and without focal consolidation. The 
cardiomediastinal silhouette is within normal limits. No pleural effusion or 
pneumothorax is seen. The osseous structures are without focal abnormality. 

 

IMPRESSION: No acute cardiopulmonary disease. 

 

SL:  16

                                                          2015                 

                                                      -

                                        -





Read by:  Dave Hsu MD

Dictated Date/time:  01/06/15 15:35

Electronically Signed by:  Dave Hsu MD                         01/06/15 
15:35

FINAL REPORT

                                        [facility]                    



                                                                                
                                               



Vital Signs

                    





                    Vital Sign                            Value                            Date         

                          Comments                            Source                    

 

                    BMI Calculated                            35.23                             2015

                                                        [facility]                 

   

 

                    Weight                            81.818                             2015     

                                                      [facility]                    

 

                    Height                            152.4 cm                            2015    

                                                      [facility]                    

 

                    Weight                            179                             2014        

                                                      [facility]                    

 

                    Height                            61                             2014         

                                                      [facility]                    

 

                    Temperature Oral (F)                            98.2 F                            2014

                                                        [facility]               

     

 

                    Heart Rate                            76                             2014     

                                                      [facility]                    



 

                    Diastolic (mm Hg)                            84                             2014

                                                        [facility]               

     

 

                    Systolic (mm Hg)                            124                             2014

                                                        [facility]               

     

 

                    Weight                            183                             2014        

                                                      [facility]                    

 

                    Height                            61                             2014         

                                                      [facility]                    

 

                    Weight                            182                             2014        

                                                      [facility]                    

 

                    Height                            61                             2014         

                                                      [facility]                    

 

                    Temperature Oral (F)                            98.4 F                            2014

                                                        [facility]               

     

 

                    Heart Rate                            80                             2014     

                                                      [facility]                    



 

                    Diastolic (mm Hg)                            80                             2014

                                                        [facility]               

     

 

                    Systolic (mm Hg)                            130                             2014

                                                        [facility]               

     



                                                                                
                                                                                
                                                                                
                                                                                
                    



Encounters

                    





                    Location                            Location Details                            Encounter

 Type                            Encounter Number                            Reason For

 Visit                            Attending Provider                            ADM Date

                            DC Date                            Status                

                                        Source                    

 

                    Braden Blas MD                                                        [facility]

                                        [id number]                   

                                                                              2014                                                        [facility]ón Blas MD                                                        [facility]

                                        [id number]                   

                                                                              2014                                                        León Blas MD                                                        [facility]

                                        [id number]                   

                                                                              2014                                                        León Blas MD                                                        [facility]

                                        [id number]                   

                                                                              2014                                                        León Blas MD                                                        [facility]

                                        [id number]                   

                                                                              2014                                                        León Blas MD                                                        [facility]

                                        [id number]                   

                                                                              2014                                                        León Blas MD                                                        [facility]

                                        [id number]                   

                                                                              2014                                                        León Blas MD                                                        [facility]

                                        [id number]                   

                                                                              2014                                                        León Blas MD                                                        [facility]

                                        [id number]                   

                                                                              2014                                                        León Blas MD                                                        reschedule

 f/u                                    [id number]               

                                                                              2014                                                     

                                        León Blas MD                                                        reschedule

 f/u                                    [id number]               

                                                                              2014                                                     

                                        León Blas MD                                                        reschedule

 f/u                                    [id number]               

                                                                              2014                                                     

                                        León Blas MD                                                        reschedule

 f/u                                    [id number]               

                                                                              2014                                                     

                                        León Blas MD                                                        reschedule

 f/u                                    [id number]               

                                                                              2014                                                     

                                        León Blas MD                                                        reschedule

 f/u                                    [id number]               

                                                                              2014                                                     

                                        León Blas MD                                                        reschedule

 f/u                                    [id number]               

                                                                              2014                                                     

                                        León Blas MD                                                        reschedule

 f/u                                    [id number]               

                                                                              2014                                                     

                                        León Blas MD                                                        reschedule

 f/u                                    [id number]               

                                                                              2014                                                     

                                        León Blas MD                                                        reschedule

 f/u                                    [id number]               

                                                                              2014                                                     

                                        León Blas MD                                                        reschedule

 f/u                                    [id number]               

                                                                              2014                                                     

                                        León Blas MD                                                        [place]

                                        [id number]                   

                                                                              2014                                                        León Blas MD                                                        [place]

                                        [id number]                   

                                                                              2014                                                        León Blas MD                                                        [place]

                                        [id number]                   

                                                                              2014                                                        León Blas MD                                                        [place]

                                        [id number]                   

                                                                              2014                                                        León Blas MD                                                        [place]

                                        [id number]                   

                                                                              2014                                                        León Blas MD                                                        [place]

                                        [id number]                   

                                                                              2014                                                        León Blas MD                                                        [place]

                                        [id number]                   

                                                                              2014                                                        León Blas MD                                                        [place]

                                        [id number]                   

                                                                              2014                                                        León Blas MD                                                        [place]

                                        [id number]                   

                                                                              2014                                                        León Blas MD                                                        [place]

                                        [id number]                   

                                                                              2014                                                        León Blas MD                                                        [place]

                                        [id number]                   

                                                                              2014                                                        León Blas MD                                                        [place]

                                        [id number]                   

                                                                              2014                                                        León Blas MD                                                        New order

 form For MRI                            [id number]      

                                                                                2014                                               

                                        León Blas MD                                                        New order

 form For MRI                            [id number]      

                                                                                2014                                               

                                        León Blas MD                                                        New order

 form For MRI                            [id number]      

                                                                                2014                                               

                                        León Blas MD                                                        New order

 form For MRI                            [id number]      

                                                                                2014                                               

                                        León Blas MD                                                        New order

 form For MRI                            [id number]      

                                                                                2014                                               

                                        León Blas MD                                                        New order

 form For MRI                            [id number]      

                                                                                2014                                               

                                        León Blas MD                                                        New order

 form For MRI                            [id number]      

                                                                                2014                                               

                                        León Blas MD                                                        New order

 form For MRI                            [id number]      

                                                                                2014                                               

                                        León Blas MD                                                        New order

 form For MRI                            [id number]      

                                                                                2014                                               

                                        León Blas MD                                                        New order

 form For MRI                            [id number]      

                                                                                2014                                               

                                        León Blas MD                                                        New order

 form For MRI                            [id number]      

                                                                                2014                                               

                                        León Blas MD                                                        New order

 form For MRI                            [id number]      

                                                                                2014                                               

                                        León Blas MD                                                        New order

 form For MRI                            [id number]      

                                                                                2014                                               

                                        León Blas MD                                                        Unknown 

                                        [id number]                    

                                                                              2014                                                        León Blas MD                                                        Unknown 

                                        [id number]                    

                                                                              2014                                                        León Blas MD                                                        Unknown 

                                        [id number]                    

                                                                              2014                                                        León Blas MD                                                        Unknown 

                                        [id number]                    

                                                                              2014                                                        León Blas MD                                                        Unknown 

                                        [id number]                    

                                                                              2014                                                        León Blas MD                                                        Unknown 

                                        [id number]                    

                                                                              2014                                                        León Blas MD                                                        Unknown 

                                        [id number]                    

                                                                              2014                                                        León Blas MD                                                        Unknown 

                                        [id number]                    

                                                                              2014                                                        León Blas MD                                                        Unknown 

                                        [id number]                    

                                                                              2014                                                        León Blas MD                                                        Referral

                                        [id number]                   

                                                                              2014                                                        León Blas MD                                                        Referral

                                        [id number]                   

                                                                              2014                                                        León Blas MD                                                        Referral

                                        [id number]                   

                                                                              2014                                                        León Blas MD                                                        Referral

                                        [id number]                   

                                                                              2014                                                        León Blas MD                                                        Referral

                                        [id number]                   

                                                                              2014                                                        León Blas MD                                                        Referral

                                        [id number]                   

                                                                              2014                                                        León Blas MD                                                        Referral

                                        [id number]                   

                                                                              2014                                                        León Blas MD                                                        Referral

                                        [id number]                   

                                                                              2014                                                        León Blas MD                                                        Referral

                                        [id number]                   

                                                                              2014                                                        León Blas MD                                                        Referral

                                        [id number]                   

                                                                              2014                                                        León Blas MD                                                        pt call 

                                        [id number]                    

                                                                              2014                                                        León Blas MD                                                        pt call 

                                        [id number]                    

                                                                              2014                                                        León Blas MD                                                        pt call 

                                        [id number]                    

                                                                              2014                                                        León Blas MD                                                        pt call 

                                        [id number]                    

                                                                              2014                                                        León Blas MD                                                        pt call 

                                        [id number]                    

                                                                              2014                                                        León Blas MD                                                        pt call 

                                        [id number]                    

                                                                              2014                                                        León Blas MD                                                        pt call 

                                        [id number]                    

                                                                              2014                                                        León Blas MD                                                        pt call 

                                        [id number]                    

                                                                              2014                                                        León Blas MD                                                        pt call 

                                        [id number]                    

                                                                              2014                                                        León Blas MD                                                        MRI     

                                        [id number]                        

                                                                              2014                                                        León Blas MD                                                        MRI     

                                        [id number]                        

                                                                              2014                                                        León Blas MD                                                        MRI     

                                        [id number]                        

                                                                              2014                                                        León Blas MD                                                        MRI     

                                        [id number]                        

                                                                              2014                                                        León Blas MD                                                        MRI     

                                        [id number]                        

                                                                              2014                                                        León Blas MD                                                        MRI     

                                        [id number]                        

                                                                              2014                                                        León Blas MD                                                        MRI     

                                        [id number]                        

                                                                              2014                                                        León Blas MD                                                        MRI     

                                        [id number]                        

                                                                              2014                                                        León Blas MD                                                        MRI     

                                        [id number]                        

                                                                              2014                                                        León Blas MD                                                        Pt back 

pain update                             [id number]        

                                                                              2014                                               

                                        León Blas MD                                                        Pt back 

pain update                             [id number]        

                                                                              2014                                               

                                        León Blas MD                                                        Pt back 

pain update                             [id number]        

                                                                              2014                                               

                                        León Blas MD                                                        Pt back 

pain update                             [id number]        

                                                                              2014                                               

                                        León Blas MD                                                        Pt back 

pain update                             [id number]        

                                                                              2014                                               

                                        León Blas MD                                                        Pt back 

pain update                             [id number]        

                                                                              2014                                               

                                        León Blas MD                                                        Pt back 

pain update                             [id number]        

                                                                              2014                                               

                                        León Blas MD                                                        Pt back 

pain update                             [id number]        

                                                                              2014                                               

                                        León Blas MD                                                        Pt back 

pain update                             [id number]        

                                                                              2014                                               

                                        León Blas MD                                                        Gabapentin

                                        [id number]                   

                                                                              2014                                                        León Blas MD                                                        Gabapentin

                                        [id number]                   

                                                                              2014                                                        León Blas MD                                                        Gabapentin

                                        [id number]                   

                                                                              2014                                                        León Blas MD                                                        Gabapentin

                                        [id number]                   

                                                                              2014                                                        León Blas MD                                                        Gabapentin

                                        [id number]                   

                                                                              2014                                                        León Blas MD                                                        Gabapentin

                                        [id number]                   

                                                                              2014                                                        León Blas MD                                                        Gabapentin

                                        [id number]                   

                                                                              2014                                                        León Blas MD                                                        Gabapentin

                                        [id number]                   

                                                                              2014                                                        León Blas MD                                                        Gabapentin

                                        [id number]                   

                                                                              2014                                                        León Blas MD                                                        refill hydrocodone

                                        [id number]                   

                                                                              2014                                                        León Blas MD                                                        refill hydrocodone

                                        [id number]                   

                                                                              2014                                                        León Blas MD                                                        refill hydrocodone

                                        [id number]                   

                                                                              2014                                                        León Blas MD                                                        refill hydrocodone

                                        [id number]                   

                                                                              2014                                                        León Blas MD                                                        refill hydrocodone

                                        [id number]                   

                                                                              2014                                                        León Blas MD                                                        refill hydrocodone

                                        [id number]                   

                                                                              2014                                                        León Blas MD                                                        refill hydrocodone

                                        [id number]                   

                                                                              2014                                                        León Blas MD                                                        refill hydrocodone

                                        [id number]                   

                                                                              2014                                                        León Blas MD                                                        refill hydrocodone

                                        [id number]                   

                                                                              2014                                                        León Blas MD                                                        No longer

 wants to be seen here                            [id number]

                                                                                      07/15/2014

                            07/15/2014                                               

                                        León Blas MD                                                        No longer

 wants to be seen here                            [id number]

                                                                                      07/15/2014

                            07/15/2014                                               

                                        León Blas                    

 

                          [facility]                                                

                          Outpt Diag Services                            [id number]                  

                                                Wes Sepulveda                             12/10/2014

                            2014                                               

                                         [facility]                    

 

                    Braden Blas MD                                                        F/U     

                                        [id number]                        

                                                                              2014                                                        León Blas                    

 

                          [facility]                                               

                    Outpatient                            [id number]                            

                            Wadannie Nelsy                             2015                                                   

                                        [facility] [place]                                                

                          Outpt Diag Services                            [phone]                  

                                                Simon Oneill                             2017                                               

                                         [facility]                    

 

                          [facility]                                                

                          Outpt Diag Services                            [id number]                  

                                                Simon Oneill                             2017                                               

                                         [facility]                    

 

                    [facility]                                                        Phone

 Message                            [phone]                                     

                                                  2019                                                        [facility]      

              

 

                    [facility]                                                        Phone

 Message                            [phone]                                     

                                                  04/15/2019                            

2019                                                        [facility]      

              

 

                                                                            Outpatient                  

                    [id number]                                                        Mary Ann Lai

                             2019                                              

                          Active                            [facility]                    



                                                                                
                                                                                
                                                                                
                                                                                
                                                                                
                                                                                
                                                                                
                                                                                
                                                                                
                                                                                
                                                                                
                                                                                
                                                                                
                                                                                
                                                                                
                                                                                
                                                                                
                                                                                
                                          



Procedures

                    





                    Procedure                            Code                            Date           

                          Perfomer                            Comments                        

                                        Source

## 2019-04-22 NOTE — XMS REPORT
Summary of Care: 19 - 19

                             Created on: 2070



SOLO SANCHEZ

External Reference #: [id number]

: 1940

Sex: Female



Demographics







                          Address                   [address] [place] [address]  [address]

 

                          Home Phone                [phone]

 

                          Preferred Language        English

 

                          Marital Status            

 

                          [employer] Affiliation     [employer]

 

                          Race                      Other

 

                                        Additional Race(s)  

 

                          Ethnic Group              Non-





Author







                          Author                    [facility]

 

                          Organization              [facility]

 

                          Address                   Unknown

 

                          Phone                     Unavailable







Encounter





HQ Encntr_alias(FIN) [id number] Date(s): 19 - 19

[facility] [address] [address] [place] [address]-     
[phone]





Vital Signs





No data available for this section



Problem List





No data available for this section



Allergies, Adverse Reactions, Alerts





No data available for this section



Medications





No data available for this section



Results





No data available for this section



Immunizations





No data available for this section



Procedures





No data available for this section



Social History





No data available for this section



Assessment and Plan





No data available for this section

## 2019-04-22 NOTE — OPERATIVE REPORT
DATE OF PROCEDURE:  04/22/2019

 

SURGEON:  Ezequiel Wright MD

 

PROCEDURE:  Esophagogastroduodenoscopy with biopsies.

 

INDICATION FOR EGD:  Upper abdominal pain, history of recently described penetrating

gastric ulcer per CT scan. 

 

MEDICATIONS:  The patient was done under MAC, please see anesthesiologist's note.

 

PROCEDURE IN DETAIL:  With the patient in left lateral decubitus position, flexible

fiberoptic Olympus gastroscope was introduced into the esophagus under direct

visualization without any difficulty.  There was some patchy erythema noted in distal

esophagus.  A minute nodule was noted at the GE junction that was biopsied.  The scope

was then advanced with ease into the stomach traversing a moderate-sized hiatal hernia.

Mucosa overlying the antrum and the body revealed some patchy erythema and

mild-to-moderate edema, biopsies were obtained and sent to stain for H pylori.  Some

suture material was noted to overlie the well healed previously described gastric ulcer.

 Pylorus was of normal contour and shape, it was intubated with ease and the scope was

advanced all the way to the second portion of the duodenum.  The scope was then

withdrawn slowly, mucosa overlying the proximal second portion and the duodenal bulb

appeared to be within normal limits.  The scope was then withdrawn back into the stomach

and retroflexed and mucosa overlying the fundus as well as that of the cardia appeared

to be within normal limits.  The previously described hiatal hernia was also noted in

the retroflexed position.  The scope was then straightened out, it was subsequently

withdrawn.  The patient tolerated the procedure well. 

 

IMPRESSION:  

1. Mild distal esophagitis.

2. Minute nodule, GE junction biopsied.

3. Moderate-sized hiatal hernia.

4. Gastritis, biopsied.  Biopsies sent to stain for Helicobacter pylori.

5. Suture material overlying well healed gastric ulcer, antrum.

 

PLAN:  Follow up histology.  Continue Protonix 40 mg one p.o. a.c. b.i.d.

 

 

 

 

______________________________

Ezequiel Wright MD

 

[facility]

D:  04/22/2019 14:48:57

T:  04/22/2019 16:46:13

Job #:  [id number]

 

cc:            Damaso Oneill DO

## 2019-12-19 NOTE — PROCEDURE: LIQUID NITROGEN
Subjective:       Patient ID: Corazon Jones is a 56 y.o. male.    Chief Complaint: Abnormal Ct Scan    HPI:   Corazon Jones is a 56 y.o. male who presents with abnormal CT scan. On 11/15/2019, patient had routine calcium scoring CT which had incidental finding of few, focal soft tissue opacities in the anterior segment of the left upper lobe measuring up to 0.8 cm. He was referred to pulmonary services by PCP for further evaluation. He explains asymptomatic from pulmonary standpoint.  He denies cough, fever, chills, hemoptysis or weight loss.  Discloses years ago was told of having hilar lymphadenopathy after routine pre employment CXR- at that time he was treated for TB. However, he explains his cultures came back negative for tuberculosis and dx with sarcoidosis. States having no treatment for sarcoid- resolved on own. States having no reoccurrence. Denies smoking hx. Denies cough, wheezing, or shortness of breath. Tells he was born in [place] and came to the [place] in his 20's.     Review of Systems   Constitutional: Negative for fever, chills, weight loss, fatigue and night sweats.   HENT: Negative for postnasal drip, rhinorrhea and congestion.    Respiratory: Negative for cough, hemoptysis, sputum production, chest tightness, shortness of breath, wheezing and dyspnea on extertion.    Cardiovascular: Negative for chest pain and leg swelling.   Musculoskeletal: Negative for joint swelling.   Skin: Negative for rash.   Gastrointestinal: Negative for acid reflux.   Neurological: Negative for headaches.   Hematological: Negative for adenopathy.   Psychiatric/Behavioral: Negative for sleep disturbance.       Social History     Tobacco Use    Smoking status: Never Smoker    Smokeless tobacco: Never Used   Substance Use Topics    Alcohol use: No     Review of patient's allergies indicates:   Allergen Reactions    Percocet [oxycodone-acetaminophen]     Suture, silk     Adhesive Rash     Past Medical History: 
"  Diagnosis Date    History of Clostridium difficile colitis     treated 2014    History of sarcoidosis     dx 1991, resolved    Hyperlipemia      Past Surgical History:   Procedure Laterality Date    APPENDECTOMY      HERNIA REPAIR      orthoscopy      shoulder and knee     Current Outpatient Medications on File Prior to Visit   Medication Sig    celecoxib (CELEBREX) 200 MG capsule Take 1 capsule (200 mg total) by mouth once daily.    diazePAM (VALIUM) 5 MG tablet  take 1 tablet 1 hour prior to study, may repeat if necessary 1 hour prior for 1 dose.    fluticasone (FLONASE) 50 mcg/actuation nasal spray 2 sprays (100 mcg total) by Each Nare route once daily.    rosuvastatin (CRESTOR) 40 MG Tab Take 1 tablet (40 mg total) by mouth every evening.    sertraline (ZOLOFT) 50 MG tablet Take 1 tablet (50 mg total) by mouth once daily.    zolpidem (AMBIEN) 5 MG Tab Take 1 tablet (5 mg total) by mouth nightly as needed. (Patient not taking: Reported on 12/19/2019)     No current facility-administered medications on file prior to visit.      Objective:      Vitals:    12/19/19 1303   BP: 124/88   BP Location: Left arm   Patient Position: Sitting   Pulse: 83   SpO2: 95%   Weight: 107.6 kg (237 lb 3.4 oz)   Height: 5' 10" (1.778 m)     Physical Exam   Constitutional: He is oriented to person, place, and time. He appears well-developed and well-nourished. No distress.   HENT:   Head: Normocephalic.   Neck: Normal range of motion. Neck supple.   Cardiovascular: Normal rate, regular rhythm and normal heart sounds.   Pulmonary/Chest: Normal expansion, effort normal and breath sounds normal. No respiratory distress. He has no wheezes. He has no rhonchi.   Abdominal: Soft. Bowel sounds are normal. There is no tenderness.   Musculoskeletal: Normal range of motion. He exhibits no edema.   Lymphadenopathy: No supraclavicular adenopathy is present.     He has no cervical adenopathy.   Neurological: He is alert and oriented to "
person, place, and time. Gait normal.   Skin: Skin is warm and dry. Nails show no clubbing.   Psychiatric: He has a normal mood and affect. His behavior is normal.   Vitals reviewed.    Personal Diagnostic Review    CT Cardiac Scoring 11/15/2019-  Few, focal soft tissue opacities in the anterior segment of the left upper lobe measuring up to 0.8 cm (axial series 302, image 6; image 8; image 10).  Etiology of these soft tissue opacities is unclear, with considerations including mucoid impaction in ectatic airways infection, non infectious inflammation, postinflammatory change, with neoplasm unable be completely excluded.      Assessment:     Problem List Items Addressed This Visit        Pulmonary    Lung nodule    Current Assessment & Plan     Mr. Jones presents to clinic with incidental finding of left upper lobe lung nodule -0.8cm. He reports asymptomatic from pulmonary standpoint. Reports hx of sarcoidosis- no reoccurring issue in years. 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 Was able to review previous CTA of chest from 2005 for comparison to cardiac scoring CT. The CTA from 2005 suggested the presence of left upper lobe nodules at that time which appears to be similar in size. It is reasonable to consider no further imaging, as patient is low risk. An alternative conservative approach is repeat non contrast CT in 1 year.              Follow up as needed.       
Post-Care Instructions: I reviewed with the patient in detail post-care instructions. Patient is to wear sunprotection, and avoid picking at any of the treated lesions. Pt may apply Vaseline to crusted or scabbing areas.
Consent: The patient's verbal consent was obtained including but not limited to risks of crusting, scabbing, blistering, scarring, darker or lighter pigmentary change, recurrence, incomplete removal and infection.
Duration Of Freeze Thaw-Cycle (Seconds): 0
Detail Level: Detailed
Render Post-Care Instructions In Note?: yes

## 2020-11-15 ENCOUNTER — HOSPITAL ENCOUNTER (EMERGENCY)
Dept: HOSPITAL 88 - ER | Age: 80
Discharge: OTHER INSTITUTION | End: 2020-11-15
Payer: MEDICARE

## 2020-11-15 VITALS — SYSTOLIC BLOOD PRESSURE: 134 MMHG | DIASTOLIC BLOOD PRESSURE: 67 MMHG

## 2020-11-15 VITALS — WEIGHT: 180 LBS | BODY MASS INDEX: 33.99 KG/M2 | HEIGHT: 61 IN

## 2020-11-15 DIAGNOSIS — K76.9: ICD-10-CM

## 2020-11-15 DIAGNOSIS — R74.01: ICD-10-CM

## 2020-11-15 DIAGNOSIS — R55: Primary | ICD-10-CM

## 2020-11-15 LAB
ALBUMIN SERPL-MCNC: 3.9 G/DL (ref 3.5–5)
ALBUMIN/GLOB SERPL: 0.9 {RATIO} (ref 0.8–2)
ALP SERPL-CCNC: 692 IU/L (ref 40–150)
ALT SERPL-CCNC: 206 IU/L (ref 0–55)
ANION GAP SERPL CALC-SCNC: 21 MMOL/L (ref 8–16)
BACTERIA URNS QL MICRO: (no result) /HPF
BASOPHILS # BLD AUTO: 0.1 10*3/UL (ref 0–0.1)
BASOPHILS NFR BLD AUTO: 0.8 % (ref 0–1)
BILIRUB UR QL: NEGATIVE
BNP BLD-MCNC: 93.4 PG/ML (ref 0–100)
BUN SERPL-MCNC: 17 MG/DL (ref 7–26)
BUN/CREAT SERPL: 14 (ref 6–25)
CALCIUM SERPL-MCNC: 10.2 MG/DL (ref 8.4–10.2)
CHLORIDE SERPL-SCNC: 104 MMOL/L (ref 98–107)
CK MB SERPL-MCNC: 3.1 NG/ML (ref 0–5)
CK SERPL-CCNC: 73 IU/L (ref 29–168)
CLARITY UR: CLEAR
CO2 SERPL-SCNC: 21 MMOL/L (ref 22–29)
COLOR UR: YELLOW
DEPRECATED NEUTROPHILS # BLD AUTO: 7.1 10*3/UL (ref 2.1–6.9)
DEPRECATED RBC URNS MANUAL-ACNC: (no result) /HPF (ref 0–5)
EGFRCR SERPLBLD CKD-EPI 2021: 44 ML/MIN (ref 60–?)
EOSINOPHIL # BLD AUTO: 2.2 10*3/UL (ref 0–0.4)
EOSINOPHIL NFR BLD AUTO: 19.1 % (ref 0–6)
EPI CELLS URNS QL MICRO: (no result) /LPF
ERYTHROCYTE [DISTWIDTH] IN CORD BLOOD: 14.6 % (ref 11.7–14.4)
GLOBULIN PLAS-MCNC: 4.5 G/DL (ref 2.3–3.5)
GLUCOSE SERPLBLD-MCNC: 93 MG/DL (ref 74–118)
HCT VFR BLD AUTO: 40.1 % (ref 34.2–44.1)
HGB BLD-MCNC: 12.3 G/DL (ref 12–16)
KETONES UR QL STRIP.AUTO: NEGATIVE
LEUKOCYTE ESTERASE UR QL STRIP.AUTO: NEGATIVE
LYMPHOCYTES # BLD: 1.1 10*3/UL (ref 1–3.2)
LYMPHOCYTES NFR BLD AUTO: 9.6 % (ref 18–39.1)
MCH RBC QN AUTO: 26.6 PG (ref 28–32)
MCHC RBC AUTO-ENTMCNC: 30.7 G/DL (ref 31–35)
MCV RBC AUTO: 86.8 FL (ref 81–99)
MONOCYTES # BLD AUTO: 0.7 10*3/UL (ref 0.2–0.8)
MONOCYTES NFR BLD AUTO: 6.6 % (ref 4.4–11.3)
MUCOUS THREADS URNS QL MICRO: (no result)
NEUTS SEG NFR BLD AUTO: 63.5 % (ref 38.7–80)
NITRITE UR QL STRIP.AUTO: NEGATIVE
PLATELET # BLD AUTO: 416 X10E3/UL (ref 140–360)
POTASSIUM SERPL-SCNC: 5 MMOL/L (ref 3.5–5.1)
PROT UR QL STRIP.AUTO: NEGATIVE
RBC # BLD AUTO: 4.62 X10E6/UL (ref 3.6–5.1)
SODIUM SERPL-SCNC: 141 MMOL/L (ref 136–145)
SP GR UR STRIP: 1.02 (ref 1.01–1.02)
UROBILINOGEN UR STRIP-MCNC: 0.2 MG/DL (ref 0.2–1)
WBC #/AREA URNS HPF: (no result) /HPF (ref 0–5)

## 2020-11-15 PROCEDURE — 87086 URINE CULTURE/COLONY COUNT: CPT

## 2020-11-15 PROCEDURE — 74177 CT ABD & PELVIS W/CONTRAST: CPT

## 2020-11-15 PROCEDURE — 81001 URINALYSIS AUTO W/SCOPE: CPT

## 2020-11-15 PROCEDURE — 70450 CT HEAD/BRAIN W/O DYE: CPT

## 2020-11-15 PROCEDURE — 83735 ASSAY OF MAGNESIUM: CPT

## 2020-11-15 PROCEDURE — 72125 CT NECK SPINE W/O DYE: CPT

## 2020-11-15 PROCEDURE — 83880 ASSAY OF NATRIURETIC PEPTIDE: CPT

## 2020-11-15 PROCEDURE — 84484 ASSAY OF TROPONIN QUANT: CPT

## 2020-11-15 PROCEDURE — 82553 CREATINE MB FRACTION: CPT

## 2020-11-15 PROCEDURE — 99284 EMERGENCY DEPT VISIT MOD MDM: CPT

## 2020-11-15 PROCEDURE — 36415 COLL VENOUS BLD VENIPUNCTURE: CPT

## 2020-11-15 PROCEDURE — 85025 COMPLETE CBC W/AUTO DIFF WBC: CPT

## 2020-11-15 PROCEDURE — 93005 ELECTROCARDIOGRAM TRACING: CPT

## 2020-11-15 PROCEDURE — 73030 X-RAY EXAM OF SHOULDER: CPT

## 2020-11-15 PROCEDURE — 51700 IRRIGATION OF BLADDER: CPT

## 2020-11-15 PROCEDURE — 82550 ASSAY OF CK (CPK): CPT

## 2020-11-15 PROCEDURE — 80053 COMPREHEN METABOLIC PANEL: CPT

## 2020-11-15 PROCEDURE — 71045 X-RAY EXAM CHEST 1 VIEW: CPT

## 2020-11-15 PROCEDURE — 83605 ASSAY OF LACTIC ACID: CPT

## 2020-11-15 NOTE — NUR
PATIENT MULTIPLE TIMES HAS BEEN REORIENTATED TO KEEP HER ARM STRAIGHT SO IV 
FLUIDS CAN BE GIVEN TO HER, PATIENT HAS REFUSED TO KEEP ARM STRAIGHT. INFORMED 
OF RISKS AND DELAYS OF CARE IN REFUSAL. PATIENT ASSUMES RISKS. DR BIRD 
INFORMED. PATIENT IS ALERT X3, MAKING NEEDS KNOWN. SHE AT THIS MOMENT HAS 
AGREED TO KEEP HER ARM STRAIGHT HOWEVER, ROLLED HER EYES AT THIS NURSE.

## 2020-11-15 NOTE — EMERGENCY DEPARTMENT NOTE
History of Present Illnes


History of Present Illness


Chief Complaint:  General Medicine Complaints


History of Present Illness


This is a 80 year old  female PATIENT BROUGHT FROM INDEPENDENT LIVING 

([facility]). HER NEIGHBORS HEARD HER YELLING AND CALLED 911. SHE 

WAS FOUND ON FLOOR. PATIENT ARRIVED ALERT/ORIENTED X 3 BUT SLOW TO RESPOND TO 

QUESTIONS/CONFUSED. SAYS SHE WOKE ON FLOOR AND DOESN'T KNOW WHAT HAPPENED, 

THINKS SHE FELL/PASSED OUT.


Historian:  Paramedic/EMS


Arrival Mode:  [place]


Additional Treatment PTA:  NONE


 Required:  No


Onset (how long ago):  hour(s)


Location:  NO PAIN


Radiation:  Reports non-radiation


Severity:  unable to specify


Onset quality:  sudden


Timing of current episode:  sporadic


Chronicity:  new


Context:  Denies recent illness


Relieving factors:  none


Exacerbating factors:  none


Associated symptoms:  Reports denies other symptoms (ANNEMARIE FERRERA MD)





Past Medical/Family History


Physician Review


I have reviewed the patient's past medical and family history.  Any updates have

been documented here.


 (ANNEMARIE FERRERA MD)





Past Medical History


Recent Fever:  No


Clinical Suspicion of Infectio:  No


New/Unexplained Change in Ment:  No


Past Medical History:  Hypertension, Hyperlipedemia


Other Medical History:  


HIGH CHOLESTEROL





Sciatica





GASTRIC ULCER W/BLEED


Other Surgery:  


UNKNOWN


 (ANNEMARIE FERRERA MD)





Social History


Smoking Cessation:  Never Smoker


Counseling Performed:  No


Alcohol Use:  None


Any Illegal Drug Use:  No


TB Exposure/Symptoms:  No


Physically hurt or threatened:  No


 (ANNEMARIE FERRERA MD)





Family History


Family history of heart diseas:  No


 (ANNEMARIE FERRERA MD)





Other


Last Tetanus:  <5yrs


Any Pre-Existing Lines (PICC,:  No


 (ANNEMARIE FERRERA MD)





Review of Systems


Review of Systems


Constitutional:  Reports no symptoms


EENTM:  Reports no symptoms


Cardiovascular:  Reports syncope


Respiratory:  Reports no symptoms


Gastrointestinal:  Reports no symptoms


Genitourinary:  Reports no symptoms


Musculoskeletal:  Reports no symptoms


Integumentary:  Reports no symptoms


Neurological:  Reports as per HPI


Psychological:  Reports no symptoms


Endocrine:  Reports no symptoms


Hematological/Lymphatic:  Reports no symptoms (ANNEMARIE FERRERA MD)





Physical Exam


Related Data


Allergies:  


Coded Allergies:  


     No Known Allergies (Verified , 11/26/17)


Triage Vital Signs





Vital Signs








  Date Time  Temp Pulse Resp B/P (MAP) Pulse Ox O2 Delivery O2 Flow Rate FiO2


 


11/15/20 16:42 98.5 57 18 135/75 96 Room Air  








Vital signs reviewed:  Yes


 (ANNEMARIE FERRERA MD)





Physical Exam


CONSTITUTIONAL





Constitutional:  Present well-developed, Present well-nourished, Present obese


HENT


HENT:  Present normocephalic, Present atraumatic, Present oropharynx 

clear/moist, Present nose normal


HENT L/R:  Present left TM normal, Present right TM normal, Present left ext ear

normal, Present right ext ear normal


EYES





Eyes:  Reports PERRL, Reports conjunctivae normal


NECK


Neck:  Present ROM normal, Present supple, Present other (NO MIDLINE TTP)


PULMONARY


Pulmonary:  Present effort normal, Present other (DECR BS'S BILAT)


CARDIOVASCULAR





Cardiovascular:  Present regular rhythm, Present heart sounds normal, Present 

capillary refill normal, Present normal rate


GASTROINTESTINAL





Abdominal:  Present soft, Present nontender, Present bowel sounds normal


GENITOURINARY





Genitourinary:  Present exam deferred


SKIN


Skin:  Present warm, Present dry


MUSCULOSKELETAL





Musculoskeletal:  Present ROM normal


NEUROLOGICAL





Neurological:  Present alert, Present oriented x 3, Present no gross motor or 

sensory deficits, Present other (SLOW TO RESPOND TO Q'S, STARES OFF AT TIMES)


PSYCHOLOGICAL


Psychological:  Present mood/affect normal, Present judgement normal (ANNEMARIE MARSH MD)





Results


Laboratory


Result Diagram:  


11/15/20 1740                                                                   

            11/15/20 1740





Laboratory





Laboratory Tests








Test


 11/15/20


17:40 11/15/20


17:28


 


White Blood Count


 11.23 x10e3/uL


(4.8-10.8) 





 


Red Blood Count


 4.62 x10e6/uL


(3.6-5.1) 





 


Hemoglobin


 12.3 g/dL


(12.0-16.0) 





 


Hematocrit


 40.1 %


(34.2-44.1) 





 


Mean Corpuscular Volume


 86.8 fL


(81-99) 





 


Mean Corpuscular Hemoglobin


 26.6 pg


(28-32) 





 


Mean Corpuscular Hemoglobin


Concent 30.7 g/dL


(31-35) 





 


Red Cell Distribution Width


 14.6 %


(11.7-14.4) 





 


Platelet Count


 416 x10e3/uL


(140-360) 





 


Neutrophils (%) (Auto)


 63.5 %


(38.7-80.0) 





 


Lymphocytes (%) (Auto)


 9.6 %


(18.0-39.1) 





 


Monocytes (%) (Auto)


 6.6  %


(4.4-11.3) 





 


Eosinophils (%) (Auto)


 19.1 %


(0.0-6.0) 





 


Basophils (%) (Auto)


 0.8 %


(0.0-1.0) 





 


Neutrophils # (Auto) 7.1 (2.1-6.9)  


 


Lymphocytes # (Auto) 1.1 (1.0-3.2)  


 


Monocytes # (Auto) 0.7 (0.2-0.8)  


 


Eosinophils # (Auto) 2.2 (0.0-0.4)  


 


Basophils # (Auto) 0.1 (0.0-0.1)  


 


Absolute Immature Granulocyte


(auto 0.05 x10e3/uL


(0-0.1) 





 


Sodium Level


 141 mmol/L


(136-145) 





 


Potassium Level


 5.0 mmol/L


(3.5-5.1) 





 


Chloride Level


 104 mmol/L


() 





 


Carbon Dioxide Level


 21 mmol/L


(22-29) 





 


Anion Gap


 21.0 mmol/L


(8-16) 





 


Blood Urea Nitrogen


 17 mg/dL


(7-26) 





 


Creatinine


 1.19 mg/dL


(0.57-1.11) 





 


Estimat Glomerular Filtration


Rate 44 ML/MIN


(60-) 





 


BUN/Creatinine Ratio 14 (6-25)  


 


Glucose Level


 93 mg/dL


() 





 


Lactic Acid Level


 3.4 mmol/L


(0.5-2.0) 





 


Calcium Level


 10.2 mg/dL


(8.4-10.2) 





 


Magnesium Level


 1.8 MG/DL


(1.3-2.1) 





 


Total Bilirubin


 0.6 mg/dL


(0.2-1.2) 





 


Aspartate Amino Transf


(AST/SGOT) 136 IU/L


(5-34) 





 


Alanine Aminotransferase


(ALT/SGPT) 206 IU/L


(0-55) 





 


Alkaline Phosphatase


 692 IU/L


() 





 


Creatine Kinase


 73 IU/L


() 





 


Total Protein


 8.4 g/dL


(6.5-8.1) 





 


Albumin


 3.9 g/dL


(3.5-5.0) 





 


Globulin


 4.5 g/dL


(2.3-3.5) 





 


Albumin/Globulin Ratio 0.9 (0.8-2.0)  


 


Urine Color


 


 Yellow


(YELLOW)


 


Urine Clarity  Clear (CLEAR) 


 


Urine pH  6 (5 - 7) 


 


Urine Specific Gravity


 


 1.025


(1.010-1.025)


 


Urine Protein


 


 Negative


(NEGATIVE)


 


Urine Glucose (UA)


 


 Negative


(NEGATIVE)


 


Urine Ketones


 


 Negative


(NEGATIVE)


 


Urine Blood


 


 Trace


(NEGATIVE)


 


Urine Nitrite


 


 Negative


(NEGATIVE)


 


Urine Bilirubin


 


 Negative


(NEGATIVE)


 


Urine Urobilinogen


 


 0.2 mg/dL (0.2


- 1)


 


Urine Leukocyte Esterase


 


 Negative


(NEGATIVE)


 


Urine RBC  0-5 /HPF (0-5) 


 


Urine WBC  0-5 /HPF (0-5) 


 


Urine Epithelial Cells


 


 Few /LPF


(NONE)


 


Urine Bacteria


 


 Few /HPF


(NONE)


 


Urine Mucus  Few (RARE) 








Lab results reviewed:  Yes


 (ANNEMARIE FERRERA MD)


Lab results reviewed:  Yes


 (LIZZIE BIRD MD)





Imaging


Imaging results reviewed:  Yes


 (LIZZIE BIRD MD)





Procedures


12 Lead ECG Interpretation


ECG Interpretation :  


   ECG:  ECG 1


   :  Interpreted by ED physician


   Date:  Nov 15, 2020


   Time:  16:37


   Rhythm:  sinus rhythm


   Rate:  normal


   BPM:  79


   QRS axis:  normal


   ST segments normal:  Yes


   T waves normal:  Yes


   Clinical Impression:  normal ECG


 (ANNEMARIE FERRERA MD)





Assessment & Plan


Medical Decision Making


MDM


SYNCOPE/FALL, AMS - CBC, CHEM, ECG, CARDIACS, CT BRAIN/C-SPINE, UA/CX, CXR - R/O

CEREBRAL BLEED, CERV FX, UTI, STEMI/NSTEMI, DYSRHYTHMIA. ADMIT


 (ANNEMARIE FERRERA MD)


MDM


PT FOUND TO HAVE CALCIFIED NODULE TO LIVER DOME





DR ARANGO AT [facility] ACCEPTS PT FOR TRANSFER WITHOUT CONFERENCE


 (LIZZIE BIRD MD)


Reassessment


Reassessment


A LACTIC ACID WAS SENT ON PT BUT SHE HAS NO SIRS CRITERIA, ELEVATED LACTIC IS 

NOT DUE TO SEPSIS BUT VOLUME DEPLETION - IVF'S RUNNING. LABS, CT'S TO BE F/U BY 

ONCOMING ER MD DR BIRD


 (ANNEMARIE FERRERA MD)





Assessment & Plan


Final Impression:  


(1) Syncope and collapse (ANNEMARIE FERRERA MD)


Final Impression:  


(1) Syncope and collapse


(2) Elevated liver transaminase level


(3) Liver nodule (LIZZIE BIRD MD)


Depart Disposition:  TRANS TO OTHER [facility] FACILITY


Last Vital Signs











  Date Time  Temp Pulse Resp B/P (MAP) Pulse Ox O2 Delivery O2 Flow Rate FiO2


 


11/15/20 16:42 98.5 57 18 135/75 96 Room Air  








 (SWEET,LAIRD A MD)


Home Meds


Reported Medications


Pantoprazole Sodium (PANTOPRAZOLE SODIUM) 20 Mg Tablet.dr, 40 MG PO BID


   4/18/19


Tramadol Hcl (ULTRAM) 50 Mg Tablet, 50 MG PO PRN, TAB


   4/18/19


Mu-Vits-Min Th/Lycopene/Lutein (CENTRUM SILVER TABLET) 1 Each Tablet, 1 TAB PO 

DAILY


   2/8/19


Cholecalciferol (Vitamin D3) (VITAMIN D3) 2,000 Unit Capsule, 2000 INTLU PO 

DAILY


   2/8/19


Duloxetine Hcl (CYMBALTA) 60 Mg Capsule.dr, 60 MG PO DAILY


   2/8/19


Ferrous Sulfate (FERROUS SULFATE) 325 Mg Tablet, 325 MG PO every other day


   2/8/19


Famotidine (FAMOTIDINE) 20 Mg Tab, 20 MG PO DAILY, #30 TAB


   2/8/19


Losartan/Hydrochlorothiazide (LOSARTAN-HCTZ 100-25 MG TAB) 1 Each Tablet, 1 TAB 

PO DAILY


   2/8/19


Pravastatin Sodium (PRAVASTATIN SODIUM) 80 Mg Tablet, 80 MG PO BEDTIME


   9/5/14


Medications in the ED





Sodium Chloride 1,000 ml @  0 mls/hr Q0M STAT IV ;  Start 11/15/20 at 18:07;  

Stop 11/15/20 at 18:10;  Status DC


 (ANNEMARIE FERRERA MD)











ANNEMARIE FERRERA MD               Nov 15, 2020 18:35


LIZZIE BIRD MD        Nov 15, 2020 20:39

## 2020-11-15 NOTE — XMS REPORT
Continuity of Care Document

                             Created on: 11/15/2020



SOLO SANCHEZ

External Reference #: [id number]

: 1940

Sex: Female



Demographics





                          Address                   [address] [place] [address]  [address]

 

                          Home Phone                [phone]

 

                          Preferred Language        English

 

                          Marital Status            Unknown

 

                          Hoahaoism Affiliation     Unknown

 

                          Race                      Unknown

 

                          Ethnic Group              Unknown





Author





                          Author                    [employer]

 

                          Organization              [employer] [facility]

 Exchange

 

                          Address                   Unknown

 

                          Phone                     Unavailable







Care Team Providers





                    Care Team Member Name Role                Phone

 

                    [employer] Unavailable         Un

available



                                    



Problems

                    



                    Problem                         Status                      

   Onset Date       

                          Classification                         Date Reported  

         

                          Comments                         Source               

     

 

                    Inflammatory Arthritis                         Active       

                    

                          Diagnosis                         2015          

    

                                                    León Blas              

      

 

                          Polyarthritis, multiple sites                         

Active                    

                                             Problem                         2015         

                                                    León Blas              

      

 

                          Allergy, unspecified not elsewhere classified         

                Active    

                                             Problem                         

2015                                                   León Blas     

 

             

 

                    Lumbar Radiculopathy                         Active         

                    

                          Problem                         2015            

      

                                                    León Blas              

      

 

                          Unspecified drug dependence                         Ac

tive                      

                                             Diagnosis                         0

2015         

                                                    León Blas              

      

 

                    Abnormal SUMIT                         Active                 

                    

                    Problem                         2015                  

        

                                        León Blas                    



                                                                                
                                                                       



Medications

                    



                    Medication                         Details                  

       Route        

                          Status                         Patient Instructions   

          

                          Ordering Provider                         Order Date  

               

                                        Source                    

 

                    Meloxicam                         1 tablet                  

       Orally       

                          No Longer Active                         7.5 MG Orally

 bid     

                          Nelsy                         2014             

       

                                        León Blas                    

 

                          Hydrocodone-Acetaminophen                         1 ta

blet as needed            

                          Orally                         No Longer Active       

              

                          2.5-500 MG Orally every 6 hrs                         

Nelsy                 

                          2014                         León Blas     

               

 

                    Gabapentin                         1 -2 capsule             

            Orally  

                          Active                         300 MG Orally at bedtim

e   

                          Blas                         2014             

     

                                        León Blas                    

 

                          Hydrocodone-Acetaminophen                         1 ta

blet as needed            

                    Orally                         Active                       

  5-325 

MG Orally every 12 hours                         Blas                         

2014                              León Blas                    

 

                          Hydrocodone-Acetaminophen                         1 ta

blet as needed            

                    Orally                         Active                       

  5-325 

MG Orally every 12 hours                         Guillen                         

2014                              León Blas                    

 

                    Alendronate Sodium                         1 tablet         

                

Orally                         Active                         70 MG Orally      

                    Guillen                                                  Hu Blas                    

 

                    Pravastatin Sodium                         1 tablet         

                

Orally                         Active                         80 MG Orally Once 

a day                         Nelsy                                            

                                        León Blas                    

 

                    Zetia                         1 tablet                      

   Orally           

                          Active                         10 MG Orally Once a day

             

                    Nelsy                                                  Jacob Blas                    

 

                    Naproxen                         1 tablet as needed         

                

Orally                         Active                         500 MG Orally 

every 12 hrs                         Nelsy                                     

                                        León Blas                    

 

                          Losartan Potassium-HCTZ                         1 tabl

et                        

                    Orally                         Active                       

  50-12.5 MG Orally 

Once a day                         Nelsy                                       

                                        León Blas                    

 

                    Tramadol                         one tab                    

     orally         

                          Active                         50 mg orally every 4-6 

hours prn 

pain                         Nelsy                                             

                                        León Blas                    

 

                    Ranitidine 75                         as directed           

              Orally

                          Active                         75 MG Orally three time

s 

a day                         Mindy Blas                    

 

                    Cetirizine HCl                         1 tablet             

            Orally  

                          Active                         10 MG Orally Twice a da

y   

                     Mindy Blas                    

 

                    Duloxetine HCl                         1 capsule            

             Orally 

                          Active                         30 MG Orally qd        

   

                    Nelsy                                                  Jacob Blas                    

 

                    Aspirin                         1 tablet                    

     Orally         

                          Active                         81 MG Orally Once a day

           

                    Nelsy                                                  Jacob Blas                    

 

                    Gabapentin                         2 caps                   

      NA            

                    Active                         300 mg qd pm                 

        

Nelsy                                                   [facility] Bl[facility]         

 

         



                                                                                
                                                                                
                                                                                
                                                                                




Allergies, Adverse Reactions, Alerts

                    



                    Substance                         Category                  

       Reaction     

                          Severity                         Reaction type        

       

                    Status                         Date Reported                

         

Comments                                Source                    

 

                    [place]                         Adverse Reaction           

              Info 

Not Available                                                   Adverse Reaction

 

                          Active                         2014             

   

                                                    [facility] Bl[facility]              

      



                                                        



Immunizations

        



                                        No Data Provided for This Section



                                     



Results





                                        No Data Provided for This Section



                    



Pathology Reports





                                        No Data Provided for This Section       

             



                            



Diagnostic Reports

            



                                        No Data Provided for This Section       

             



                                                            



Consultation Notes

                    



                                        No Data Provided for This Section       

             



                                                            



Discharge Summaries

                    



                                        No Data Provided for This Section       

             



                                                            



History and Physicals

                    



                                        No Data Provided for This Section       

             



                                                                



Vital Signs

                     



                    Vital Sign                         Value                    

     Date           

                          Comments                         Source               

     

 

                    Weight                         179                          

2014          

                                                    [facility]              

      

 

                    Height                         61                          1

           

                                                    [facility]              

      

 

                    Temperature Oral (F)                         98.2 F         

                

2014                                                   [facility]     

 

             

 

                    Heart Rate                         76                       

   2014       

                                                    [facility]              

      

 

                    Diastolic (mm Hg)                         84                

          2014

                                                    Le[facility] Blas              

   

  

 

                    Systolic (mm Hg)                         124                

          2014

                                                    León Puenteser              

   

  

 

                    Weight                         183                          

2014          

                                                    Le[facility] Puenteser              

      

 

                    Height                         61                          0

2014           

                                                    [facility] Puenteser              

      

 

                    Weight                         182                          

2014          

                                                    [facility] Blas              

      

 

                    Height                         61                          0

2014           

                                                    [facility] Puenteser              

      

 

                    Temperature Oral (F)                         98.4 F         

                

2014                                                   [facility] Puentes[facility]     

 

             

 

                    Heart Rate                         80                       

   2014       

                                                    [facility]              

      

 

                    Diastolic (mm Hg)                         80                

          2014

                                                    [facility]              

   

  

 

                    Systolic (mm Hg)                         130                

          2014

                                                    [facility]              

   

  



                                                                                
                                                                                
                                                                                
                                                     



Encounters

                    



                    Location                         Location Details           

              

Encounter Type                         Encounter Number                         

Reason For Visit                         Attending Provider                     

                    ADM Date                         DC Date                    

     Status      

                                        Source                    

 

                    Braden Blas MD                                        

          [facility] 

                                        [id number]    

                 

                                                                        20

14               

                    2014                                                  

León Blas MD                                        

          [facility] 

                                        [id number]    

                 

                                                                        20

14               

                    2014                                                  

León Bals MD                                        

          [facility] 

                                        [id number]    

                 

                                                                        20

14               

                    2014                                                  

León Blas MD                                        

          [facility] 

                                        [id number]    

                 

                                                                        20

14               

                    2014                                                  

León Blas MD                                        

          [facility] 

                                        [id number]    

                 

                                                                        20

14               

                    2014                                                  

León Blas MD                                        

          [facility] 

                                        [id number]    

                 

                                                                        20

14               

                    2014                                                  

León Blas MD                                        

          [facility] 

                                        [id number]    

                 

                                                                        20

14               

                    2014                                                  

León Blas MD                                        

          [facility] 

                                        [id number]    

                 

                                                                        20

14               

                    2014                                                  

León Blas MD                                        

          [facility] 

                                        [id number]    

                 

                                                                        20

14               

                    2014                                                  

León Blas MD                                        

          reschedule

f/u                                     [id number]    

            

                                                                        20

14          

                    2014                                                  

León Blas MD                                        

          reschedule

f/u                                     [id number]    

            

                                                                        20

14          

                    2014                                                  

León Blas MD                                        

          reschedule

f/u                                     [id number]    

            

                                                                        20

14          

                    2014                                                  

León Blas MD                                        

          reschedule

f/u                                     [id number]    

            

                                                                        20

14          

                    2014                                                  

León Blas MD                                        

          reschedule

f/u                                     [id number]    

            

                                                                        20

14          

                    2014                                                  

León Blas MD                                        

          reschedule

f/u                                     [id number]    

            

                                                                        20

14          

                    2014                                                  

León Blas MD                                        

          reschedule

f/u                                     [id number]    

            

                                                                        20

14          

                    2014                                                  

León Blas MD                                        

          reschedule

f/u                                     [id number]    

            

                                                                        20

14          

                    2014                                                  

León Blas MD                                        

          reschedule

f/u                                     [id number]    

            

                                                                        20

14          

                    2014                                                  

León Blas MD                                        

          reschedule

f/u                                     [id number]    

            

                                                                        20

14          

                    2014                                                  

León Blas MD                                        

          reschedule

f/u                                     [id number]    

            

                                                                        20

14          

                    2014                                                  

León Blas MD                                        

          Gabapentin

                                        [id number]    

                

                                                                        20

14              

                    2014                                                  

León Blas MD                                        

          Gabapentin

                                        [id number]    

                

                                                                        20

14              

                    2014                                                  

León Blas MD                                        

          Gabapentin

                                        [id number]    

                

                                                                        20

14              

                    2014                                                  

León Blas MD                                        

          Gabapentin

                                        [id number]    

                

                                                                        20

14              

                    2014                                                  

León Blas MD                                        

          Gabapentin

                                        [id number]    

                

                                                                        20

14              

                    2014                                                  

León Blas MD                                        

          Gabapentin

                                        [id number]    

                

                                                                        20

14              

                    2014                                                  

León Blas MD                                        

          Gabapentin

                                        [id number]    

                

                                                                        20

14              

                    2014                                                  

León Blas MD                                        

          Gabapentin

                                        [id number]    

                

                                                                        20

14              

                    2014                                                  

León Blas MD                                        

          Gabapentin

                                        [id number]    

                

                                                                        20

14              

                    2014                                                  

León Blas MD                                        

          Gabapentin

                                        [id number]    

                

                                                                        20

14              

                    2014                                                  

León Blas MD                                        

          Gabapentin

                                        [id number]    

                

                                                                        20

14              

                    2014                                                  

León Blas MD                                        

          Gabapentin

                                        [id number]    

                

                                                                        20

14              

                    2014                                                  

León Blas MD                                        

          New order 

form For [facility]                            [id number]    

   

                                                                        20

14 

                          2014                                            

   

                                        León Blas MD                                        

          New order 

form For MRI                            [id number]    

   

                                                                        20

14 

                          2014                                            

   

                                        León Blas MD                                        

          New order 

form For MRI                            [id number]    

   

                                                                        20

14 

                          2014                                            

   

                                        León Blas MD                                        

          New order 

form For MRI                            [id number]    

   

                                                                        20

14 

                          2014                                            

   

                                        León Blas MD                                        

          New order 

form For MRI                            [id number]    

   

                                                                        20

14 

                          2014                                            

   

                                        León Blas MD                                        

          New order 

form For MRI                            [id number]    

   

                                                                        20

14 

                          2014                                            

   

                                        León Blas MD                                        

          New order 

form For MRI                            [id number]    

   

                                                                        20

14 

                          2014                                            

   

                                        León Blas MD                                        

          New order 

form For MRI                            [id number]    

   

                                                                        20

14 

                          2014                                            

   

                                        León Blas MD                                        

          New order 

form For MRI                            [id number]    

   

                                                                        20

14 

                          2014                                            

   

                                        León Blas MD                                        

          New order 

form For MRI                            [id number]    

   

                                                                        20

14 

                          2014                                            

   

                                        León Blas MD                                        

          New order 

form For MRI                            [id number]    

   

                                                                        20

14 

                          2014                                            

   

                                        León Blas MD                                        

          New order 

form For MRI                            [id number]    

   

                                                                        20

14 

                          2014                                            

   

                                        León Blas MD                                        

          New order 

form For MRI                            [id number]    

   

                                                                        20

14 

                          2014                                            

   

                                        León Blas MD                                        

          Unknown   

                                        [id number]    

                   

                                                                        20

14                 

                    2014                                                  

León Blas MD                                        

          Unknown   

                                        [id number]    

                   

                                                                        20

14                 

                    2014                                                  

León Blas MD                                        

          Unknown   

                                        [id number]    

                   

                                                                        20

14                 

                    2014                                                  

León Blas MD                                        

          Unknown   

                                        [id number]    

                   

                                                                        20

14                 

                    2014                                                  

León Blas MD                                        

          Unknown   

                                        [id number]    

                   

                                                                        20

14                 

                    2014                                                  

León Blas MD                                        

          Unknown   

                                        [id number]    

                   

                                                                        20

14                 

                    2014                                                  

León Blas MD                                        

          Unknown   

                                        [id number]    

                   

                                                                        20

14                 

                    2014                                                  

León Blas MD                                        

          Unknown   

                                        [id number]    

                   

                                                                        20

14                 

                    2014                                                  

León Blas MD                                        

          Unknown   

                                        [id number]    

                   

                                                                        20

14                 

                    2014                                                  

León Blas MD                                        

          Referral  

                                        [id number]    

                  

                                                                        20

14                

                    2014                                                  

León Blas MD                                        

          Referral  

                                        [id number]    

                  

                                                                        20

14                

                    2014                                                  

León Blas MD                                        

          Referral  

                                        [id number]    

                  

                                                                        20

14                

                    2014                                                  

León Blas MD                                        

          Referral  

                                        [id number]    

                  

                                                                        20

14                

                    2014                                                  

León Blas MD                                        

          Referral  

                                        [id number]    

                  

                                                                        20

14                

                    2014                                                  

León Blas MD                                        

          Referral  

                                        [id number]    

                  

                                                                        20

14                

                    2014                                                  

León Blas MD                                        

          Referral  

                                        [id number]    

                  

                                                                        20

14                

                    2014                                                  

León Blas MD                                        

          Referral  

                                        [id number]    

                  

                                                                        20

14                

                    2014                                                  

León Blas MD                                        

          Referral  

                                        [id number]    

                  

                                                                        20

14                

                    2014                                                  

León Blas MD                                        

          Referral  

                                        [id number]    

                  

                                                                        20

14                

                    2014                                                  

León Blas MD                                        

          pt call   

                                        [id number]    

                   

                                                                        20

14                 

                    2014                                                  

León Blas MD                                        

          pt call   

                                        [id number]    

                   

                                                                        20

14                 

                    2014                                                  

León Blas MD                                        

          pt call   

                                        [id number]    

                   

                                                                        20

14                 

                    2014                                                  

León Blas MD                                        

          pt call   

                                        [id number]    

                   

                                                                        20

14                 

                    2014                                                  

León Blas MD                                        

          pt call   

                                        [id number]    

                   

                                                                        20

14                 

                    2014                                                  

León Blas MD                                        

          pt call   

                                        [id number]    

                   

                                                                        20

14                 

                    2014                                                  

León Blas MD                                        

          pt call   

                                        [id number]    

                   

                                                                        20

14                 

                    2014                                                  

León Blas MD                                        

          pt call   

                                        [id number]    

                   

                                                                        20

14                 

                    2014                                                  

León Blas MD                                        

          pt call   

                                        [id number]    

                   

                                                                        20

14                 

                    2014                                                  

León Blas MD                                        

          [facility]       

                          [id number]                  

         

                                                     2014                                                  

León Blas MD                                        

          [facility]       

                          [id number]                  

         

                                                     2014                                                  

León Blas MD                                        

          MRI       

                          [id number]                  

         

                                                     2014                                                  

León Blas MD                                        

          MRI       

                          [id number]                  

         

                                                     2014                                                  

León Blas MD                                        

          MRI       

                          [id number]                  

         

                                                     2014                                                  

León Blas MD                                        

          MRI       

                          [id number]                  

         

                                                     2014                                                  

León Blas MD                                        

          MRI       

                          [id number]                  

         

                                                     2014                                                  

León Blas MD                                        

          MRI       

                          [id number]                  

         

                                                     2014                                                  

León Blas MD                                        

          MRI       

                          [id number]                  

         

                                                     2014                                                  

León Blas MD                                        

          Pt back 

pain update                             [id number]    

    

                                                                        20

14  

                          2014                                            

    

                                        León Blas MD                                        

          Pt back 

pain update                             [id number]    

    

                                                                        20

14  

                          2014                                            

    

                                        León Blas MD                                        

          Pt back 

pain update                             [id number]    

    

                                                                        20

14  

                          2014                                            

    

                                        León Blas MD                                        

          Pt back 

pain update                             [id number]    

    

                                                                        20

14  

                          2014                                            

    

                                        León Blas MD                                        

          Pt back 

pain update                             [id number]    

    

                                                                        20

14  

                          2014                                            

    

                                        León Blas MD                                        

          Pt back 

pain update                             [id number]    

    

                                                                        20

14  

                          2014                                            

    

                                        León Blas MD                                        

          Pt back 

pain update                             [id number]    

    

                                                                        20

14  

                          2014                                            

    

                                        León Blas MD                                        

          Pt back 

pain update                             [id number]    

    

                                                                        20

14  

                          2014                                            

    

                                        León Blas MD                                        

          Pt back 

pain update                             [id number]    

    

                                                                        20

14  

                          2014                                            

    

                                        León Blas MD                                        

          Gabapentin

                                        [id number]    

                

                                                                        20

14              

                    2014                                                  

León Blas MD                                        

          Gabapentin

                                        [id number]    

                

                                                                        20

14              

                    2014                                                  

León Blas MD                                        

          Gabapentin

                                        [id number]    

                

                                                                        20

14              

                    2014                                                  

León Blas MD                                        

          Gabapentin

                                        [id number]    

                

                                                                        20

14              

                    2014                                                  

León Blas MD                                        

          Gabapentin

                                        [id number]    

                

                                                                        20

14              

                    2014                                                  

León Blas MD                                        

          Gabapentin

                                        [id number]    

                

                                                                        20

14              

                    2014                                                  

León Blas MD                                        

          Gabapentin

                                        [id number]    

                

                                                                        20

14              

                    2014                                                  

León Blas MD                                        

          Gabapentin

                                        [id number]    

                

                                                                        20

14              

                    2014                                                  

León Blas MD                                        

          Gabapentin

                                        [id number]    

                

                                                                        20

14              

                    2014                                                  

León Blas MD                                        

          refill 

hydrocodone                             [id number]    

    

                                                                        20

14  

                          2014                                            

    

                                        León Blas MD                                        

          refill 

hydrocodone                             [id number]    

    

                                                                        20

14  

                          2014                                            

    

                                        León Blas MD                                        

          refill 

hydrocodone                             [id number]    

    

                                                                        20

14  

                          2014                                            

    

                                        León Blas MD                                        

          refill 

hydrocodone                             [id number]    

    

                                                                        20

14  

                          2014                                            

    

                                        León Blas MD                                        

          refill 

hydrocodone                             [id number]    

    

                                                                        20

14  

                          2014                                            

    

                                        León Blas MD                                        

          refill 

hydrocodone                             [id number]    

    

                                                                        20

14  

                          2014                                            

    

                                        León Blas MD                                        

          refill 

hydrocodone                             [id number]    

    

                                                                        20

14  

                          2014                                            

    

                                        León Blsa MD                                        

          refill 

hydrocodone                             [id number]    

    

                                                                        20

14  

                          2014                                            

    

                                        León Blas MD                                        

          refill 

hydrocodone                             [id number]    

    

                                                                        20

14  

                          2014                                            

    

                                        León Blas MD                                        

          No longer 

wants to be seen here                         

[id number]                                            

                                               07/15/2014                       

  07/15/2014   

                                                    León Blas MD                                        

          No longer 

wants to be seen here                         

[id number]                                            

                                               07/15/2014                       

  07/15/2014   

                                                    León Blas MD                                        

          F/U       

                          [id number]                  

         

                                                     2014                                                  

León Blas   

                



                                                                                
                                                                                
                                                                                
                                                                                
                                                                                
                                                                                
                                                                                
                                                                                
                                                                                
                                                                                
                                                                                
                                                                                
                                                                                
                                                                                
                                                                                
                                                                                
                                                                                
                           



Procedures

        



                                        No Data Provided for This Section



                                                    



Assessment and Plan

                    



                                        No Data Provided for This Section       

             



                                     



Plan of Care





                                        No Data Provided for This Section       

             



                                                                



Social History

                    



                    Social History                         Date                 

        Source      

             

 

                                        Social History ElementQualifiersDate Rep

orted

Tobacco Use:

                                        .   Are you a:: former smoker , How long

 has it been since you last smoked?: 5-

10 years

Dec 29, 2014

Pets:

                                        .  dogs

Dec 29, 2014

Caffeine:

yes.  frequency:, 1-5

Dec 29, 2014

Exercise:

yes.  walking 3 times a week

Dec 29, 2014

Alcohol:

no.  

Dec 29, 2014

Occupation:

                                        .  retired

Dec 29, 2014

                          2014                         León Blas     

 

             



                                                                    



Family History

                    



                                        No Data Provided for This Section       

             



                                                            



Advance Directives

                    



                                        No Data Provided for This Section       

             



                                                            



Functional Status

                    



                                        No Data Provided for This Section

## 2020-11-15 NOTE — XMS REPORT
Continuity of Care Document

                             Created on: 11/15/2020



SOLO SANCHEZ

External Reference #: [id number]

: 1940

Sex: Female



Demographics





                          Address                   [address]

[place]  [address]

 

                          Home Phone                [phone]

 

                          Preferred Language        English

 

                          Marital Status            Unknown

 

                          [employer]aritan Affiliation     Unknown

 

                          Race                      Unknown

 

                          Ethnic Group              Non-





Author





                          Author                    [facility]

t

 

                          Organization              [facility]

 

                          Address                   [address]

[place]  [address]



 

                          Phone                     Unavailable







Support





                Name            Relationship    Address         Phone

 

                    GRISELDA CONKLIN  PRS                 UNK

[place], [place]  [address] [phone]

 

                    GRISELDA CONKLIN  PRS                 UNK

[place], [place]  [address] [phone]

 

                    GRISELDA CONKLIN  PRS                 N/A

[place], [place]  [address] [phone]

 

                    KRISTINA MEHTA         PRS                 [address]

[id number]

[place], [place]  [address] [phone]







Care Team Providers





                    Care Team Member Name Role                Phone

 

                    PANDA ONEILL       Attphys             Unavailable

 

                    SHAISTA DUARTE    Attphys             Unavailable







Payers





           Payer Name Policy Type Policy Number Effective Date Expiration Date S

ource







Problems





           Condition Name Condition Details Condition Category Status     Onset 

Date Resolution

Date            Last Treatment Date Treating Clinician Comments        Source

 

                          Inflammatory Arthritis                            Infl

ammatory Arthritis           

            Active                                                Diagnosis     
                  2015                                                
León Blas                     Diagnosis Active                  2015 

05:22:52                 

Janee Estrella

 

                          Polyarthritis, multiple sites                         

Polyarthritis, multiple 

sites                        Active                                             
  Problem                        2015                                     
          León Blas                     Problem      Active                

                 2015 

05:22:52                                                    Janee Estrella

 

                          Allergy, unspecified not elsewhere classified         

                Allergy, 

unspecified not elsewhere classified                        Active              
                                 Problem                        2015      
                                         León Blas                     

Problem   Active                        2015 05:22:52                     

Janee Estrella

 

                          Lumbar Radiculopathy                              Lumb

ar Radiculopathy               

        Active                                                Problem           
            2015                                                León 
Blas                     Problem Active                  2015 05:22:52  

               Janee Estrella

 

                          Unspecified drug dependence                         Un

specified drug dependence 

                      Active                                                
Diagnosis                        2015                                     
          León Blas                     Diagnosis    Active                

                 2015 

05:22:52                                                    Janee Estrella

 

                          Abnormal SUMIT                                      Abno

rmal SUMIT                        Active 

                                              Problem                        
2015                                                León Blas        
            Problem Active                  2015 05:22:52                 

[facility]







Allergies, Adverse Reactions, Alerts





        Allergy Name Allergy Type Status  Severity Reaction(s) Onset Date Inacti

ve Date 

Treating Clinician        Comments                  Source

 

       NSAIDS (Non-Steroidal Anti-Inflamma DA     Active SV            2019

1 00:00:00                      [facility]

 

       No Known Allergies DA     Active U             2019 00:00:00       

               [facility]

 

       No Known Allergies DA     Active U             2016 00:00:00       

               [facility]

 

       N.CATRINAAAugie HOGAN Active        Info Not Available 2014 00:00:00   

                   [facility]







Social History





           Social Habit Start Date Stop Date  Quantity   Comments   Source

 

           TobaccoUse: 2014 00:00:00 2014 00:00:00                  

     [facility]







Medications





             Ordered Medication Name Filled Medication Name Start Date   Stop Da

te    Current 

Medication? Ordering Clinician Indication Dosage     Frequency  Signature (SIG) 

Comments                  Components                Source

 

      Pravastatin Sodium       2015 05:22:52       Yes   Wajeeha Nelsy   

                1 tablet        

                                        [facility]

 

     Zetia      2015 05:22:52      Yes  Wajeeha Nelsy                1 ta

blet           [facility]

 

      Naproxen       2015 05:22:52       Yes   Wajeeha Nelsy             

      1 tablet as needed        

                                        [facility]

 

       Losartan Potassium-HCTZ        2015 05:22:52        Yes    Wajeeha 

Nelsy                      1 

tablet                                                      [facility]

 

     Tramadol      2015 05:22:52      Yes  Wajeeha Nelsy                o

ne tab           [facility]

 

     Duloxetine HCl      2015 05:22:52      Yes  Wajeeha Nelsy           

     1 capsule           

[facility]

 

     Aspirin      2015 05:22:52      Yes  Wajeeha Nelsy                1 

tablet           [facility]

 

     Gabapentin      2015 05:22:52      Yes  Wajeeha Nelsy               

 2 caps           [facility]

 

     Meloxicam      2014 00:00:00      No   Wajeeha Nelsy                

1 tablet           [facility]

 

     Alendronate Sodium      2014 02:58:51      Yes  Marc Guillen           

     1 tablet           

[facility]

 

     Ranitidine 75      2014 02:52:17      Yes  Marc Guillen                

as directed           

[facility]

 

     Cetirizine HCl      2014 02:52:17      Yes  Marc Guillen               

 1 tablet           

[facility]

 

       Hydrocodone-Acetaminophen        2014 00:00:00        No     Alli Whittington                      1 

tablet as needed                                            [facility]

 

     Gabapentin      2014 00:00:00      Yes  Bradenbarbara Puenteser                

1 -2 capsule           

[facility]

 

       Hydrocodone-Acetaminophen        2014 00:00:00        Yes    Braden Puenteser                      1 

tablet as needed                                            [facility]

 

       Hydrocodone-Acetaminophen        2014 00:00:00        Yes    Marc 

Guillen                      1 tablet 

as needed                                                   [facility]







Vital Signs





             Vital Name   Observation Time Observation Value Comments     Source

 

             Weight       2014 20:45:00                           [facility]

 

             Height       2014 20:45:00                           [facility]

 

             Temperature Oral (F) 2014 20:45:00 98.2 F                    

[facility]

 

             Heart Rate   2014 20:45:00                           [facility]

 [facility]

 

             Diastolic (mm Hg) 2014 20:45:00                           [facility]

[facility]

 

             Systolic (mm Hg) 2014 20:45:00                           [facility]

[facility]

 

             Weight       2014 18:00:00                           [facility]

 

             Height       2014 18:00:00                           [facility]

 

             Weight       2014 18:45:00                           [facility]

 [facility]

 

             Height       2014 18:45:00                           [facility]

 

             Temperature Oral (F) 2014 18:45:00 98.4 F                    

[facility]

 

             Heart Rate   2014 18:45:00                           [facility]

 

             Diastolic (mm Hg) 2014 18:45:00                           [facility]

[facility]

 

             Systolic (mm Hg) 2014 18:45:00                           [facility]







Procedures

This patient has no known procedures.



Encounters





             Start Date/Time End Date/Time [facility] Encounter ID    Source

 

          2014 14:45:00 2014 14:45:00 Outpatient                    

 MD Braden Kent MD      [id number]                     León Blas MD

 

          2014-07-15 11:25:00 2014-07-15 11:25:00 Outpatient                    

 MD Braden Kent MD      [id number]                     León Blas MD

 

          2014 13:23:00 2014 13:23:00 Outpatient                    

 MD Braden Kent MD      [id number]                     León Blas MD

 

          2014 09:52:00 2014 09:52:00 Outpatient                    

 MD Braden Kent MD      [id number]                     León Blas MD

 

          2014 08:40:00 2014 08:40:00 Outpatient                    

 MD Braden Kent MD      [id number]                     León Blas MD

 

          2014 09:16:00 2014 09:16:00 Outpatient                    

 MD Braden Kent MD      [id number]                     León Blas MD

 

          2014 14:53:00 2014 14:53:00 Outpatient                    

 MD Braden Kent MD      [id number]                     León Blas MD

 

          2014 11:18:00 2014 11:18:00 Outpatient                    

 MD Braden Kent MD      [id number]                     León Blas MD

 

          2014 13:00:00 2014 13:00:00 Outpatient                    

 MD Braden Kent MD      [id number]                     León Blas MD

 

          2014 15:34:00 2014 15:34:00 Outpatient                    

 MD Braden Kent MD      [id number]                     León Blas MD

 

          2014 14:03:00 2014 14:03:00 Outpatient                    

 MD Braden Kent MD      [id number]                     León Blas MD

 

          2014 17:07:00 2014 17:07:00 Outpatient                    

 MD Braden Kent MD      [id number]                     León Blas MD

 

          2014 13:45:00 2014 13:45:00 Outpatient                    

 MD Braden Kent MD      [id number]                     León Blas MD







Results





           Test Description Test Time  Test Comments Results    Result Comments 

Source

 

                    PROTHROMBIN TIME    2019 18:40:00   

 

                                        Test Item

 

             PROTHROMBIN TIME PATIENT (test code = PTP) 11.2 seconds 9.0-14.0   

  N             

 

             INTERNATIONAL NORMAL RATIO (test code = INR) 1.0          0.8-1.2  

    N            The therapeutic range

for oral anticoagulant therapy formost indications is an international 
normalized ratio (INR)of between 2.0 and 3.0.  The recommended therapeutic 
INRrange for various clinical situations is listed 
below:_________________________________________________________Clinical 
Situation                          INR 
range_________________________________________________________ Pulmonary e
mbolism treatment              (2.0-3.0)Venous thrombosis treatmentVenous 
thrombosis prophylaxis (high risk surgery)Prevention of systemic embolism from: 
       Acute myocardial infarction         Valvular heart disease         Atrial
fibrillation Mechanical prosthetic heart valves          (2.5-3.5)





IS PATIENT ON ANTICOAGULANTS? NTHROMBOPLASTIN TIME [id number] 18:40:00* 



             Test Item    Value        Reference Range Interpretation Comments

 

             THROMBOPLASTIN TIME PARTIAL (test code = PTT) 27.9 seconds 25.0-36.

5    N             





IS PATIENT ON ANTICOAGULANTS? NCBC W/O [id number] 18:27:00* 



             Test Item    Value        Reference Range Interpretation Comments

 

             WHITE BLOOD CELL (test code = WBC) 10.8 K/mm3   4.5-12.5     N     

        

 

             RED BLOOD CELL (test code = RBC) 3.85 mill/mm3 3.7-5.2      N      

       

 

             HEMOGLOBIN (test code = HGB) 10.5 gram/dL 11.5-15.5    L           

  

 

             HEMATOCRIT (test code = HCT) 34.5 %       36.0-46.0    L           

  

 

             MEAN CELL VOLUME (test code = MCV) 89.6 fL      80-98        N     

        

 

             MEAN CELL HGB (test code = MCH) 27.3 picogram 27.0-33.0    N       

      

 

             MEAN CELL HGB CONCETRATION (test code = MCHC) 30.4 gram/dL 33.0-36.

0    L             

 

             RED CELL DISTRIBUTION WIDTH (test code = RDW) 14.2 %       11.6-16.

2    N             

 

             PLATELET COUNT (test code = PLT) 321 K/mm3    150-450      N       

      

 

             MEAN PLATELET VOLUME (test code = MPV) 9.8 fL       6.7-11.0     N 

            





SPECIMEN CLOTTED; RECOLLECT REQUESTED V.[id number].TS1 [id number] METABOLIC 
[id number] 17:19:00* 



             Test Item    Value        Reference Range Interpretation Comments

 

             SODIUM (test code = NA) 137 mmol/L   136-145      N             

 

             POTASSIUM (test code = K) 4.3 mmol/L   3.5-5.1      N             

 

             CHLORIDE (test code = CL) 102.0 mmol/L        N             

 

             CARBON DIOXIDE (test code = CO2) 25.0 mmol/L  21-32        N       

      

 

             ANION GAP (test code = GAP) 14.3         10-20        N            

 

 

             GLUCOSE (test code = GLU) 103 mg/dL           N             

 

             BLOOD UREA NITROGEN (test code = BUN) 31 mg/dL     7-18         H  

           

 

             GLOMERULAR FILTRATION RATE (test code = GFR) 48 mL/min    >=60     

                 Estimated GFR by 

using Modified MDRD formula.Chronic kidney disease is defined as either kidney 
damageor GFR <60 mL/min/1.73 m2 for >3 months.

 

             CREATININE (test code = CREAT) 1.10 mg/dL   0.55-1.02    H         

   **Note change in reference

range due to change in reagent.**

 

             BUN/CREATININE RATIO (test code = BUN/CREA) 28.2         10-20     

   H             

 

             CALCIUM (test code = CA) 8.6 mg/dL    8.5-10.1     N             





HEPATIC FUNCTION [id number] 17:19:00* 



             Test Item    Value        Reference Range Interpretation Comments

 

             TOTAL PROTEIN (test code = PROT) 7.0 gram/dL  6.4-8.2      N       

      

 

             ALBUMIN (test code = ALB) 3.6 g/dL     3.4-5.0      N             

 

             GLOBULIN (test code = GLOB) 3.4 gram/dL  2.7-4.2      N            

 

 

             ALBUMIN/GLOBULIN RATIO (test code = A/G) 1.1          0.75-1.50    

N             

 

             BILIRUBIN TOTAL (test code = BILT) 0.30 mg/dL   0.0-1.0      N     

        

 

             BILIRUBIN DIRECT (test code = BILD) < 0.05 mg/dL 0.0-0.20     N    

         

 

             SGOT/AST (test code = AST) 23 IUnit/L   15-37        N             

 

             SGPT/ALT (test code = ALT) 30 IUnit/L   12-78        N             

 

             ALKALINE PHOSPHATASE TOTAL (test code = ALKP) 134 IUnit/L    

     H            **Note change

in reference range due to change in reagent.**





BASIC METABOLIC [id number] 17:10:00* 



             Test Item    Value        Reference Range Interpretation Comments

 

             SODIUM (test code = NA) 137 mmol/L   136-145      N             

 

             POTASSIUM (test code = K) 4.3 mmol/L   3.5-5.1      N             

 

             CHLORIDE (test code = CL) 102.0 mmol/L        N             

 

             CARBON DIOXIDE (test code = CO2)  mmol/L      21-32                

      

 

             ANION GAP (test code = GAP)              10-20                     

 

 

             GLUCOSE (test code = GLU)  mg/dL                            

 

             BLOOD UREA NITROGEN (test code = BUN)  mg/dL       7-18            

           

 

             GLOMERULAR FILTRATION RATE (test code = GFR)  mL/min      >=60     

                  

 

             CREATININE (test code = CREAT)  mg/dL       0.55-1.02              

    

 

             BUN/CREATININE RATIO (test code = BUN/CREA)              10-20     

                 

 

             CALCIUM (test code = CA)  mg/dL       8.5-10.1                   





HEPATIC FUNCTION [id number] 17:10:00* 



             Test Item    Value        Reference Range Interpretation Comments

 

             TOTAL PROTEIN (test code = PROT)  gram/dL     6.4-8.2              

      

 

             ALBUMIN (test code = ALB)  g/dL        3.4-5.0                    

 

             GLOBULIN (test code = GLOB)  gram/dL     2.7-4.2                   

 

 

             ALBUMIN/GLOBULIN RATIO (test code = A/G)              0.75-1.50    

              

 

             BILIRUBIN TOTAL (test code = BILT)  mg/dL       0.0-1.0            

        

 

             BILIRUBIN DIRECT (test code = BILD)  mg/dL       0.0-0.20          

         

 

             SGOT/AST (test code = AST)  IUnit/L     15-37                      

 

             SGPT/ALT (test code = ALT)  IUnit/L     12-78                      

 

             ALKALINE PHOSPHATASE TOTAL (test code = ALKP)  IUnit/L       

                   





- CT ABD PELVIS [id number] 16:28:00  Name: SOLO SANHCEZ                 
  [facility]                     : 1940 Age/S: 78  / F         Marychuy Zheng                Unit #: [id number]     Loc:               [place]  [address]              Phys: LOIS VARGAS MD                                     
                Acct: [id number]  Dis Date:               Status: REG ER      
                           PHONE #: [phone]     Exam Date: 2019  1601
                    FAX #: [phone]      Reason: fall, pain                 
                        EXAMS:                                               CPT
CODE:      [id number] CT ABD PELVIS W/CONT                       [address]           
        TECHNIQUE:        - CT CHEST W/CONTRAST,  - CT ABD PELVIS W/CONT .      
          This exam was performed using one or more of the following dose       
reduction techniques: Automated exposure control, adjustment of the mA       
and/ or kV according to patient size or use of iterative       reconstruction 
technique.               COMPARISON: None provided.                HISTORY:     
 78 years Female         fall, pain                                             
  FINDINGS:                               CT CHEST:               Lungs:  No 
pulmonary nodules or irregular mass.  Lungs are normal in       volume.  
Emphysema. Scattered atelectasis bilaterally.  No       bronchiectasis or other 
significant findings.               Mediastinum and Kaye: No significant 
lymphadenopathy.  No mediastinal       or hilar mass.               Pleura: No 
calcifications, effusion, thickening, or pneumothorax.               
Cardiovascular:  Age appropriate.  No significant abnormalities.               
Chest wall and bony structures: No abnormalities.                               
               CT ABDOMEN:               Liver: Fatty liver. No focal lesions.  
          PAGE  1                       Signed Report                    
(CONTINUED)   Name: SOLO SANCHEZ                    [facility]            
        : 1940 Age/S: 78  / F         Marychuy Zheng                
Unit #: [id number]     Loc:               [place]  [address]              
Phys: LOIS VARGAS MD                                                      Acct: 
[id number]  Dis Date:               Status: REG ER                            
     PHONE #: [phone]     Exam Date: 2019  1608                     
FAX #: [phone]      Reason: fall, pain                                     
    EXAMS:                                               CPT CODE:      0
[id number] CT ABD PELVIS W/CONT                       [address]               <
Continued>        Gallbladder and biliary ducts: Cholecystectomy. No intra-or   
   extrahepatic biliary ductal dilatation.               Spleen: Normal in size 
and density.  No focal lesions.               Adrenals: No adrenal nodules or 
enlargement.               Pancreas: No focal lesion, calcifications, pancreatic
duct dilatation,       or peripancreatic fluid collections.               Right 
kidney: Normal in position and size.   No stones.   No       hydronephrosis.  No
focal lesions.               Left kidney:  Normal in position and size.  No 
stones.  No       hydronephrosis.  No focal lesions.                       GI 
structures: No small or large bowel dilatation.   No small or large       bowel 
wall thickening.                   Retroperitoneum and mesentery: No significant
lymphadenopathy.   No       free fluid.   No free air.  No mesenteric 
abnormalities.   No       retroperitoneal abnormality.                  
Abdominal wall: No abdominal wall hernia.               Vascular structures: Age
appropriate.  No aneurysm.                        Osseous structures: Age 
appropriate. Right hip fixation device with       artifact. Old right inferior 
pubic rami fracture. Likely old superior       pubic rami fracture bilaterally. 
                     Soft tissues and musculoskeletal structures: No significant
findings.                         CT PELVIS:       The urinary bladder is 
partially distended and appears unremarkable       from this exam.              
        No abnormalities in pelvic viscera.             PAGE  2                 
     Signed Report                    (CONTINUED)   Name: SOLO SANCHEZ        
           [facility]                     : 1940 Age/S: 78  / F    
    [address] [facility]                Unit #: [id number]     Loc:               
[place]  [address]              Phys: LOIS VARGAS MD                          
                           Acct: [id number]  Dis Date:               Status: 
REG ER                                  PHONE #: [phone]     Exam Date: 
2019  1603                     FAX #: [phone]      Reason: fall, pain
                                         EXAMS:                                 
             CPT CODE:      [id number] CT ABD PELVIS W/CONT                      
[address]               <Continued>                No significant lymphadenopathy.  
   No free fluid.  No inflammatory       changes.  No inguinal abnormalities.   
                                                      IMPRESSION:               
   Impression CT chest:         Emphysema. Scattered atelectasis bilaterally.   
                 Impression CT abdomen and pelvis:         Fatty liver.         
Cholecystectomy.         Right hip fixation device with artifact. Old right 
inferior pubic rami         fracture. Likely old superior pubic rami fracture 
bilaterally.                              ** Electronically Signed by Melissa Velasquez M.D. on 2019 at 1628 **                      Reported and signed by: Melissa Velasquez M.D.                 CC: Pito Oneill DO; LOIS VARGAS MD                 
                                                                                
    Technologist:Wendy Rodriguez RT(R),CT;   CTDI:        DLP:        Trnscb
Date/Time: 2019 (1628) t.LINDSAY                        Orig Print D/T: S:
2019 (2560)      PAGE  3                       Signed Report              
                - CT CHEST W/[id number] 16:28:00  Name: SOLO SANCHEZ  
                 [facility]                     : 1940 Age/S: 78  /
F         [address]                Unit #: [id number]     Loc:           
   [place]  [address]              Phys: LOIS VARGAS MD                       
                              Acct: [id number]  Dis Date:               
Status: REG ER                                  PHONE #: [phone]     Exam 
Date: 2019  1601                     FAX #: [phone]      Reason: 
fall, pain                                          EXAMS:                      
                        CPT CODE:      [id number] CT CHEST W/CONTRAST            
           89020                    TECHNIQUE:        - CT CHEST W/CONTRAST,  - 
CT ABD PELVIS W/CONT .                 This exam was performed using one or more
of the following dose       reduction techniques: Automated exposure control, 
adjustment of the mA       and/ or kV according to patient size or use of 
iterative       reconstruction technique.               COMPARISON: None 
provided.                HISTORY:       78 years Female         fall, pain      
                                         FINDINGS:                              
CT CHEST:               Lungs:  No pulmonary nodules or irregular mass.  Lungs 
are normal in       volume.  Emphysema. Scattered atelectasis bilaterally.  No  
    bronchiectasis or other significant findings.               Mediastinum and 
Kaye: No significant lymphadenopathy.  No mediastinal       or hilar mass.      
        Pleura: No calcifications, effusion, thickening, or pneumothorax.       
       Cardiovascular:  Age appropriate.  No significant abnormalities.         
     Chest wall and bony structures: No abnormalities.                          
                    CT ABDOMEN:               Liver: Fatty liver. No focal 
lesions.             PAGE  1                       Signed Report                
   (CONTINUED)   Name: SOLO SANCHEZ                    [facility]         
           : 1940 Age/S: 78  / F         [address]              
 Unit #: [id number]     Loc:               [place]  [address]              
Phys: LOIS VARGAS MD                                                      Acct: 
[id number]  Dis Date:               Status: REG ER                            
     PHONE #: [phone]     Exam Date: 2019  1606                     
FAX #: [phone]      Reason: fall, pain                                     
    EXAMS:                                               CPT CODE:      0
[id number] CT CHEST W/CONTRAST                        88257               <
Continued>        Gallbladder and biliary ducts: Cholecystectomy. No intra-or   
   extrahepatic biliary ductal dilatation.               Spleen: Normal in size 
and density.  No focal lesions.               Adrenals: No adrenal nodules or 
enlargement.               Pancreas: No focal lesion, calcifications, pancreatic
duct dilatation,       or peripancreatic fluid collections.               Right 
kidney: Normal in position and size.   No stones.   No       hydronephrosis.  No
focal lesions.               Left kidney:  Normal in position and size.  No 
stones.  No       hydronephrosis.  No focal lesions.                       GI 
structures: No small or large bowel dilatation.   No small or large       bowel 
wall thickening.                   Retroperitoneum and mesentery: No significant
lymphadenopathy.   No       free fluid.   No free air.  No mesenteric 
abnormalities.   No       retroperitoneal abnormality.                  
Abdominal wall: No abdominal wall hernia.               Vascular structures: Age
appropriate.  No aneurysm.                        Osseous structures: Age 
appropriate. Right hip fixation device with       artifact. Old right inferior 
pubic rami fracture. Likely old superior       pubic rami fracture bilaterally. 
                     Soft tissues and musculoskeletal structures: No significant
findings.                         CT PELVIS:       The urinary bladder is 
partially distended and appears unremarkable       from this exam.              
        No abnormalities in pelvic viscera.             PAGE  2                 
     Signed Report                    (CONTINUED)   Name: SOLO SANCHEZ        
           [facility]                     : 1940 Age/S: 78  / F    
    [address] [facility]                Unit #: [id number]     Loc:               
[place]  [address]              Phys: LOIS VARGAS MD                          
                           Acct: [id number]  Dis Date:               Status: 
REG ER                                  PHONE #: [phone]     Exam Date: 
2019  1603                     FAX #: [phone]      Reason: fall, pain
                                         EXAMS:                                 
             CPT CODE:      [id number] CT CHEST W/CONTRAST                       
[address]               <Continued>                No significant lymphadenopathy.  
   No free fluid.  No inflammatory       changes.  No inguinal abnormalities.   
                                                      IMPRESSION:               
   Impression CT chest:         Emphysema. Scattered atelectasis bilaterally.   
                 Impression CT abdomen and pelvis:         Fatty liver.         
Cholecystectomy.         Right hip fixation device with artifact. Old right 
inferior pubic rami         fracture. Likely old superior pubic rami fracture 
bilaterally.                              ** Electronically Signed by Melissa Velasquez M.D. on 2019 at 1628 **                      Reported and signed by: Melissa Velasquez M.D.                 CC: Pito Oneill DO; LOIS VARGAS MD                 
                                                                                
    Technologist:Wendy Rodriguez RT(R),CT;   CTDI:        DLP:        Trnscb
Date/Time: 2019 (1628) Suleiman                        Orig Print D/T: S:
2019 (4339)      PAGE  3                       Signed Report              
                - CT HEAD/BRAIN W/O [id number] 15:54:00  Name: SOLO SANCHEZ                    [facility]                     : 1940 Age/S:
78  / F         [address]                Unit #: [id number]     Loc:     
         [place]  [address]              Phys: LOIS VARGAS MD                 
                                    Acct: [id number]  Dis Date:               
Status: REG ER                                  PHONE #: [phone]     Exam 
Date: 2019  1540                     FAX #: [phone]      Reason: 
HEADACHE                                            EXAMS:                      
                        CPT CODE:      [id number] CT HEAD/BRAIN W/O CONT         
           [address]017                    TECHNIQUE:        - CT C-SPINE W/O CONTRAST,
 - CT HEAD/BRAIN W/O CONT .                 This exam was performed using one or
more of the following dose       reduction techniques: Automated exposure 
control, adjustment of the mA       and/ or kV according to patient size or use 
of iterative       reconstruction technique.               COMPARISON: CT brain 
2019               HISTORY:       78 years Female         Neck Pain        
                       FINDINGS:               CT BRAIN:               Supra 
tentorial compartment and Posterior fossa: No hemorrhage.  No       intra-axial 
mass.  No midline shift.         Volume loss and small vessel white matter 
ischemic changes.               Extra-axial structures: No hematoma, fluid 
collection, or mass.               Ventricles and Basal Cisterns: No hydrocep
halus.  Basal cisterns are       normal.               Visualized Orbits: No abn
ormalities.                Visualized Osseous structures: Within normal limits. 
             Visualized paranasal Sinuses: Within normal limits.                
Other: None.                                 FINDINGS:               CT C_SPINE:
              Bones: No acute fractures.  No suspicious focal lesion. Spina bi
fida       occulta C1.      PAGE  1                       Signed Report         
          (CONTINUED)   Name: SOLO SANCHEZ                    [facility]  
                  : 1940 Age/S: 78  / F         [address]       
        Unit #: [id number]     Loc:               [place]  [address]          
   Phys: LOIS VARGAS MD                                                      
Acct: [id number]  Dis Date:               Status: REG ER                      
           PHONE #: [phone]     Exam Date: 2019  1540                
    FAX #: [phone]      Reason: HEADACHE                                   
        EXAMS:                                               CPT CODE:      
[id number] CT HEAD/BRAIN W/O CONT                     [address]87               <
Continued>                Alignment: No subluxation.               Soft tissues:
No abnormalities.   Prevertebral soft tissues,       perivertebral soft tissues 
are normal.                 Intervertebral discs:               Mild spondylosis
multiple levels of the cervical spine.                       Other:  
Heterogeneous appearance of the thyroid gland.                       IMPRESSION 
CT BRAIN AND C-SPINE:               CT BRAIN:       No acute hemorrhage.       
Volume loss and small vessel white matter ischemic change.               CT C-
SPINE:       No acute fractures cervical spine.       Mild spondylosis multiple 
levels.                                  ** Electronically Signed by Melissa Velasquez M.D. on 2019 at 1554 **                      Reported and signed by: Melissa Velasquez M.D.            CC: Pito Oneill DO; LOIS VARGAS MD                      
                                                                                
Technologist:Wendy Rodriguez RT(R),CT;   CTDI:        DLP:        Trnscb 
Date/Time: 2019 (3716) t.SDR.ANEL                        Orig Print D/T: S:
2019 (9508)      PAGE  2                       Signed Report              
                - CT C-SPINE W/O [id number] 15:54:00  Name: SOLO SANCHEZ                    [facility]                     : 1940 Age/S:
78  / F         [address] [facility]                Unit #: [id number]     Loc:     
         [place]  [address]              Phys: LOIS VARGAS MD                 
                                    Acct: [id number]  Dis Date:               
Status: REG ER                                  PHONE #: [phone]     Exam 
Date: 2019  1540                     FAX #: [phone]      Reason: Neck
Pain                                           EXAMS:                           
                   CPT CODE:      [id number] CT C-SPINE W/O CONTRAST             
      [address]                    TECHNIQUE:        - CT C-SPINE W/O CONTRAST,  - 
CT HEAD/BRAIN W/O CONT .                 This exam was performed using one or 
more of the following dose       reduction techniques: Automated exposure 
control, adjustment of the mA       and/ or kV according to patient size or use 
of iterative       reconstruction technique.               COMPARISON: CT brain 
2019               HISTORY:       78 years Female         Neck Pain        
                       FINDINGS:               CT BRAIN:               Supra 
tentorial compartment and Posterior fossa: No hemorrhage.  No       intra-axial 
mass.  No midline shift.         Volume loss and small vessel white matter 
ischemic changes.               Extra-axial structures: No hematoma, fluid 
collection, or mass.               Ventricles and Basal Cisterns: No hydrocep
halus.  Basal cisterns are       normal.               Visualized Orbits: No abn
ormalities.                Visualized Osseous structures: Within normal limits. 
             Visualized paranasal Sinuses: Within normal limits.                
Other: None.                                 FINDINGS:               CT C_SPINE:
              Bones: No acute fractures.  No suspicious focal lesion. Spina bi
fida       occulta C1.      PAGE  1                       Signed Report         
          (CONTINUED)   Name: SOLO SANCHEZ                    [facility]  
                  : 1940 Age/S: 78  / F         [address]       
        Unit #: [id number]     Loc:               [place]  [address]          
   Phys: LOIS VARGAS MD                                                      
Acct: [id number]  Dis Date:               Status: REG ER                      
           PHONE #: [phone]     Exam Date: 2019  1540                
    FAX #: [phone]      Reason: Neck Pain                                  
        EXAMS:                                               CPT CODE:      
[id number] CT C-SPINE W/O CONTRAST                    [address]               <
Continued>                Alignment: No subluxation.               Soft tissues:
No abnormalities.   Prevertebral soft tissues,       perivertebral soft tissues 
are normal.                 Intervertebral discs:               Mild spondylosis
multiple levels of the cervical spine.                       Other:  
Heterogeneous appearance of the thyroid gland.                       IMPRESSION 
CT BRAIN AND C-SPINE:               CT BRAIN:       No acute hemorrhage.       
Volume loss and small vessel white matter ischemic change.               CT C-
SPINE:       No acute fractures cervical spine.       Mild spondylosis multiple 
levels.                                  ** Electronically Signed by Melissa Velasquez M.D. on 2019 at 1554 **                      Reported and signed by: Melissa Velasquez M.D.            CC: Pito Oneill DO; LOIS VARGAS MD                      
                                                                                
Technologist:Wendy Rodriguez RT(R),CT;   CTDI:        DLP:        Trnscb 
Date/Time: 2019 (0516) t.SDR.[email or web address]                        Orig Print D/T: S:
2019 (0851)      PAGE  2                       Signed Report              
                - XR CHEST 1 -21-19 15:18:00 FAX: Pito Berger DO
   [phone]    [place]: B   St: REG FAX:         LOIS VARGAS MD              
       ----------------------------------------
---------------------------------------  Name:   SOLO SANCHEZ                 
 [facility]                     : 1940  Age/S: 78/F           [address]                Unit #: [id number]      Loc: [place]  
[place]  [address]              Phys: LOIS VARGAS MD                                     
                Acct: [id number] Dis Date:               Status: REG ER       
                         PHONE #: [phone]     Exam Date:     2019    
1512                   FAX #: [phone]     Reason: CHEST PAIN               
                         EXAMS:                                               
CPT CODE:      [id number] XR CHEST 1 V                               [address]94       
            TECHNIQUE - XR CHEST 1 V .                 COMPARISON: Chest x-ray 
2019               HISTORY:       78 years Female         CHEST PAIN        
                       FINDINGS:               Lungs: 1 cm density left mid lung
field versus overlapping shadow.       Follow-up advised. Not well seen on the 
old study  study 2019.       Lungs are normal in volume.               M
ediastinum and kaye: No enlargement or other mass.               Cardiovascular 
structures: No cardiomegaly.  No abnormalities in       vascular structures.    
           Pleura/CP angles: Clear.  No pneumothorax.               Bones: No o
sseous abnormalities. Scoliosis.               Soft tissues: No abnormalities.  
            Tubes and lines: None.                Other: None.                 
IMPRESSION:         1 cm density left mid lung field versus overlapping shadow. 
Follow-up         advised. Not well seen on the old study  study 2019.      
                       ** Electronically Signed by Melissa Velasquez M.D. on 2019
at 1512 **                      Reported and signed by: Melissa Velasquez M.D.         
PAGE  1                       Signed Report                    (CONTINUED)  FAX:
Pito Berger      [phone]    [place]:    St: REG FAX:         
LOIS VARGAS MD                      -------------------------------------------
------------------------------------  Name:   SOLO SANCHEZ                   [facility]                     : 1940  Age/S: 78/F           [address]                Unit #: [id number]      [place]: [place]
 [address]              Phys: LOIS VARGAS MD                                        
             Acct: [id number] Dis Date:               Status: REG ER          
                      PHONE #: [phone]     Exam Date:     2019     
1512                   FAX #: [phone]     Reason: CHEST PAIN               
                         EXAMS:                                               
CPT CODE:      [id number] XR CHEST 1 V                               [address]       
       <Continued>                                       CC: Pito Oneill  DO; 
LOIS VARGAS MD                                                                  
                                    Technologist: RT DOMINIK(R)            
                        Trnscrd Date/Time/By: 2019 (754) : By: Suleiman 
         Orig Print D/T: S: 2019 (8521)                         PAGE  2   
                   Signed Report                               - CT HEAD/BRAIN 
W/O [id number] 16:31:00  Name: SOLO SANCHEZ                    [facility]                     : 1940 Age/S: 78  / F         [address]                Unit #: [id number]     Loc:               [place]  [address]              Phys: More Morris MD                              
                Acct: [id number]  Dis Date:               Status: REG ER      
                           PHONE #: [phone]     Exam Date: 2019  1559
                    FAX #: [phone]      Reason: fall, contusion to left 
forehead                    EXAMS:                                              
CPT CODE:      [id number] CT HEAD/BRAIN W/O CONT                     [address]87       
            EXAM: CT of the head without contrast;               INFORMATION: 
Status post fall; contusion to left 4 head;               TECHNIQUE AND 
FINDINGS:       CT dose reduction protocol;       2.5 mm axial scans.       
There is no evidence of intra or extra-axial hemorrhage, mass lesions       or 
midline shift.       There are periventricular and deep white matter 
hypodensities;       otherwise, unremarkable gray/white matter differentiation. 
     Ventricles are symmetric and are slightly prominent; sulci and basilar     
 cisterns are intact.       The calvarium is intact.  Paranasal sinuses and 
mastoid air cells are       well aerated.       There is a small left frontal 
scalp hematoma.                 IMPRESSION:         1.  No evidence of 
intracranial hemorrhage or acute territorial         infarction.         2.  No 
evidence of skull fracture.         3.  No significant change compared with a 
study from 2019,         showing chronic ischemic white matter messina
es.                   ** Electronically Signed by LEOPOLDO Dumont **       
   **             on 2019 at 1631             **                      Rep
orted and signed by: William Dumont M.D.               CC: Pito Oneill DO; 
More Morris MD                                                           
                                    Technologist:Wen Lai RT(R)(CT)    
CTDI:        DLP:        Trnscb Date/Time: 2019 () [email or web address].GRW          
             Orig Print D/T: S: 2019 (7045)       CTDI:          DLP:     
    PAGE  1                       Signed Report                               - 
XR HAND 3 + V [id number] 16:16:00 FAX: Pito Berger DO    
[phone]    [place]:    St: REG FAX: More Adam 
[phone]   ----------------------------------------
---------------------------------------  Name:   SOLO SANCHEZ                 
 [facility]                     : 1940  Age/S: 78/F           [address] 
[facility]                Unit #: [id number]      Loc: [place]  [address]              Phys: oMre Morris MD                              
                Acct: [id number] Dis Date:               Status: REG ER       
                         PHONE #: [phone]     Exam Date:     2019    
1552                   FAX #: [phone]     Reason: fall, ttp wrist          
                         EXAMS:                                               
CPT CODE:      [id number] XR HAND 3 + V LT                           63590       
            EXAM: Left hand, 3 views and left wrist, 3 views;               
INFORMATION: Status post fall, pain;               FINDINGS:       Imaged bones 
are intact; no evidence of fracture or dislocation.       Diffuse osteoporosis. 
     Advanced degenerative changes of the 1st carpometacarpal joint and       m
oderate degenerative changes of interphalangeal joints.       Periarticular soft
tissues are unremarkable; no radiopaque foreign       bodies.                 I
MPRESSION:         1.  No evidence of acute osseous trauma.         2.  Osteopor
osis.         3.  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
he         interphalangeal joints.                   ** Electronically Signed by
LEOPOLDO Dumont **           **             on 2019 at 1616          
  **                      Reported and signed by: William Dumont M.D.         
           CC: Pito Oneill DO; More Morris MD                         
                                                                      Techno
logist: Adore Jasper RT(R)                                    Trnscrd Date/Time
/By: 2019 (8424) : By: Eladio.GRW           Orig Print D/T: S: 2019 (
1616)                         PAGE  1                       Signed Report       
                       - XR WRIST 3 + V [id number] 16:16:00 FAX: Pito Berger DO    [phone]    [place]:    St: REG FAX: More Adam [phone]   ----------------------------------------
---------------------------------------  Name:   SOLO SANCHEZ                 
 [facility]                     : 1940  Age/S: 78/F           [address] 
[facility]                Unit #: [id number]      Loc: [place]  [address]              Phys: More Morris MD                              
                Acct: [id number] Dis Date:               Status: REG ER       
                         PHONE #: [phone]     Exam Date:     2019    
1552                   FAX #: [phone]     Reason: fall, ttp wrist          
                         EXAMS:                                               
CPT CODE:      [id number] XR WRIST 3 + V LT                          23080       
            EXAM: Left hand, 3 views and left wrist, 3 views;               
INFORMATION: Status post fall, pain;               FINDINGS:       Imaged bones 
are intact; no evidence of fracture or dislocation.       Diffuse osteoporosis. 
     Advanced degenerative changes of the 1st carpometacarpal joint and       m
oderate degenerative changes of interphalangeal joints.       Periarticular soft
tissues are unremarkable; no radiopaque foreign       bodies.                 I
MPRESSION:         1.  No evidence of acute osseous trauma.         2.  Osteopor
osis.         3.  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
he         interphalangeal joints.                   ** Electronically Signed by
LEOPOLDO Dumont **           **             on 2019 at 1616          
  **                      Reported and signed by: William Dumont M.D.         
           CC: Pito Oneill DO; More Morris MD                         
                                                                      Techno
logist: Adore Jasper RT(R)                                    Trnscrd Date/Time
/By: 2019 (9775) : By: ReyGRW           Orig Print D/T: S: 2019 (
6275)                         PAGE  1                       Signed Report       
                       - XR FEMUR MIN 2 VWS [id number] 16:12:00 FAX: Pito Berger DO    [phone]    [place]:    St: REG FAX: More Adam   [phone]   ----------------------------------------
---------------------------------------  Name:   SOLO SANCHEZ                 
 [facility]                     : 1940  Age/S: 78/F           [address]                Unit #: [id number]      Loc: [place]  [address]              Phys: More Morris MD                              
                Acct: [id number] Dis Date:               Status: REG ER       
                         PHONE #: [phone]     Exam Date:     2019    
1552                   FAX #: [phone]     Reason: fall, pain distal thigh  
                         EXAMS:                                               
CPT CODE:      [id number] XR FEMUR MIN 2 VWS LT                      [id number]       
            EXAM: Left femur, 4 views and left knee, 3 views;               
INFORMATION: Status post fall; distal thigh and knee pain;               FINDING
S:       Imaged bones are intact; no evidence of fracture or dislocation.       
There is moderate narrowing of the medial compartment of the joint       space. 
     Periarticular soft tissues are unremarkable.       No radiopaque foreign b
odies.                 IMPRESSION:         1.  No evidence of acute osseous trau
ma.         2.  Moderate osteoarthritis of the left knee joint.                 
 ** Electronically Signed by LEOPOLDO Dumont **           **             on
2019 at 1612             **                      Reported and signed by: 
William Dumont M.D.                        CC: Pito Oneill DO; Indy Morris MD                                                                      
                         Technologist: Adore Hutchinson RT(R)                      
             Trnscrd Date/Time/By: 2019 (7126) : By: ReyGRW           
Orig Print D/T: S: 2019 (9588)                         PAGE  1            
          Signed Report                               - XR KNEE 3 V [id number]
16:12:00 FAX: Pito Berger DO    [phone]    [place]:    St: REG 
FAX: More Adam [phone]   
------------------------------------------------------------------------------- 
Name:   SOLO SANCHEZ                   [facility]                     :
1940  Age/S: 78/F           [address] [facility]                Unit #: 
[id number]      Loc: [facility]        [place]  [address]              Phys: 
More Morris MD                                               Acct: 
[id number] Dis Date:               Status: REG ER                             
   PHONE #: [phone]     Exam Date:     2019     [facility]                 
 FAX #: [phone]     Reason: fall, bruising to ant knee                     
   EXAMS:                                               CPT CODE:      [id number]
XR KNEE 3 V LT                             [id number]                    EXAM: Left 
femur, 4 views and left knee, 3 views;               INFORMATION: Status post 
fall; distal thigh and knee pain;               FINDINGS:       Imaged bones are
intact; no evidence of fracture or dislocation.       There is moderate 
narrowing of the medial compartment of the joint       space.       
Periarticular soft tissues are unremarkable.       No radiopaque foreign bodies.
                IMPRESSION:         1.  No evidence of acute osseous trauma.    
    2.  Moderate osteoarthritis of the left knee joint.                   ** 
Electronically Signed by LEOPOLDO Dumont **           **             on 
2019 at 1612             **                      Reported and signed by: 
William Dumont M.D.                        CC: Pito Oneill DO; Indy Morris MD                                                                      
                         Technologist: Adore Hutchinson RT(R)                      
             Trnscrd Date/Time/By: 2019 (5337) : By: ReyGRW           
Orig Print D/T: S: 2019 (2065)                         PAGE  1            
          Signed Report                               CBC W/AUTO [id number] 
07:00:00* 



             Test Item    Value        Reference Range Interpretation Comments

 

             WHITE BLOOD CELL (test code = WBC) 9.5 K/mm3    4.5-12.5     N     

        

 

             RED BLOOD CELL (test code = RBC) 3.63 mill/mm3 3.7-5.2      L      

       

 

             HEMOGLOBIN (test code = HGB) 9.7 gram/dL  11.5-15.5    L           

  

 

             HEMATOCRIT (test code = HCT) 32.6 %       36.0-46.0    L           

  

 

             MEAN CELL VOLUME (test code = MCV) 89.8 fL      80-98        N     

        

 

             MEAN CELL HGB (test code = MCH) 26.7 picogram 27.0-33.0    L       

      

 

             MEAN CELL HGB CONCETRATION (test code = MCHC) 29.8 gram/dL 33.0-36.

0    L             

 

             RED CELL DISTRIBUTION WIDTH (test code = RDW) 14.0 %       11.6-16.

2    N             

 

             RED CELL DISTRIBUTION WIDTH SD (test code = RDW-SD) 45.6 fL      37

.0-51.0    N             

 

             PLATELET COUNT (test code = PLT) 428 K/mm3    150-450      N       

      

 

             MEAN PLATELET VOLUME (test code = MPV) 9.8 fL       6.7-11.0     N 

            

 

             NEUTROPHIL % (test code = NT%) 68.2 %       39.0-69.0    N         

    

 

             IMMATURE GRANULOCYTE % (test code = IG%) 0.5 %        0.0-5.0      

N             

 

             LYMPHOCYTE % (test code = LY%) 17.1 %       25.0-55.0    L         

    

 

             MONOCYTE % (test code = MO%) 10.3 %       0.0-10.0     H           

  

 

             EOSINOPHIL % (test code = EO%) 3.4 %        0.0-5.0      N         

    

 

             BASOPHIL % (test code = BA%) 0.5 %        0.0-1.0      N           

  

 

             NUCLEATED RBC % (test code = NRBC%) 0.0 %        0-0          N    

         

 

             NEUTROPHIL # (test code = NT#) 6.44 K/mm3   1.8-7.7      N         

    

 

             IMMATURE GRANULOCYTE # (test code = IG#) 0.05 x10 3/uL 0-0.03      

 H             

 

             LYMPHOCYTE # (test code = LY#) 1.62 K/mm3   1.0-5.0      N         

    

 

             MONOCYTE # (test code = MO#) 0.97 K/mm3   0-0.8        H           

  

 

             EOSINOPHIL # (test code = EO#) 0.32 K/mm3   0.0-0.5      N         

    

 

             BASOPHIL # (test code = BA#) 0.05 K/mm3   0.0-0.2      N           

  

 

             NUCLEATED RBC # (test code = NRBC#) 0.00 K/mm3   0.0-0.1      N    

         

 

             MANUAL DIFF REQUIRED (test code = MDIFF) NO, ONLY SCAN NEEDED      

                      





DIFFERENTIAL [id number] 07:00:00* 



             Test Item    Value        Reference Range Interpretation Comments

 

             STAIN ACCEPTABILITY (test code = STN ACCEPTABLE) STAIN ACCEPTABLE  

                          

 

             ANISOCYTOSIS (test code = ANISO) 1+                                

      

 

             MICROCYTOSIS (test code = MICR) 1+                                 

     

 

             PLATELET ESTIMATE (test code = PLTEST) ADEQUATE                    

            

 

             PLATELET MORPHOLOGY (test code = PLTMORPH) NORMAL                  

                





CBC W/AUTO [id number] 06:01:00* 



             Test Item    Value        Reference Range Interpretation Comments

 

             WHITE BLOOD CELL (test code = WBC) 9.5 K/mm3    4.5-12.5     N     

        

 

             RED BLOOD CELL (test code = RBC) 3.63 mill/mm3 3.7-5.2      L      

       

 

             HEMOGLOBIN (test code = HGB) 9.7 gram/dL  11.5-15.5    L           

  

 

             HEMATOCRIT (test code = HCT) 32.6 %       36.0-46.0    L           

  

 

             MEAN CELL VOLUME (test code = MCV) 89.8 fL      80-98        N     

        

 

             MEAN CELL HGB (test code = MCH) 26.7 picogram 27.0-33.0    L       

      

 

             MEAN CELL HGB CONCETRATION (test code = MCHC) 29.8 gram/dL 33.0-36.

0    L             

 

             RED CELL DISTRIBUTION WIDTH (test code = RDW) 14.0 %       11.6-16.

2    N             

 

             RED CELL DISTRIBUTION WIDTH SD (test code = RDW-SD) 45.6 fL      37

.0-51.0    N             

 

             PLATELET COUNT (test code = PLT) 428 K/mm3    150-450      N       

      

 

             MEAN PLATELET VOLUME (test code = MPV) 9.8 fL       6.7-11.0     N 

            

 

             NEUTROPHIL % (test code = NT%) 68.2 %       39.0-69.0    N         

    

 

             IMMATURE GRANULOCYTE % (test code = IG%) 0.5 %        0.0-5.0      

N             

 

             LYMPHOCYTE % (test code = LY%) 17.1 %       25.0-55.0    L         

    

 

             MONOCYTE % (test code = MO%) 10.3 %       0.0-10.0     H           

  

 

             EOSINOPHIL % (test code = EO%) 3.4 %        0.0-5.0      N         

    

 

             BASOPHIL % (test code = BA%) 0.5 %        0.0-1.0      N           

  

 

             NUCLEATED RBC % (test code = NRBC%) 0.0 %        0-0          N    

         

 

             NEUTROPHIL # (test code = NT#) 6.44 K/mm3   1.8-7.7      N         

    

 

             IMMATURE GRANULOCYTE # (test code = IG#) 0.05 x10 3/uL 0-0.03      

 H             

 

             LYMPHOCYTE # (test code = LY#) 1.62 K/mm3   1.0-5.0      N         

    

 

             MONOCYTE # (test code = MO#) 0.97 K/mm3   0-0.8        H           

  

 

             EOSINOPHIL # (test code = EO#) 0.32 K/mm3   0.0-0.5      N         

    

 

             BASOPHIL # (test code = BA#) 0.05 K/mm3   0.0-0.2      N           

  

 

             NUCLEATED RBC # (test code = NRBC#) 0.00 K/mm3   0.0-0.1      N    

         

 

             MANUAL DIFF REQUIRED (test code = MDIFF) NO, ONLY SCAN NEEDED      

                      





DIFFERENTIAL [id number] 06:01:00* 



             Test Item    Value        Reference Range Interpretation Comments

 

             STAIN ACCEPTABILITY (test code = STN ACCEPTABLE)                   

                      

 

             CABOT RINGS (test code = CAB)                                      

   

 

             MORPHOLOGY COMMENT (test code = MOC)                               

          

 

             PLATELET ESTIMATE (test code = PLTEST)                             

            

 

             PLATELET MORPHOLOGY (test code = PLTMORPH)                         

                





CBC W/AUTO [id number] 06:01:00* 



             Test Item    Value        Reference Range Interpretation Comments

 

             WHITE BLOOD CELL (test code = WBC) 9.5 K/mm3    4.5-12.5     N     

        

 

             RED BLOOD CELL (test code = RBC) 3.63 mill/mm3 3.7-5.2      L      

       

 

             HEMOGLOBIN (test code = HGB) 9.7 gram/dL  11.5-15.5    L           

  

 

             HEMATOCRIT (test code = HCT) 32.6 %       36.0-46.0    L           

  

 

             MEAN CELL VOLUME (test code = MCV) 89.8 fL      80-98        N     

        

 

             MEAN CELL HGB (test code = MCH) 26.7 picogram 27.0-33.0    L       

      

 

             MEAN CELL HGB CONCETRATION (test code = MCHC) 29.8 gram/dL 33.0-36.

0    L             

 

             RED CELL DISTRIBUTION WIDTH (test code = RDW) 14.0 %       11.6-16.

2    N             

 

             RED CELL DISTRIBUTION WIDTH SD (test code = RDW-SD) 45.6 fL      37

.0-51.0    N             

 

             PLATELET COUNT (test code = PLT) 428 K/mm3    150-450      N       

      

 

             MEAN PLATELET VOLUME (test code = MPV) 9.8 fL       6.7-11.0     N 

            

 

             NEUTROPHIL % (test code = NT%) 68.2 %       39.0-69.0    N         

    

 

             IMMATURE GRANULOCYTE % (test code = IG%) 0.5 %        0.0-5.0      

N             

 

             LYMPHOCYTE % (test code = LY%) 17.1 %       25.0-55.0    L         

    

 

             MONOCYTE % (test code = MO%) 10.3 %       0.0-10.0     H           

  

 

             EOSINOPHIL % (test code = EO%) 3.4 %        0.0-5.0      N         

    

 

             BASOPHIL % (test code = BA%) 0.5 %        0.0-1.0      N           

  

 

             NUCLEATED RBC % (test code = NRBC%) 0.0 %        0-0          N    

         

 

             NEUTROPHIL # (test code = NT#) 6.44 K/mm3   1.8-7.7      N         

    

 

             IMMATURE GRANULOCYTE # (test code = IG#) 0.05 x10 3/uL 0-0.03      

 H             

 

             LYMPHOCYTE # (test code = LY#) 1.62 K/mm3   1.0-5.0      N         

    

 

             MONOCYTE # (test code = MO#) 0.97 K/mm3   0-0.8        H           

  

 

             EOSINOPHIL # (test code = EO#) 0.32 K/mm3   0.0-0.5      N         

    

 

             BASOPHIL # (test code = BA#) 0.05 K/mm3   0.0-0.2      N           

  

 

             NUCLEATED RBC # (test code = NRBC#) 0.00 K/mm3   0.0-0.1      N    

         

 

             MANUAL DIFF REQUIRED (test code = MDIFF) NO, ONLY SCAN NEEDED      

                      





DIFFERENTIAL [id number] 06:01:00* 



             Test Item    Value        Reference Range Interpretation Comments

 

             STAIN ACCEPTABILITY (test code = STN ACCEPTABLE)                   

                      

 

             CABOT RINGS (test code = CAB)                                      

   

 

             MORPHOLOGY COMMENT (test code = MOC)                               

          

 

             PLATELET ESTIMATE (test code = PLTEST)                             

            

 

             PLATELET MORPHOLOGY (test code = PLTMORPH)                         

                





CBC W/AUTO [id number] 06:01:00* 



             Test Item    Value        Reference Range Interpretation Comments

 

             WHITE BLOOD CELL (test code = WBC) 9.5 K/mm3    4.5-12.5     N     

        

 

             RED BLOOD CELL (test code = RBC) 3.63 mill/mm3 3.7-5.2      L      

       

 

             HEMOGLOBIN (test code = HGB) 9.7 gram/dL  11.5-15.5    L           

  

 

             HEMATOCRIT (test code = HCT) 32.6 %       36.0-46.0    L           

  

 

             MEAN CELL VOLUME (test code = MCV) 89.8 fL      80-98        N     

        

 

             MEAN CELL HGB (test code = MCH) 26.7 picogram 27.0-33.0    L       

      

 

             MEAN CELL HGB CONCETRATION (test code = MCHC) 29.8 gram/dL 33.0-36.

0    L             

 

             RED CELL DISTRIBUTION WIDTH (test code = RDW) 14.0 %       11.6-16.

2    N             

 

             RED CELL DISTRIBUTION WIDTH SD (test code = RDW-SD) 45.6 fL      37

.0-51.0    N             

 

             PLATELET COUNT (test code = PLT) 428 K/mm3    150-450      N       

      

 

             MEAN PLATELET VOLUME (test code = MPV) 9.8 fL       6.7-11.0     N 

            

 

             NEUTROPHIL % (test code = NT%) 68.2 %       39.0-69.0    N         

    

 

             IMMATURE GRANULOCYTE % (test code = IG%) 0.5 %        0.0-5.0      

N             

 

             LYMPHOCYTE % (test code = LY%) 17.1 %       25.0-55.0    L         

    

 

             MONOCYTE % (test code = MO%) 10.3 %       0.0-10.0     H           

  

 

             EOSINOPHIL % (test code = EO%) 3.4 %        0.0-5.0      N         

    

 

             BASOPHIL % (test code = BA%) 0.5 %        0.0-1.0      N           

  

 

             NUCLEATED RBC % (test code = NRBC%) 0.0 %        0-0          N    

         

 

             NEUTROPHIL # (test code = NT#) 6.44 K/mm3   1.8-7.7      N         

    

 

             IMMATURE GRANULOCYTE # (test code = IG#) 0.05 x10 3/uL 0-0.03      

 H             

 

             LYMPHOCYTE # (test code = LY#) 1.62 K/mm3   1.0-5.0      N         

    

 

             MONOCYTE # (test code = MO#) 0.97 K/mm3   0-0.8        H           

  

 

             EOSINOPHIL # (test code = EO#) 0.32 K/mm3   0.0-0.5      N         

    

 

             BASOPHIL # (test code = BA#) 0.05 K/mm3   0.0-0.2      N           

  

 

             NUCLEATED RBC # (test code = NRBC#) 0.00 K/mm3   0.0-0.1      N    

         

 

             MANUAL DIFF REQUIRED (test code = MDIFF) NO, ONLY SCAN NEEDED      

                      





DIFFERENTIAL [id number] 06:01:00* 



             Test Item    Value        Reference Range Interpretation Comments

 

             STAIN ACCEPTABILITY (test code = STN ACCEPTABLE)                   

                      

 

             MORPHOLOGY COMMENT (test code = MOC)                               

          

 

             PLATELET ESTIMATE (test code = PLTEST)                             

            

 

             PLATELET MORPHOLOGY (test code = PLTMORPH)                         

                





CBC W/AUTO [id number] 06:01:00* 



             Test Item    Value        Reference Range Interpretation Comments

 

             WHITE BLOOD CELL (test code = WBC) 9.5 K/mm3    4.5-12.5     N     

        

 

             RED BLOOD CELL (test code = RBC) 3.63 mill/mm3 3.7-5.2      L      

       

 

             HEMOGLOBIN (test code = HGB) 9.7 gram/dL  11.5-15.5    L           

  

 

             HEMATOCRIT (test code = HCT) 32.6 %       36.0-46.0    L           

  

 

             MEAN CELL VOLUME (test code = MCV) 89.8 fL      80-98        N     

        

 

             MEAN CELL HGB (test code = MCH) 26.7 picogram 27.0-33.0    L       

      

 

             MEAN CELL HGB CONCETRATION (test code = MCHC) 29.8 gram/dL 33.0-36.

0    L             

 

             RED CELL DISTRIBUTION WIDTH (test code = RDW) 14.0 %       11.6-16.

2    N             

 

             RED CELL DISTRIBUTION WIDTH SD (test code = RDW-SD) 45.6 fL      37

.0-51.0    N             

 

             PLATELET COUNT (test code = PLT) 428 K/mm3    150-450      N       

      

 

             MEAN PLATELET VOLUME (test code = MPV) 9.8 fL       6.7-11.0     N 

            

 

             NEUTROPHIL % (test code = NT%) 68.2 %       39.0-69.0    N         

    

 

             IMMATURE GRANULOCYTE % (test code = IG%) 0.5 %        0.0-5.0      

N             

 

             LYMPHOCYTE % (test code = LY%) 17.1 %       25.0-55.0    L         

    

 

             MONOCYTE % (test code = MO%) 10.3 %       0.0-10.0     H           

  

 

             EOSINOPHIL % (test code = EO%) 3.4 %        0.0-5.0      N         

    

 

             BASOPHIL % (test code = BA%) 0.5 %        0.0-1.0      N           

  

 

             NUCLEATED RBC % (test code = NRBC%) 0.0 %        0-0          N    

         

 

             NEUTROPHIL # (test code = NT#) 6.44 K/mm3   1.8-7.7      N         

    

 

             IMMATURE GRANULOCYTE # (test code = IG#) 0.05 x10 3/uL 0-0.03      

 H             

 

             LYMPHOCYTE # (test code = LY#) 1.62 K/mm3   1.0-5.0      N         

    

 

             MONOCYTE # (test code = MO#) 0.97 K/mm3   0-0.8        H           

  

 

             EOSINOPHIL # (test code = EO#) 0.32 K/mm3   0.0-0.5      N         

    

 

             BASOPHIL # (test code = BA#) 0.05 K/mm3   0.0-0.2      N           

  

 

             NUCLEATED RBC # (test code = NRBC#) 0.00 K/mm3   0.0-0.1      N    

         

 

             MANUAL DIFF REQUIRED (test code = MDIFF) NO, ONLY SCAN NEEDED      

                      





DIFFERENTIAL [id number] 06:01:00* 



             Test Item    Value        Reference Range Interpretation Comments

 

             STAIN ACCEPTABILITY (test code = STN ACCEPTABLE)                   

                      

 

             CABOT RINGS (test code = CAB)                                      

   

 

             MORPHOLOGY COMMENT (test code = MOC)                               

          

 

             PLATELET ESTIMATE (test code = PLTEST)                             

            

 

             PLATELET MORPHOLOGY (test code = PLTMORPH)                         

                





- XR CHEST 1 -85-39 09:24:00 FAX: Payal Wong [phone]
   [place]: B   : [facility] FAX: Pito Berger DO    [phone]   
------------------------------------------------------------------------------- 
Name:   SOLO SANCHEZ                   [facility]                     :
1940  Age/S: 78/F           [address]                Unit #: 
[id number]      Loc: [id number]       [place]  [address]              Phys: 
Payal Wright MD                                                Acct: 
[id number] Dis Date:               Status: ADM IN                             
   PHONE #: [phone]     Exam Date:     2019                 
 FAX #: [phone]     Reason: sob                                            
   EXAMS:                                               CPT CODE:      [id number]
XR CHEST 1 V                               [id number]                    HISTORY: 
Shortness of breath.               COMPARISON: 2019.               No 
acute infiltrates, effusion or congestion is noted.               Cardiomegaly. 
                IMPRESSION:                    No acute infiltrates, effusion or
congestion.          ** Electronically Signed by LEOPOLDO De La Cruz on 2019
at 0924 **                      Reported and signed by: Eagle De La Cruz M.D.      
                     CC: Payal Wright MD; Pito Oneill DO                
                                                                                
Technologist: Cihoma THOMAS(R); STUDENT TECHNOLOGIST             Trnscrd 
Date/Time/By: 2019 (924) : By: [email or web address]           Orig Print D/T: S: 
2019 (928)                         PAGE  1                       Signed 
Report                               THYROID PROFILE [id number] 06:38:00* 



             Test Item    Value        Reference Range Interpretation Comments

 

             T3 UPTAKE (test code = T3UP) 37.0 %       30.0-40.0    N           

  

 

             T4 (THYROXINE) (test code = T4) 12.8 ug/dL   4.5-13.9     N        

     

 

             T7 (FREE THYROXINE INDEX) (test code = T7) 4.73 FTI     1.3-5.1    

  N             

 

             THYROID STIMULATING HORMONE (test code = TSH) 0.872 uIU/mL 0.36-3.7

4    N            TSH 

REFERENCE RANGES:  EUTHYROID:     0.35 - 4.3 mIU/mL                       HYPO  
  :     > 5.5      mIU/mL                       HYPER    :     < 0.35     mIU/mL





COMPREHENSIVE METABOLIC [id number] 06:32:00* 



             Test Item    Value        Reference Range Interpretation Comments

 

             SODIUM (test code = NA) 141 mmol/L   136-145      N             

 

             POTASSIUM (test code = K) 3.6 mmol/L   3.5-5.1      N             

 

             CHLORIDE (test code = CL) 106.0 mmol/L        N             

 

             CARBON DIOXIDE (test code = CO2) 28.0 mmol/L  21-32        N       

      

 

             ANION GAP (test code = GAP) 10.6         10-20        N            

 

 

             GLUCOSE (test code = GLU) 88 mg/dL            N             

 

             BLOOD UREA NITROGEN (test code = BUN) 8 mg/dL      7-18         N  

           

 

             GLOMERULAR FILTRATION RATE (test code = GFR) > 60 mL/min  >=60     

                 Estimated GFR by

using Modified MDRD formula.Chronic kidney disease is defined as either kidney 
damageor GFR <60 mL/min/1.73 m2 for >3 months.

 

             CREATININE (test code = CREAT) 0.70 mg/dL   0.55-1.02    N         

   **Note change in reference

range due to change in reagent.**

 

             BUN/CREATININE RATIO (test code = BUN/CREA) 11.4         10-20     

   N             

 

             TOTAL PROTEIN (test code = PROT) 6.8 gram/dL  6.4-8.2      N       

      

 

             ALBUMIN (test code = ALB) 2.9 g/dL     3.4-5.0      L             

 

             GLOBULIN (test code = GLOB) 3.9 gram/dL  2.7-4.2      N            

 

 

             ALBUMIN/GLOBULIN RATIO (test code = A/G) 0.7          0.75-1.50    

L             

 

             CALCIUM (test code = CA) 9.1 mg/dL    8.5-10.1     N             

 

             BILIRUBIN TOTAL (test code = BILT) 0.20 mg/dL   0.0-1.0      N     

        

 

             SGOT/AST (test code = AST) 14 IUnit/L   15-37        L             

 

             SGPT/ALT (test code = ALT) 15 IUnit/L   12-78        N             

 

             ALKALINE PHOSPHATASE TOTAL (test code = ALKP) 133 IUnit/L    

     H            **Note change

in reference range due to change in reagent.**





COMPREHENSIVE METABOLIC [id number] 06:23:00* 



             Test Item    Value        Reference Range Interpretation Comments

 

             SODIUM (test code = NA) 141 mmol/L   136-145      N             

 

             POTASSIUM (test code = K) 3.6 mmol/L   3.5-5.1      N             

 

             CHLORIDE (test code = CL) 106.0 mmol/L        N             

 

             CARBON DIOXIDE (test code = CO2)  mmol/L      21-32                

      

 

             ANION GAP (test code = GAP)              10-20                     

 

 

             GLUCOSE (test code = GLU)  mg/dL                            

 

             BLOOD UREA NITROGEN (test code = BUN)  mg/dL       7-18            

           

 

             GLOMERULAR FILTRATION RATE (test code = GFR)  mL/min      >=60     

                  

 

             CREATININE (test code = CREAT)  mg/dL       0.55-1.02              

    

 

             BUN/CREATININE RATIO (test code = BUN/CREA)              10-20     

                 

 

             TOTAL PROTEIN (test code = PROT)  gram/dL     6.4-8.2              

      

 

             ALBUMIN (test code = ALB)  g/dL        3.4-5.0                    

 

             GLOBULIN (test code = GLOB)  gram/dL     2.7-4.2                   

 

 

             ALBUMIN/GLOBULIN RATIO (test code = A/G)              0.75-1.50    

              

 

             CALCIUM (test code = CA)  mg/dL       8.5-10.1                   

 

             BILIRUBIN TOTAL (test code = BILT)  mg/dL       0.0-1.0            

        

 

             SGOT/AST (test code = AST)  IUnit/L     15-37                      

 

             SGPT/ALT (test code = ALT)  IUnit/L     12-78                      

 

             ALKALINE PHOSPHATASE TOTAL (test code = ALKP)  IUnit/L       

                   





BASIC METABOLIC [id number] 05:10:00* 



             Test Item    Value        Reference Range Interpretation Comments

 

             SODIUM (test code = NA) 141 mmol/L   136-145      N             

 

             POTASSIUM (test code = K) 4.1 mmol/L   3.5-5.1      N             

 

             CHLORIDE (test code = CL) 106.0 mmol/L        N             

 

             CARBON DIOXIDE (test code = CO2) 26.0 mmol/L  21-32        N       

      

 

             ANION GAP (test code = GAP) 13.1         10-20        N            

 

 

             GLUCOSE (test code = GLU) 83 mg/dL            N             

 

             BLOOD UREA NITROGEN (test code = BUN) 7 mg/dL      7-18         N  

           

 

             GLOMERULAR FILTRATION RATE (test code = GFR) > 60 mL/min  >=60     

                 Estimated GFR by

using Modified MDRD formula.Chronic kidney disease is defined as either kidney 
damageor GFR <60 mL/min/1.73 m2 for >3 months.

 

             CREATININE (test code = CREAT) 0.80 mg/dL   0.55-1.02    N         

   **Note change in reference

range due to change in reagent.**

 

             BUN/CREATININE RATIO (test code = BUN/CREA) 8.8          10-20     

   L             

 

             CALCIUM (test code = CA) 8.6 mg/dL    8.5-10.1     N             





BASIC METABOLIC [id number] 04:56:00* 



             Test Item    Value        Reference Range Interpretation Comments

 

             SODIUM (test code = NA) 141 mmol/L   136-145      N             

 

             POTASSIUM (test code = K) 4.1 mmol/L   3.5-5.1      N             

 

             CHLORIDE (test code = CL) 106.0 mmol/L        N             

 

             CARBON DIOXIDE (test code = CO2)  mmol/L      21-32                

      

 

             ANION GAP (test code = GAP)              10-20                     

 

 

             GLUCOSE (test code = GLU)  mg/dL                            

 

             BLOOD UREA NITROGEN (test code = BUN)  mg/dL       7-18            

           

 

             GLOMERULAR FILTRATION RATE (test code = GFR)  mL/min      >=60     

                  

 

             CREATININE (test code = CREAT)  mg/dL       0.55-1.02              

    

 

             BUN/CREATININE RATIO (test code = BUN/CREA)              10-20     

                 

 

             CALCIUM (test code = CA)  mg/dL       8.5-10.1                   





CBC W/AUTO [id number] 04:41:00* 



             Test Item    Value        Reference Range Interpretation Comments

 

             WHITE BLOOD CELL (test code = WBC) 11.5 K/mm3   4.5-12.5     N     

        

 

             RED BLOOD CELL (test code = RBC) 3.93 mill/mm3 3.7-5.2      N      

       

 

             HEMOGLOBIN (test code = HGB) 10.9 gram/dL 11.5-15.5    L           

  

 

             HEMATOCRIT (test code = HCT) 34.8 %       36.0-46.0    L           

  

 

             MEAN CELL VOLUME (test code = MCV) 88.5 fL      80-98        N     

        

 

             MEAN CELL HGB (test code = MCH) 27.7 picogram 27.0-33.0    N       

      

 

             MEAN CELL HGB CONCETRATION (test code = MCHC) 31.3 gram/dL 33.0-36.

0    L             

 

             RED CELL DISTRIBUTION WIDTH (test code = RDW) 14.0 %       11.6-16.

2    N             

 

             RED CELL DISTRIBUTION WIDTH SD (test code = RDW-SD) 45.8 fL      37

.0-51.0    N             

 

             PLATELET COUNT (test code = PLT) 377 K/mm3    150-450      N       

      

 

             MEAN PLATELET VOLUME (test code = MPV) 9.4 fL       6.7-11.0     N 

            

 

             NEUTROPHIL % (test code = NT%) 61.6 %       39.0-69.0    N         

    

 

             IMMATURE GRANULOCYTE % (test code = IG%) 0.3 %        0.0-5.0      

N             

 

             LYMPHOCYTE % (test code = LY%) 17.9 %       25.0-55.0    L         

    

 

             MONOCYTE % (test code = MO%) 17.1 %       0.0-10.0     H           

  

 

             EOSINOPHIL % (test code = EO%) 2.3 %        0.0-5.0      N         

    

 

             BASOPHIL % (test code = BA%) 0.8 %        0.0-1.0      N           

  

 

             NUCLEATED RBC % (test code = NRBC%) 0.0 %        0-0          N    

         

 

             NEUTROPHIL # (test code = NT#) 7.07 K/mm3   1.8-7.7      N         

    

 

             IMMATURE GRANULOCYTE # (test code = IG#) 0.04 x10 3/uL 0-0.03      

 H             

 

             LYMPHOCYTE # (test code = LY#) 2.06 K/mm3   1.0-5.0      N         

    

 

             MONOCYTE # (test code = MO#) 1.97 K/mm3   0-0.8        H           

  

 

             EOSINOPHIL # (test code = EO#) 0.26 K/mm3   0.0-0.5      N         

    

 

             BASOPHIL # (test code = BA#) 0.09 K/mm3   0.0-0.2      N           

  

 

             NUCLEATED RBC # (test code = NRBC#) 0.00 K/mm3   0.0-0.1      N    

         





- CT ABD PELVIS W WO [id number] 19:03:00  Name: SOLO SANCHEZ              
     [facility]                     : 1940 Age/S: 78  / F         
[address] [facility]                Unit #: [id number]     Loc:               
[place]  [address]              Phys: Constantino Quiros MD                         
                           Acct: [id number]  Dis Date:               Status: 
ADM IN                                  PHONE #: [phone]     Exam Date: 
2019  8371                     FAX #: [phone]      Reason: PAIN      
                                         EXAMS:                                 
             CPT CODE:      [id number] CT ABD PELVIS W WO CONT                   
[address]                    HISTORY: Abdominal pain               TECHNIQUE:  
Noncontrast 5 mm axial CT images were acquired through the       abdomen/pelvis.
Immediate and delayed 5 mm axial CT images were       subsequently acquired 
through the abdomen and pelvis after IV       administration of 100 mL of 
Isovue-370 contrast. Sagittal and coronal       reformatted images were 
generated. Automated exposure control for dose       reduction.               
COMPARISON: 3/28/19               FINDINGS:                Cholecystectomy. 
Liver, pancreas, spleen, adrenal glands, and kidneys       are unremarkable.    
          Limited evaluation the GI tract without oral contrast. Stomach, small 
     bowel, and colon are unremarkable. Persistent focal omental       infla
mmatory stranding anterior to the gastric antrum.               No free air or f
ree fluid. No lymphadenopathy. Aortoiliac       atherosclerotic vascular calcifi
cation without aneurysm.               Urinary bladder is unremarkable. Hysterec
kristina. Trace pelvic free       fluid.               Lumbar levoscoliosis. Degener
ative changes of the spine, sacroiliac       joints, and hips. Chronic ununited 
right superior right pubic ramus       fracture. Right proximal femur internal f
ixation.                         IMPRESSION:                    No significant i
nterval change. Persistent focal omental inflammatory         stranding anterior
to the gastric antrum. No organized fluid         collection. No intraperitoneal
free air.          ** Electronically Signed by Ann Marie Carey D.O. on 2019 at 
1903 **                      Reported and signed by: Ann Marie Carey D.O.          PA
GE  1                       Signed Report                    (CONTINUED)   Name:
LAURASOLOALMITA NARAYAN                    [facility]                     :  Age/S: 78  / F          [facility]                Unit #: [id number] 
   Loc:               [place]  [address]              Phys: Constantino Quiros MD   
                                                 Acct: [id number]  Dis Date:  
            Status: ADM IN                                  PHONE #: [phone]
[phone]     Exam Date: 2019  1745                     FAX #: [phone]    
 Reason: PAIN                                                EXAMS:             
                                 CPT CODE:      [id number] CT ABD PELVIS W WO C
ONT                    [address]               <Continued>                          
            CC: Payal Wright MD; Constantino Quiros MD; Pito Oneill DO        
                                                                       
Technologist:Dali Lai,RT(R),CT            CTDI:        DLP:        Trnscb 
Date/Time: 2019 () [id number]                       Orig Print D/T: S:
2019 ()       CTDI:          DLP:          PAGE  2                    
  Signed Report                               - CT CHEST W/[id number] 
18:53:00  Name: SOLO SANCHEZ                    [facility]                
    : 1940 Age/S: 78  / F         [address]                Unit 
#: [id number]     Loc:               [place]  [address]              Phys: 
Constantino Quiros MD                                                     Acct: 
[id number]  Dis Date:               Status: ADM IN                            
     PHONE #: [phone]     Exam Date: 2019  174                     
FAX #: [phone]      Reason: PAIN                                           
    EXAMS:                                               CPT CODE:      
[id number] CT CHEST W/CONTRAST                        [address]                    
HISTORY: PAIN               TECHNIQUE:  5 mm axial CT images were obtained 
through the chest       before and after IV administration of 100 mL of Isovue-
370 contrast.       Automated exposure control for dose reduction.              
 COMPARISON: Chest x-ray 3/25/19                FINDINGS:                Lungs: 
No airspace consolidation. Small right pleural effusion.       Central airways 
are patent.               Cardiovascular: Normal heart size. No pericardial 
effusion. Carotid       artery and thoracic aortic vascular calcification. No 
thoracic aortic       aneurysm or dissection. Normal caliber pulmonary arteries.
              Mediastinum: No lymphadenopathy. Visualized thyroid is 
unremarkable.       Normal esophagus.               Included upper abdomen: 
Please refer to abdominal CT report.               Bones and superficial soft 
tissues: Degenerative changes of the spine.                         IMPRESSION: 
                  Small right pleural effusion. No airspace consolidation.      
             ** Electronically Signed by Ann Marie Carey D.O. on 2019 at 1853 **
                     Reported and signed by: Ann Marie Carey D.O.          CC: 
Payal Wright MD; Constantino Quiros MD; Pito Oneill DO                        
                                                       Technologist:Dali Lai,RT(R),CT            CTDI:        DLP:        Trnscb Date/Time: 2019 
() [email or web address]                       Orig Print D/T: S: 2019 ()    
  CTDI:          DLP:          PAGE  1                       Signed Report      
                        - XR ABDOMEN [id number] 17:48:00 FAX: Payal Wong [phone]    [place]: B   St: [facility] FAX: Pito Berger DO    [phone]   ----------------------------------------
---------------------------------------  Name:   SOLO SANCHEZ                 
 [facility]                     : 1940  Age/S: 78/F           [address]                Unit #: [id number]      Loc: [id number]       [place]  [address]              Phys: Payal Wright MD                               
                Acct: [id number] Dis Date:               Status: ADM IN       
                         PHONE #: [phone]     Exam Date:     2019    
1736                   FAX #: [phone]     Reason: PAIN                     
                         EXAMS:                                               
CPT CODE:      [id number] XR ABDOMEN 2V                              [address]       
            HISTORY: PAIN               TECHNIQUE  Supine and upright AP abdomen
x-ray               FINDINGS: Nonspecific nonobstructed bowel gas pattern. No 
free       intraperitoneal air. No intra-abdominal mass effect. No abnormal     
 calcifications are observed. Cholecystectomy clips. Lumbar spondylosis       
and levoscoliosis. Degenerative changes of the sacroiliac joints and       hips.
                IMPRESSION:                    Nonobstructive bowel gas pattern.
No free air.          ** Electronically Signed by Ann Marie Carey D.O. on 2019 
at 1743 **                      Reported and signed by: Ann Marie Carey D.O.          
               CC: Payal Wright MD; Pito Oneill
echnologist: Jazmyn Krishnamurthy                                    Trnscrd Date
/Time/By: 2019 (0020) : By: [email or web address]          Orig Print D/T: S: 
019 (5455)                         PAGE  1                       Signed Report  
                            CBC W/AUTO [id number] 06:21:00* 



             Test Item    Value        Reference Range Interpretation Comments

 

             WHITE BLOOD CELL (test code = WBC) 7.0 K/mm3    4.5-12.5     N     

        

 

             RED BLOOD CELL (test code = RBC) 3.78 mill/mm3 3.7-5.2      N      

       

 

             HEMOGLOBIN (test code = HGB) 10.5 gram/dL 11.5-15.5    L           

  

 

             HEMATOCRIT (test code = HCT) 33.5 %       36.0-46.0    L           

  

 

             MEAN CELL VOLUME (test code = MCV) 88.6 fL      80-98        N     

        

 

             MEAN CELL HGB (test code = MCH) 27.8 picogram 27.0-33.0    N       

      

 

             MEAN CELL HGB CONCETRATION (test code = MCHC) 31.3 gram/dL 33.0-36.

0    L             

 

             RED CELL DISTRIBUTION WIDTH (test code = RDW) 14.0 %       11.6-16.

2    N             

 

             RED CELL DISTRIBUTION WIDTH SD (test code = RDW-SD) 45.6 fL      37

.0-51.0    N             

 

             PLATELET COUNT (test code = PLT) 400 K/mm3    150-450      N       

      

 

             MEAN PLATELET VOLUME (test code = MPV) 10.0 fL      6.7-11.0     N 

            

 

             NEUTROPHIL % (test code = NT%) 62.5 %       39.0-69.0    N         

    

 

             IMMATURE GRANULOCYTE % (test code = IG%) 0.3 %        0.0-5.0      

N             

 

             LYMPHOCYTE % (test code = LY%) 21.1 %       25.0-55.0    L         

    

 

             MONOCYTE % (test code = MO%) 12.6 %       0.0-10.0     H           

  

 

             EOSINOPHIL % (test code = EO%) 3.1 %        0.0-5.0      N         

    

 

             BASOPHIL % (test code = BA%) 0.4 %        0.0-1.0      N           

  

 

             NUCLEATED RBC % (test code = NRBC%) 0.0 %        0-0          N    

         

 

             NEUTROPHIL # (test code = NT#) 4.37 K/mm3   1.8-7.7      N         

    

 

             IMMATURE GRANULOCYTE # (test code = IG#) 0.02 x10 3/uL 0-0.03      

 N             

 

             LYMPHOCYTE # (test code = LY#) 1.48 K/mm3   1.0-5.0      N         

    

 

             MONOCYTE # (test code = MO#) 0.88 K/mm3   0-0.8        H           

  

 

             EOSINOPHIL # (test code = EO#) 0.22 K/mm3   0.0-0.5      N         

    

 

             BASOPHIL # (test code = BA#) 0.03 K/mm3   0.0-0.2      N           

  

 

             NUCLEATED RBC # (test code = NRBC#) 0.00 K/mm3   0.0-0.1      N    

         

 

             MANUAL DIFF REQUIRED (test code = MDIFF) NO                        

              





BASIC METABOLIC [id number] 05:48:00* 



             Test Item    Value        Reference Range Interpretation Comments

 

             SODIUM (test code = NA) 144 mmol/L   136-145      N             

 

             POTASSIUM (test code = K) 3.9 mmol/L   3.5-5.1      N             

 

             CHLORIDE (test code = CL) 109.0 mmol/L        H             

 

             CARBON DIOXIDE (test code = CO2) 28.0 mmol/L  21-32        N       

      

 

             ANION GAP (test code = GAP) 10.9         10-20        N            

 

 

             GLUCOSE (test code = GLU) 90 mg/dL            N             

 

             BLOOD UREA NITROGEN (test code = BUN) 8 mg/dL      7-18         N  

           

 

             GLOMERULAR FILTRATION RATE (test code = GFR) > 60 mL/min  >=60     

                 Estimated GFR by

using Modified MDRD formula.Chronic kidney disease is defined as either kidney 
damageor GFR <60 mL/min/1.73 m2 for >3 months.

 

             CREATININE (test code = CREAT) 0.60 mg/dL   0.55-1.02    N         

   **Note change in reference

range due to change in reagent.**

 

             BUN/CREATININE RATIO (test code = BUN/CREA) 13.3         10-20     

   N             

 

             CALCIUM (test code = CA) 8.9 mg/dL    8.5-10.1     N             





BASIC METABOLIC [id number] 05:42:00* 



             Test Item    Value        Reference Range Interpretation Comments

 

             SODIUM (test code = NA) 144 mmol/L   136-145      N             

 

             POTASSIUM (test code = K) 3.9 mmol/L   3.5-5.1      N             

 

             CHLORIDE (test code = CL) 109.0 mmol/L        H             

 

             CARBON DIOXIDE (test code = CO2)  mmol/L      21-32                

      

 

             ANION GAP (test code = GAP)              10-20                     

 

 

             GLUCOSE (test code = GLU)  mg/dL                            

 

             BLOOD UREA NITROGEN (test code = BUN)  mg/dL       7-18            

           

 

             GLOMERULAR FILTRATION RATE (test code = GFR)  mL/min      >=60     

                  

 

             CREATININE (test code = CREAT)  mg/dL       0.55-1.02              

    

 

             BUN/CREATININE RATIO (test code = BUN/CREA)              10-20     

                 

 

             CALCIUM (test code = CA)  mg/dL       8.5-10.1                   





- CT ABD PELVIS W/[id number] 07:25:00  Name: LAURASOLO HELENE                 
  [facility]                     : 1940 Age/S: 78  / F         [address]                Unit #: [id number]     Loc:               [place]  [address]              Phys: Constantino Quiros MD                                    
                Acct: [id number]  Dis Date:               Status: ADM IN      
                           PHONE #: [phone]     Exam Date: 2019  0050
                    FAX #: [phone]      Reason: possible perforated ulcer  
                        EXAMS:                                               CPT
CODE:      [id number] CT ABD PELVIS W/CONT                       [address]           
        EXAM: CT of the abdomen and pelvis with contrast;               
INFORMATION: Abdominal pain, perforated ulcer;               TECHNIQUE AND 
FINDINGS:       CT dose reduction protocol;       5 mm cuts through the abdomen 
and pelvis during and after intravenous       infusion of contrast material.    
  Similar to the recent study from 2019 there is thickening of       
the wall of the gastric antrum and there is significant stranding of       
omental fat tissue between anterior abdominal wall and the stomach.        No 
evidence of free air and no localized fluid collection in this       region.    
  Homogeneous enhancement of the liver; no defines small slightly       
hypodense splenic lesions; status post cholecystectomy; unremarkable       
pancreas, adrenal glands and kidneys.       No abnormalities of small bowel and 
colon; no pelvic mass lesions.       Lung bases are clear.                 
IMPRESSION:         1.  No significant change compared with the recent study 
from 2019.         2.  Inflammatory changes involving the 
omentum anterior to the gastric         antrum with mild wall thickening of the 
antrum.  These findings are         suspicious for a contained perforation of a 
gastric ulcer without         evidence of abscess formation or pneumoperitoneum.
                  ** Electronically Signed by LEOPOLDO Dumont **           
**             on 2019 at 0725             **                      Rep
orted and signed by: William Dumont M.D.            CC: Payal Wright MD; 
Constantino Quiros MD; Pito Oneill DO                                              
                                 Technologist:MELISSA CHANEL        C
TDI:        DLP:        Trnscb Date/Time: 2019 (725) tSAWW           
            Orig Print D/T: S: 2019 (0729)       CTDI:          DLP:      
   PAGE  1                       Signed Report                               - 
XR ELBOW 2 VIEWS [id number] 10:52:00 FAX: Ezequiel Wong MD 
[phone]    [place]:    St: [facility] FAX: Payal Wong MD 
[phone]    FAX: iPto Berger DO    [phone]   
------------------------------------------------------------------------------- 
Name:   SOLO SANCHEZ                   [facility]                     :
1940  Age/S: 78/F           [address]                Unit #: 
[id number]      Loc: [address]       [place]  [address]              Phys: 
Ezequiel Wright MD                                                Acct: [id number]
[id number] Dis Date:               Status: ADM IN                                 
ONE #: [phone]     Exam Date:     2019     0915                   FAX
#: [phone]     Reason: FALL ON ELBOW                                      
EXAMS:                                               CPT CODE:      [id number] XR
ELBOW 2 VIEWS RT                        [id number]                    HISTORY: Fall 
and pain.               COMPARISON: None available.               2 views of the
right elbow:               No acute fracture or dislocation. Joint spaces are p
reserved. No       erosive or destructive changes are noted. Triceps enthesophyt
e. Bone       mineralization and soft tissues are normal.                 IMPRES
OSMANY:                   No acute fracture or dislocation. Joint spaces are prese
rved. Marginal         osteophytes from the medial humeral condyle. Biceps enthe
sophyte.          ** Electronically Signed by LEOPOLDO De La Cruz on 2019 at
1052 **                      Reported and signed by: Eagle De La Cruz M.D.         
              CC: Ezequiel Wright MD; Payal Wright MD; Pito Oneill  DO 
                                                                         Tech
nologist: Silvia Mcdonald RT(R)                                   Trnscrd Date/Ti
me/By: 2019 (8892) : By: [email or web address]           Orig Print D/T: S: 2019
(8070)                         PAGE  1                       Signed Report      
                        - CT ABD PELVIS [id number] 18:15:00  Name: 
SOLO SANCHEZ                    [facility]                     : 
1940 Age/S: 78  / F         [address]                Unit #: [id number]
[id number]     Loc:               [place]  [address]              Phys: Hema Florez DO                                                    Acct: [id number]  Dis
Date:               Status: REG ER                                  PHONE #: [phone]
[phone]     Exam Date: 2019                     FAX #: [phone]
[phone]      Reason: pain                                                EXAMS:     
                                         CPT CODE:      [id number] CT ABD PELVIS 
W/CONT                       38673                            REASON FOR EXAM: 
pain               EXAM ORDER DATE: 3/25/2019 12:46 PM               Ordering MAugie BORJA: Hema Florez DO               PROCEDURE:  - CT ABD PELVIS W/CONT  contrast-
enhanced axial CT images       were acquired through the abdomen/pelvis at 5 mm 
intervals.  Sagittal       and coronal reformatted images were generated.  Autom
ated exposure       control was utilized for this reduction.               MI
RISON: 2018.               FINDINGS:        The visualized lung base
s are clear with no evidence of suspicious       lung nodules or masses. There i
s no evidence of pleural or pericardial       effusion. A focus of calcification
is seen at the dome of the liver,       likely from prior granulomatous infecti
on.               No focal lesions are seen in the liver. Prior cholecystectomy.
              Nonspecific small hypodense foci are seen in the spleen, unchanged
      when compared to the prior study.       The adrenals and pancreas appears 
normal.               No dilated bowel loops are seen. There is no evidence of 
free fluid or       air in the abdomen or pelvis.                A focus of fat 
stranding is seen in the mesentery of the right upper       quadrant in the gas
trohepatic space (series 2 image #34). The adjacent       stomach wall shows foc
al pooling of the contrast concerning for a       ulcer.               Symmetric
excretion of contrast is seen from both kidneys with no       evidence of focal 
lesions, hydronephrosis, or nephrolithiasis.               No retroperitoneal, 
pelvic, or inguinal adenopathy seen.               The urinary bladder appears n
ormal. Prior hysterectomy.               Extensive degenerative changes are seen
in the spine. Internal       fixation of the right proximal femur is seen. Prior
fractures of the       pelvis.             PAGE  1                       Signed 
Report                    (CONTINUED)   Name: SOLO SANCHEZ                    
[facility]                     : 1940 Age/S: 78  / F         [address] 
[facility]                Unit #: [id number]     Loc:               [place],  
[place]  [address]              Phys: Hema Florez DO                                   
                Acct: [id number]  Dis Date:               Status: REG ER      
                           PHONE #: [phone]     Exam Date: 2019  1643
                    FAX #: [phone]      Reason: pain                       
                        EXAMS:                                               CPT
CODE:      [id number] CT ABD PELVIS W/CONT                       66597           
   <Continued>          IMPRESSION:         Focal fat stranding is seen in the 
gastrohepatic space with adjacent         pooling of contrast in the gastric 
wall suggesting an ulcer.         Contained/impending perforation from a gastric
ulcer is suspected. The         other possibility is fat necrosis. Recommend 
further evaluation with         upper GI endoscopy.                             
         ** Electronically Signed by Jeffrey Lopes M.D. **           **      
       on 2019 at 1815             **                      Reported and 
signed by: Jeffrey Lopes M.D.                             CC: Hema Florez DO                                                                              
                                         Technologist:Dominique Amaya RT(R); 
IRWIN WEISS  CTDI:        DLP:        Trnscb Date/Time: 2019 () 
KarenR.PB10                       Orig Print D/T: S: 2019 ()       
CTDI:          DLP:          PAGE  2                       Signed Report        
                      - CT HEAD/BRAIN W/O [id number] 17:20:00  Name: 
SOLO SANCHEZ                    [facility]                     : 
1940 Age/S: 78  / F         [address] [facility]                Unit #: [id number]
[id number]     Loc:               [place]  [address]              Phys: Hema Florez DO                                                    Acct: [id number]  Dis
Date:               Status: REG ER                                  PHONE #: [phone]
[phone]     Exam Date: 2019  1707                     FAX #: [phone]
[phone]      Reason: fall                                                EXAMS:     
                                         CPT CODE:      [id number] CT HEAD/BRAIN 
W/O CONT                     [address]                    HISTORY:  fall            
  TECHNIQUE: Noncontrast 2.5 mm axial CT of the head. Examination       acquired
within 24 hours of arrival. Automated exposure control for       dose reduction.
              COMPARISON: MRI and CT from 2018.               FINDINGS:     
         No acute hemorrhage. No intracranial mass, mass effect, or midline     
 shift. No CT evidence of acute infarct. Gray-white matter       differentiation
is preserved. Stable age-related changes of prominent       sulci and jermaine
tricles. No hydrocephalus. No extra-axial fluid       collection. Intravascular 
IV contrast is visualized and the [place]       [place] appears patent.        
      Visualized paranasal sinuses are clear. Mastoid air cells and middle      
ear cavities are clear. Bilateral orbital lens implants.. Calvarium       and 
skull base are intact.                         IMPRESSION:                   No 
acute intracranial abnormality.                   ** Electronically Signed by Paul Lopes M.D. **           **              on 2019 at 1720          
  **                      Reported and signed by: Jeffrey Lopes M.D.         
   CC: Pito Oneill  DO; Hema Florez  DO                                      
                                                              Technologist:Dominique Amaya RT(R); IRWIN WEISS  CTDI:        DLP:        Trnscb Date/Time: 2019 
(1720) t.SDR.PB10/t.SDR.PB10            Orig Print D/T: S: 2019 (5260)    
  CTDI:          DLP:          PAGE  1                       Signed Report      
                        BASIC METABOLIC [id number] 16:01:00* 



             Test Item    Value        Reference Range Interpretation Comments

 

             SODIUM (test code = NA) 135 mmol/L   136-145      L             

 

             POTASSIUM (test code = K) 3.9 mmol/L   3.5-5.1      N             

 

             CHLORIDE (test code = CL) 101.0 mmol/L        N             

 

             CARBON DIOXIDE (test code = CO2) 28.0 mmol/L  21-32        N       

      

 

             ANION GAP (test code = GAP) 9.9          10-20        L            

 

 

             GLUCOSE (test code = GLU) 76 mg/dL            N             

 

             BLOOD UREA NITROGEN (test code = BUN) 25 mg/dL     7-18         H  

           

 

             GLOMERULAR FILTRATION RATE (test code = GFR) 54 mL/min    >=60     

                 Estimated GFR by 

using Modified MDRD formula.Chronic kidney disease is defined as either kidney 
damageor GFR <60 mL/min/1.73 m2 for >3 months.

 

             CREATININE (test code = CREAT) 1.00 mg/dL   0.55-1.02    N         

   **Note change in reference

range due to change in reagent.**

 

             BUN/CREATININE RATIO (test code = BUN/CREA) 25.0         10-20     

   H             

 

             CALCIUM (test code = CA) 9.0 mg/dL    8.5-10.1     N             





HEPATIC FUNCTION [id number] 16:01:00* 



             Test Item    Value        Reference Range Interpretation Comments

 

             TOTAL PROTEIN (test code = PROT) 7.8 gram/dL  6.4-8.2      N       

      

 

             ALBUMIN (test code = ALB) 3.6 g/dL     3.4-5.0      N             

 

             GLOBULIN (test code = GLOB) 4.2 gram/dL  2.7-4.2      N            

 

 

             ALBUMIN/GLOBULIN RATIO (test code = A/G) 0.9          0.75-1.50    

N             

 

             BILIRUBIN TOTAL (test code = BILT) 0.40 mg/dL   0.0-1.0      N     

        

 

             BILIRUBIN DIRECT (test code = BILD) 0.12 mg/dL   0.0-0.20     N    

         

 

             SGOT/AST (test code = AST) 29 IUnit/L   15-37        N             

 

             SGPT/ALT (test code = ALT) 32 IUnit/L   12-78        N             

 

             ALKALINE PHOSPHATASE TOTAL (test code = ALKP) 188 IUnit/L    

     H            **Note change

in reference range due to change in reagent.**





[id number] 16:01:00* 



             Test Item    Value        Reference Range Interpretation Comments

 

             LIPASE (test code = LIP) 168 U/L      73.0-393.0   N             





[id number] 16:01:00* 



             Test Item    Value        Reference Range Interpretation Comments

 

             TROPONIN-I (test code = TROPI) <0.015 ng/mL 0-0.045      N         

    





CBC W/O [id number] 16:01:00* 



             Test Item    Value        Reference Range Interpretation Comments

 

             WHITE BLOOD CELL (test code = WBC) 12.3 K/mm3   4.5-12.5     N     

        

 

             RED BLOOD CELL (test code = RBC) 3.91 mill/mm3 3.7-5.2      N      

       

 

             HEMOGLOBIN (test code = HGB) 10.7 gram/dL 11.5-15.5    L           

  

 

             HEMATOCRIT (test code = HCT) 35.3 %       36.0-46.0    L           

  

 

             MEAN CELL VOLUME (test code = MCV) 90.3 fL      80-98        N     

        

 

             MEAN CELL HGB (test code = MCH) 27.4 picogram 27.0-33.0    N       

      

 

             MEAN CELL HGB CONCETRATION (test code = MCHC) 30.3 gram/dL 33.0-36.

0    L             

 

             RED CELL DISTRIBUTION WIDTH (test code = RDW) 14.6 %       11.6-16.

2    N             

 

             PLATELET COUNT (test code = PLT) 444 K/mm3    150-450      N       

      

 

             MEAN PLATELET VOLUME (test code = MPV) 10.1 fL      6.7-11.0     N 

            





BASIC METABOLIC [id number] 15:57:00* 



             Test Item    Value        Reference Range Interpretation Comments

 

             SODIUM (test code = NA) 135 mmol/L   136-145      L             

 

             POTASSIUM (test code = K) 3.9 mmol/L   3.5-5.1      N             

 

             CHLORIDE (test code = CL) 101.0 mmol/L        N             

 

             CARBON DIOXIDE (test code = CO2)  mmol/L      21-32                

      

 

             ANION GAP (test code = GAP)              10-20                     

 

 

             GLUCOSE (test code = GLU)  mg/dL                            

 

             BLOOD UREA NITROGEN (test code = BUN)  mg/dL       7-18            

           

 

             GLOMERULAR FILTRATION RATE (test code = GFR)  mL/min      >=60     

                  

 

             CREATININE (test code = CREAT)  mg/dL       0.55-1.02              

    

 

             BUN/CREATININE RATIO (test code = BUN/CREA)              10-20     

                 

 

             CALCIUM (test code = CA)  mg/dL       8.5-10.1                   





HEPATIC FUNCTION [id number] 15:57:00* 



             Test Item    Value        Reference Range Interpretation Comments

 

             TOTAL PROTEIN (test code = PROT)  gram/dL     6.4-8.2              

      

 

             ALBUMIN (test code = ALB)  g/dL        3.4-5.0                    

 

             GLOBULIN (test code = GLOB)  gram/dL     2.7-4.2                   

 

 

             ALBUMIN/GLOBULIN RATIO (test code = A/G)              0.75-1.50    

              

 

             BILIRUBIN TOTAL (test code = BILT)  mg/dL       0.0-1.0            

        

 

             BILIRUBIN DIRECT (test code = BILD)  mg/dL       0.0-0.20          

         

 

             SGOT/AST (test code = AST)  IUnit/L     15-37                      

 

             SGPT/ALT (test code = ALT)  IUnit/L     12-78                      

 

             ALKALINE PHOSPHATASE TOTAL (test code = ALKP)  IUnit/L       

                   





[id number] 15:57:00* 



             Test Item    Value        Reference Range Interpretation Comments

 

             LIPASE (test code = LIP)  U/L         73.0-393.0                 





[id number] 15:57:00* 



             Test Item    Value        Reference Range Interpretation Comments

 

             TROPONIN-I (test code = TROPI)  ng/mL       0-0.045                

    





URINALYSIS [id number] 15:55:00* 



             Test Item    Value        Reference Range Interpretation Comments

 

             UA COLOR (test code = COLU) YELLOW       YELLOW                    

 

 

             UA APPEARANCE (test code = APPU) CLEAR        CLEAR                

      

 

             UA GLUCOSE DIPSTICK (test code = DGLUU) NEGATIVE mg/dL NEGATIVE    

               

 

             UA BILIRUBIN DIPSTICK (test code = BILU) NEGATIVE mg/dL NEGATIVE   

                

 

             UA KETONE DIPSTICK (test code = KETU) Negative mg/dL NEGATIVE      

             

 

             UA SPECIFIC GRAVITY (test code = SGU) 1.014        1.001-1.035     

           

 

             UA BLOOD DIPSTICK (test code = MAXIMILIAN) Negative     NEGATIVE          

         

 

             UA PH DIPSTICK (test code = EDITH) 5.0          5.0-8.0              

      

 

             UA PROTEIN DIPSTICK (test code = PROU) Negative mg/dL NEGATIVE     

              

 

             UA UROBILINIOGEN DIPSTICK (test code = URO) NEGATIVE mg/dL NEGATIVE

                   

 

             UA NITRITE DIPSTICK (test code = NAHOMY) NEGATIVE     NEGATIVE       

            

 

             UA LEUKOCYTE ESTERASE W REFLEX (test code = LEUUR) NEGATIVE     NEG

ATIVE                   

 

             UA WBC (test code = WBCU) 0-5 #/HPF    0-5                        

 

             UA RBC (test code = RBCU) 0-3 #/HPF    0-5                        

 

             UA BACTERIA (test code = BACU) NONE SEEN #/HPF NONE                

       

 

             UA HYALINE CAST (test code = HYALU) 0-2 #/LPF    0-5               

         

 

             UA MUCUS (test code = MUCU) FEW #/LPF    FEW                       

 





Urine Source? Clean CatchURINALYSIS [id number] 15:43:00* 



             Test Item    Value        Reference Range Interpretation Comments

 

             UA COLOR (test code = COLU) YELLOW       YELLOW                    

 

 

             UA APPEARANCE (test code = APPU) CLEAR        CLEAR                

      

 

             UA GLUCOSE DIPSTICK (test code = DGLUU) NEGATIVE mg/dL NEGATIVE    

               

 

             UA BILIRUBIN DIPSTICK (test code = BILU) NEGATIVE mg/dL NEGATIVE   

                

 

             UA KETONE DIPSTICK (test code = KETU) Negative mg/dL NEGATIVE      

             

 

             UA SPECIFIC GRAVITY (test code = SGU) 1.014        1.001-1.035     

           

 

             UA BLOOD DIPSTICK (test code = MAXIMILIAN) Negative     NEGATIVE          

         

 

             UA PH DIPSTICK (test code = EDITH) 5.0          5.0-8.0              

      

 

             UA PROTEIN DIPSTICK (test code = PROU) Negative mg/dL NEGATIVE     

              

 

             UA UROBILINIOGEN DIPSTICK (test code = URO) NEGATIVE mg/dL NEGATIVE

                   

 

             UA NITRITE DIPSTICK (test code = NAHOMY) NEGATIVE     NEGATIVE       

            

 

             UA LEUKOCYTE ESTERASE W REFLEX (test code = LEUUR) NEGATIVE     NEG

ATIVE                   

 

             UA WBC (test code = WBCU)  per HPF     0-5                        





Urine Source? Clean Catch- XR CHEST 1 -16-22 13:34:00 FAX:         
Hema Florez DO                     [place]: B   St: REG----------
---------------------------------------------------------------------  Name:   SOLO VILLALBA                   [facility]                     : [birth year]  Age/S: 78/F           [address] [facility]                Unit #: [id number]    
 Loc: [place]  [address]              Phys: Hema Florez DO     
                                              Acct: [id number] Dis Date:      
        Status: REG ER                                 PHONE #: [phone]    
Exam Date:     2019     1320                   FAX #: [phone]     
Reason: Abdominal Pain                                     EXAMS:               
                               CPT CODE:      [id number] XR CHEST 1 V            
                  [address]                    HISTORY: Abdominal pain.             
 COMPARISON: 2018.               No acute infiltrates, effusion or 
congestion is noted. Suboptimal       inspiration. Dependent changes.           
   The cardiac and mediastinal silhouette are within normal limits.             
    IMPRESSION:                    No acute infiltrates, effusion or congestion.
         ** Electronically Signed by LEOPOLDO De La Cruz on 2019 at 1334 ** 
                    Reported and signed by: Eagle De La Cruz M.D.                  
         CC: Hema Florez DO                                                   
                                                                    Bonny
hnologist: LYNN SINHA)                            Trnscrd Date/T
darling/By: 2019 (1234) : By: [email or web address]           Orig Print D/T: S: 
9 (2097)                         PAGE  1                       Signed Report    
                          MRI SPINE LUMBAR WO    [place] [address]      Patient 
Name: SOLO SANCHEZ   MR #: [id number]    : 1940 Age/Sex: 77/F  Acct #: 
[id number] Req #: [id number]  Adm Physician:     Ordered by: PANDA ONEILL DO
 Report #: [id number]   Location: MRI  Room/Bed:     
____________________________________________________________________________
_______________________    Procedure: [id number] MRI/MRI SPINE LUMBAR WO  Exam Da
te: 17                            Exam Time: 1455       REPORT STATUS: Sig
tanvi    Examination: MRI SPINE LUMBAR WITHOUT CONTRAST      History: 77-year-old 
female with chronic back pain radiating down the right   lower extremity.   Comp
arison studies: X-rays of lumbar spine performed 2017 and   mbe
2010.      Technique:    Sagittal, coronal  and axial T2 , sagittal T1 and
STIR; axial  spin density   oblique.      Findings:      Number of lumbar verte
bral bodies: Five.      Alignment: Normal lordosis. Mild leftward curvature cent
ered at L3-L4.   Soft tissues: No T2 hyperintense inflammatory changes.   Poster
ior paraspinal soft tissues and muscles: No abnormality.    Lower thoracic cord:
Normal in signal and morphology. The tip of the conus is   at L1-L2.   Cauda eq
uina: No masses.  No arachnoiditis.      Vertebrae:    No fractures, infection o
r neoplasm.      Degenerative changes:      L1-L2:   Asymmetric to the right dis
c bulge results in mild canal stenosis and moderate   right and mild left neural
foraminal narrowing.      L2-L3:   Asymmetric to the right disc bulge results in
moderate right and mild left   neural foraminal narrowing and mild canal steno
sis.      L3-L4:   Asymmetric to the right disc bulge, mild bilateral facet arth
ropathy and   prominent posterior epidural fat result in moderate right neural f
oraminal   narrowing and moderate canal stenosis.    No left foraminal narrowing
.      L4-L5:   Diffuse disc bulge with central disc protrusion and mild bilater
al facet   arthropathy result in mild bilateral neural foraminal narrowing and s
evere   canal stenosis.      L5-S1:   Diffuse disc bulge and mild bilateral face
t arthropathy result in moderate left   neural foraminal narrowing.    No right 
foraminal or canal stenosis.      IMPRESSION:       1.  Degenerative changes at 
L2-L5 S1 with severe canal stenosis at L4-L5,   moderate canal stenosis at L3-L4
and mild canal stenosis at L1-L2 and L2-L3.   2.  Moderate right foraminal narr
owing from L1-L2 through L3-L4 and moderate   left foraminal narrowing at L5-S1.
  3.  Mild leftward curvature centered at L3-L4.      Signed by: Dr. Nimo bowman M.D. on 2017 3:59 PM        Dictated By: NIMO KUNZ MD  Electronically Signed By: NIMO KUNZ MD on 17 155  [place]
scribed By: SUSY on 17 155       COPY TO:   PANDA ONEILL DO        
CHEST 2 VIEWS    [place] [address]      Patient Name: SOLO SANCHEZ   MR #: 
[id number]    : 1940 Age/Sex: 77/F  Acct #: [id number] Req #: [id number]
[id number]  Adm Physician:     Ordered by: JO ANN DUARTE MD  Report #: [id number]
  Location: ER  Room/Bed:     
___________________________________________________________________________
________________________    Procedure: [id number] DX/CHEST 2 VIEWS  Exam Date:                             Exam Time: 1510       REPORT STATUS: Signed   
   EXAMINATION: PA and lateral views of the chest.      COMPARISON: None      C
LINICAL HISTORY:  Fall           DISCUSSION:      Lines/tubes:  None.      Lungs
:  The lungs are well inflated and clear. There is no evidence of   pneumonia or
pulmonary edema.      Pleura:  There is no pleural effusion or pneumothorax.    
 Heart and mediastinum:  The cardiomediastinal silhouette is normal.      Bones 
and soft tissues:  No acute bony abnormalities.        IMPRESSION:    No acute 
cardiopulmonary abnormalities.                  Signed by: Dr. [place] Palisch, M.D. on 2017 3:28 PM        Dictated By: ANDREW R PALISCH MD  Electronical
ly Signed By: ANDREW R PALISCH MD on 17 1528  Transcribed By: SUSY on 17 1528       COPY TO:   JO ANN DUARTE MD        SP LUMBAR AP   LATERAL 
2-3VWS    [facility]   [address] [place] [address]      Patient Name: SOLO SANCHEZ   MR #: 
[id number]    : 1940 Age/Sex: 77/F  Acct #: [id number] Req #: [id number]
[id number]  Adm Physician:     Ordered by: JO ANN DUARTE MD  Report #: [id number]
  Location: ER  Room/Bed:     
___________________________________________________________________________
________________________    Procedure: [id number] DX/SP LUMBAR AP   LATERAL 2-3VW
S  Exam Date: 17                            Exam Time: 1256       REPORT S
TATUS: Signed       Exam:  Lumbar spine 2 view      History:  Back pain      Com
parison: None.      Findings: No fracture. Leftward lumbar curvature. Multilevel
disc space   narrowing most advanced at L3 and L4 at the concave aspect on the 
right. Lower   lumbar facet arthropathy. No abnormal soft tissue calcification o
r soft tissue   defect.        Impression:      Advanced lumbar spondylosis with
severe degenerative disc disease L3-L4 and   lower lumbar facet arthropathy.    
    Signed by: Dr. Andrew Palisch, M.D. on 2017 2:05 PM        Dictated 
By: ANDREW R PALISCH MD  Electronically Signed By: ANDREW R PALISCH MD on 1405  Transcribed By: SUSY on 174       COPY TO:   JO ANN DUARTE MD

## 2020-11-15 NOTE — DIAGNOSTIC IMAGING REPORT
EXAMINATION:  CHEST SINGLE (PORTABLE)    



INDICATION: Weakness after recent fall.



COMPARISON:  Chest x-ray on 11/26/2017.

     

FINDINGS:    



TUBES and LINES:  None.



LUNGS:  Normal lung volumes. There is diffuse interstitial prominence

throughout both lungs. Bibasilar atelectasis. No consolidations.



PLEURA:  No pleural effusion or pneumothorax.



HEART AND MEDIASTINUM:  The cardiomediastinal silhouette is unremarkable.    



BONES AND SOFT TISSUES:  No acute osseous lesion. Multilobulated hyperdensity

projecting over the partially imaged left humeral head. Soft tissues are

unremarkable.



UPPER ABDOMEN: No free air under the diaphragm.    



IMPRESSION: 

1.  Diffuse interstitial prominence throughout both lungs which may be related

to reactive airway disease, bronchitis or pulmonary vascular congestion. 

2.  Multilobulated hyperdensity projecting over the partially imaged left

humeral head, not seen on prior examination. This may represent an intraosseous

lesion versus hydroxyapatite deposition within the glenohumeral joint space.

Recommend dedicated left shoulder x-rays.





Signed by: Alexandra Brown MD on 11/15/2020 5:53 PM

## 2020-11-15 NOTE — DIAGNOSTIC IMAGING REPORT
X-ray left shoulder 2 views 



HISTORY:  Pain.    

COMPARISON: None available.

     

FINDINGS:

Bones:

No acute displaced fracture.  

Osseous alignment is within normal limits.

.

Joints:

The joint spaces are well-maintained.

Degenerative changes in the shoulder.



Soft tissues:

Globular dense calcifications about the shoulder, which rotate on internal and

external rotation



IMPRESSION: 



No radiographic evidence of fracture.

Rotator cuff calcific tendinopathy.



Signed by: Leander Austin DO on 11/15/2020 8:04 PM

## 2020-11-15 NOTE — DIAGNOSTIC IMAGING REPORT
EXAM: CT Abdomen and Pelvis WITH contrast  

INDICATION:      

^ELEV LFT'S

^[id number]

^1856 

COMPARISON: None.

TECHNIQUE: Abdomen and pelvis were scanned utilizing a multidetector helical

scanner from the lung base to the pubic symphysis after administration of IV

contrast. Coronal and sagittal reformations were obtained. Routine protocol was

performed. Scan was performed when during portal venous phase.    

     IV CONTRAST: 100 mL of Isovue 370

     ORAL CONTRAST: None

            

COMPLICATIONS: None



RADIATION DOSE:

     Total DLP: 377 mGy*cm

     Estimated effective dose: (DLP x 0.015 x size factor) mSv

     CTDIvol has been reviewed. It is below the limits set by the Radiation

Protocol Committee (RPC).

     Dose modulation, iterative reconstruction, and/or weight based adjustment

of the mA/kV was utilized to reduce the radiation dose to as low as reasonably

achievable. 



FINDINGS:



LINES and TUBES: Urinary bladder Herbert catheter in place, tip and retention

balloon within the bladder lumen. None.



LOWER THORAX:      aortic valve calcifications.



HEPATOBILIARY: Dense calcified nodule along the superior aspect of the liver is

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  No focal hepatic

lesions. No intrahepatic biliary ductal dilation. Subtle distal common bile

duct dilation is expected status post cholecystectomy.



GALLBLADDER: There are cholecystectomy clips.     



SPLEEN: No splenomegaly. Multiple subtle focal hypodensities in the spleen,

likely perfusional.



PANCREAS: No focal masses or ductal dilatation.  



ADRENALS: No adrenal nodules    



KIDNEYS/URETERS: Kidneys enhance symmetrically.  No hydronephrosis. No cystic

or solid mass lesions.  No stones.



GI TRACT: Small sliding gastric hiatal hernia. No abnormal distention, wall

thickening, or evidence of bowel obstruction.  No appendicitis.   



PELVIC ORGANS/BLADDER: Hysterectomy. No neck masses. Urinary bladder under

distended with Herbert catheter in place..    



LYMPH NODES: No lymphadenopathy.



VESSELS:     Arterial calcifications.



PERITONEUM / RETROPERITONEUM: No free air or fluid.



BONES: Partially visualized right proximal femoral fixation hardware. Advanced

degenerative changes. Nonhealed chronic right pubic fracture deformities.



SOFT TISSUES: Benign calcified subcutaneous gluteal granulomata.             



IMPRESSION: 



No acute CT abnormality in the abdomen or pelvis. Chronic findings as above.



Signed by: Leander Austin DO on 11/15/2020 7:50 PM

## 2020-11-15 NOTE — DIAGNOSTIC IMAGING REPORT
EXAMINATION: CT of the cervical spine 



HISTORY: 80-year-old female status post fall, trauma, pain

COMPARISON: None available

TECHNIQUE: Multidetector helical axial images were obtained without contrast

from the foramen magnum to T1. Dose modulation, iterative reconstruction,

and/or weight based adjustment of the mA/kV was utilized to reduce the

radiation dose to as low as reasonably achievable.



FINDINGS:

  

       Alignment: There is facet of the cervical lordosis centered at C5-C6.

Grade 1 anterolisthesis of C2 on C3 and C3 on C4.

       Soft tissues: Normal

       Vertebrae: Normal height and density. No acute fracture, infection or

neoplasm



       Degenerative changes:

  C1-C2: Minimal degenerative changes with marginal osteophyte, articular space

narrowing. Thickening of the atlantodental interval ligaments and mild

narrowing of the current cervical junction.

  C2-C3: Prominent facet arthrosis. No stenoses.

  C3-C4: Fusion of the posterior elements. Small disc osteophyte complex

formation. No stenosis.

  C4-C5: Small disc osteophyte, uncovertebral and facet arthrosis. No

significant stenoses.

  C5-C6: Asymmetric right disc osteophyte, uncovertebral and facet arthrosis.

Moderate right and mild left foraminal stenoses.

  C6-C7: Disc osteophyte compresses formation, bilateral uncovertebral and

facet arthrosis. Moderate right and severe left foraminal stenosis.

  C7-T1: Bilateral facet arthrosis. Mild left foraminal narrowing.

  



IMPRESSION:



1.  No acute cervical spine postraumatic abnormalities. 

2.  Multilevel chronic degenerative changes as detailed above.

3.  Reversal of the cervical lordosis and spondylolisthesis at C3-C4 and C4-C5.



Note: Acute postraumatic spinal cord, vascular or ligamentous injuries cannot

adequately  be assessed by CT.





Signed by: Dr. Roberta Lees M.D. on 11/15/2020 5:52 PM

## 2020-11-15 NOTE — DIAGNOSTIC IMAGING REPORT
EXAMINATION: Head CT 



HISTORY: 80-year-old female status post fall, weakness, pain

COMPARISON: Head CT 12/31/2010

TECHNIQUE: Helical  axial images of the head were obtained. Reformatted coronal

and sagittal images from the axial data.  Dose modulation, iterative

reconstruction, and/or weight based adjustment of the mA/kV was utilized to

reduce the radiation dose to as low as reasonably achievable.



FINDINGS:



     Parenchyma: 

1.  Few scattered and mildly confluent periventricular white matter

hypodensities, most likely nonspecific chronic microvascular ischemic changes.

2.  No mass or hemorrhage. No CT evidence of acute territorial vascular insult.



    

     Extra-axial spaces:No abnormal density. No extra-axial fluid collections 

     Brain volume: Normal for age. 

     Ventricles: No hydrocephalus or displacement.  

     Arteries: No density suggestive of thrombus. 

     Dural sinuses: No abnormal density. 

     Foramen magnum: No mass, Chiari malformation, or basilar invagination. 

     Sella: No obvious mass.  

     Paranasal/mastoid sinuses: Imaged portions unremarkable. 

     Skull/Scalp: No lytic or blastic lesions.  No fractures. 



IMPRESSION:



1.  No acute posttraumatic intracranial abnormalities, particularly no

hemorrhagic.

2.  Mild chronic microvascular ischemic changes.



Signed by: Dr. Roberta Lees M.D. on 11/15/2020 5:34 PM

## 2023-05-24 ENCOUNTER — APPOINTMENT (RX ONLY)
Dept: URBAN - NONMETROPOLITAN AREA CLINIC 41 | Facility: CLINIC | Age: 83
Setting detail: DERMATOLOGY
End: 2023-05-24

## 2023-05-24 VITALS — HEIGHT: 65 IN | WEIGHT: 134 LBS

## 2023-05-24 DIAGNOSIS — L81.4 OTHER MELANIN HYPERPIGMENTATION: ICD-10-CM | Status: STABLE

## 2023-05-24 DIAGNOSIS — L82.0 INFLAMED SEBORRHEIC KERATOSIS: ICD-10-CM | Status: INADEQUATELY CONTROLLED

## 2023-05-24 DIAGNOSIS — D18.0 HEMANGIOMA: ICD-10-CM | Status: STABLE

## 2023-05-24 DIAGNOSIS — Z85.828 PERSONAL HISTORY OF OTHER MALIGNANT NEOPLASM OF SKIN: ICD-10-CM | Status: STABLE

## 2023-05-24 DIAGNOSIS — L60.8 OTHER NAIL DISORDERS: ICD-10-CM | Status: STABLE

## 2023-05-24 DIAGNOSIS — L82.1 OTHER SEBORRHEIC KERATOSIS: ICD-10-CM | Status: STABLE

## 2023-05-24 DIAGNOSIS — D22 MELANOCYTIC NEVI: ICD-10-CM | Status: STABLE

## 2023-05-24 PROBLEM — D22.5 MELANOCYTIC NEVI OF TRUNK: Status: ACTIVE | Noted: 2023-05-24

## 2023-05-24 PROBLEM — D18.01 HEMANGIOMA OF SKIN AND SUBCUTANEOUS TISSUE: Status: ACTIVE | Noted: 2023-05-24

## 2023-05-24 PROCEDURE — ? DIAGNOSIS COMMENT

## 2023-05-24 PROCEDURE — ? LIQUID NITROGEN

## 2023-05-24 PROCEDURE — ? COUNSELING

## 2023-05-24 PROCEDURE — 17110 DESTRUCTION B9 LES UP TO 14: CPT

## 2023-05-24 PROCEDURE — 99203 OFFICE O/P NEW LOW 30 MIN: CPT | Mod: 25

## 2023-05-24 ASSESSMENT — LOCATION SIMPLE DESCRIPTION DERM
LOCATION SIMPLE: LEFT FOREARM
LOCATION SIMPLE: RIGHT UPPER BACK
LOCATION SIMPLE: CHEST
LOCATION SIMPLE: ABDOMEN
LOCATION SIMPLE: LEFT THIGH
LOCATION SIMPLE: LEFT PRETIBIAL REGION
LOCATION SIMPLE: RIGHT PRETIBIAL REGION
LOCATION SIMPLE: LEFT POPLITEAL SKIN
LOCATION SIMPLE: RIGHT CALF
LOCATION SIMPLE: LEFT CALF
LOCATION SIMPLE: RIGHT 2ND TOE
LOCATION SIMPLE: RIGHT THIGH
LOCATION SIMPLE: RIGHT FOREARM
LOCATION SIMPLE: RIGHT GREAT TOE
LOCATION SIMPLE: LEFT CHEEK
LOCATION SIMPLE: UPPER BACK
LOCATION SIMPLE: NECK
LOCATION SIMPLE: RIGHT CHEEK
LOCATION SIMPLE: LEFT GREAT TOE
LOCATION SIMPLE: LEFT CLAVICULAR SKIN

## 2023-05-24 ASSESSMENT — LOCATION DETAILED DESCRIPTION DERM
LOCATION DETAILED: RIGHT PROXIMAL DORSAL FOREARM
LOCATION DETAILED: RIGHT PROXIMAL CALF
LOCATION DETAILED: LEFT PROXIMAL CALF
LOCATION DETAILED: LEFT CLAVICULAR SKIN
LOCATION DETAILED: LEFT PROXIMAL PRETIBIAL REGION
LOCATION DETAILED: SUPERIOR THORACIC SPINE
LOCATION DETAILED: RIGHT DISTAL DORSAL FOREARM
LOCATION DETAILED: RIGHT LATERAL ABDOMEN
LOCATION DETAILED: RIGHT GREAT TOENAIL
LOCATION DETAILED: LEFT PROXIMAL DORSAL FOREARM
LOCATION DETAILED: UPPER STERNUM
LOCATION DETAILED: LEFT INFERIOR MEDIAL MALAR CHEEK
LOCATION DETAILED: RIGHT CENTRAL LATERAL NECK
LOCATION DETAILED: RIGHT PROXIMAL PRETIBIAL REGION
LOCATION DETAILED: RIGHT ANTERIOR DISTAL THIGH
LOCATION DETAILED: RIGHT 2ND TOENAIL
LOCATION DETAILED: LEFT GREAT TOENAIL
LOCATION DETAILED: LEFT ANTERIOR DISTAL THIGH
LOCATION DETAILED: RIGHT MEDIAL SUPERIOR CHEST
LOCATION DETAILED: LEFT POPLITEAL SKIN
LOCATION DETAILED: RIGHT SUPERIOR UPPER BACK
LOCATION DETAILED: RIGHT SUPERIOR LATERAL MALAR CHEEK

## 2023-05-24 ASSESSMENT — LOCATION ZONE DERM
LOCATION ZONE: NECK
LOCATION ZONE: LEG
LOCATION ZONE: TRUNK
LOCATION ZONE: ARM
LOCATION ZONE: TOENAIL
LOCATION ZONE: FACE

## 2023-05-24 NOTE — PROCEDURE: LIQUID NITROGEN
Render Post-Care Instructions In Note?: yes
Include Z78.9 (Other Specified Conditions Influencing Health Status) As An Associated Diagnosis?: No
Duration Of Freeze Thaw-Cycle (Seconds): 5-10
Post-Care Instructions: I reviewed with the patient in detail post-care instructions. Patient is to wear sunprotection, and avoid picking at any of the treated lesions. Pt may apply Vaseline to crusted or scabbing areas.
Consent: The patient's consent was obtained including but not limited to risks of crusting, scabbing, blistering, scarring, darker or lighter pigmentary change, recurrence, incomplete removal and infection.
Detail Level: Detailed
Medical Necessity Information: It is in your best interest to select a reason for this procedure from the list below. All of these items fulfill various CMS LCD requirements except the new and changing color options.
Medical Necessity Clause: This procedure was medically necessary because the lesions that were treated were:
Spray Paint Text: The liquid nitrogen was applied to the skin utilizing a spray paint frosting technique.
Number Of Freeze-Thaw Cycles: 1 freeze-thaw cycle

## 2023-05-24 NOTE — PROCEDURE: DIAGNOSIS COMMENT
Comment: Discussed ddx including onychomycosis.  Nail clipping in 2018 unremarkable.  Consider nail clipping in the future but she refuses at this time.  She will continue an OTC topical antifungal at this time after discussion of potential treatment options.
Render Risk Assessment In Note?: no
Detail Level: Simple

## 2024-05-06 ENCOUNTER — APPOINTMENT (RX ONLY)
Dept: URBAN - NONMETROPOLITAN AREA CLINIC 42 | Facility: CLINIC | Age: 84
Setting detail: DERMATOLOGY
End: 2024-05-06

## 2024-05-06 DIAGNOSIS — D22 MELANOCYTIC NEVI: ICD-10-CM

## 2024-05-06 DIAGNOSIS — L57.8 OTHER SKIN CHANGES DUE TO CHRONIC EXPOSURE TO NONIONIZING RADIATION: ICD-10-CM

## 2024-05-06 DIAGNOSIS — L82.1 OTHER SEBORRHEIC KERATOSIS: ICD-10-CM

## 2024-05-06 DIAGNOSIS — L57.0 ACTINIC KERATOSIS: ICD-10-CM

## 2024-05-06 PROBLEM — D22.62 MELANOCYTIC NEVI OF LEFT UPPER LIMB, INCLUDING SHOULDER: Status: ACTIVE | Noted: 2024-05-06

## 2024-05-06 PROBLEM — D23.4 OTHER BENIGN NEOPLASM OF SKIN OF SCALP AND NECK: Status: ACTIVE | Noted: 2024-05-06

## 2024-05-06 PROCEDURE — ? BIOPSY BY SHAVE METHOD

## 2024-05-06 PROCEDURE — ? LIQUID NITROGEN

## 2024-05-06 PROCEDURE — 11102 TANGNTL BX SKIN SINGLE LES: CPT

## 2024-05-06 PROCEDURE — 99213 OFFICE O/P EST LOW 20 MIN: CPT | Mod: 25

## 2024-05-06 PROCEDURE — 17000 DESTRUCT PREMALG LESION: CPT | Mod: 59

## 2024-05-06 PROCEDURE — ? COUNSELING

## 2024-05-06 PROCEDURE — 17003 DESTRUCT PREMALG LES 2-14: CPT

## 2024-05-06 ASSESSMENT — LOCATION SIMPLE DESCRIPTION DERM
LOCATION SIMPLE: RIGHT CHEEK
LOCATION SIMPLE: LEFT FOREARM
LOCATION SIMPLE: RIGHT LOWER BACK
LOCATION SIMPLE: LEFT SHOULDER
LOCATION SIMPLE: LEFT CALF
LOCATION SIMPLE: RIGHT PRETIBIAL REGION
LOCATION SIMPLE: LEFT UPPER BACK
LOCATION SIMPLE: LEFT CHEEK
LOCATION SIMPLE: LEFT POPLITEAL SKIN
LOCATION SIMPLE: LEFT PRETIBIAL REGION
LOCATION SIMPLE: RIGHT FOREARM

## 2024-05-06 ASSESSMENT — LOCATION DETAILED DESCRIPTION DERM
LOCATION DETAILED: LEFT MID-UPPER BACK
LOCATION DETAILED: LEFT SUPERIOR CENTRAL BUCCAL CHEEK
LOCATION DETAILED: LEFT DISTAL CALF
LOCATION DETAILED: RIGHT PROXIMAL DORSAL FOREARM
LOCATION DETAILED: LEFT DISTAL PRETIBIAL REGION
LOCATION DETAILED: RIGHT INFERIOR LATERAL MIDBACK
LOCATION DETAILED: LEFT DISTAL DORSAL FOREARM
LOCATION DETAILED: LEFT PROXIMAL DORSAL FOREARM
LOCATION DETAILED: LEFT POPLITEAL SKIN
LOCATION DETAILED: RIGHT CENTRAL BUCCAL CHEEK
LOCATION DETAILED: RIGHT LATERAL PROXIMAL PRETIBIAL REGION
LOCATION DETAILED: LEFT ANTERIOR SHOULDER
LOCATION DETAILED: RIGHT PROXIMAL PRETIBIAL REGION

## 2024-05-06 ASSESSMENT — LOCATION ZONE DERM
LOCATION ZONE: TRUNK
LOCATION ZONE: FACE
LOCATION ZONE: LEG
LOCATION ZONE: ARM

## 2024-05-06 NOTE — PROCEDURE: BIOPSY BY SHAVE METHOD

## 2024-08-06 ENCOUNTER — APPOINTMENT (RX ONLY)
Dept: URBAN - METROPOLITAN AREA CLINIC 193 | Facility: CLINIC | Age: 84
Setting detail: DERMATOLOGY
End: 2024-08-06

## 2024-08-06 DIAGNOSIS — L82.1 OTHER SEBORRHEIC KERATOSIS: ICD-10-CM | Status: INADEQUATELY CONTROLLED

## 2024-08-06 DIAGNOSIS — Z85.828 PERSONAL HISTORY OF OTHER MALIGNANT NEOPLASM OF SKIN: ICD-10-CM | Status: RESOLVED

## 2024-08-06 DIAGNOSIS — L82.0 INFLAMED SEBORRHEIC KERATOSIS: ICD-10-CM

## 2024-08-06 PROBLEM — D48.5 NEOPLASM OF UNCERTAIN BEHAVIOR OF SKIN: Status: ACTIVE | Noted: 2024-08-06

## 2024-08-06 PROBLEM — C43.9 MALIGNANT MELANOMA OF SKIN, UNSPECIFIED: Status: ACTIVE | Noted: 2024-08-06

## 2024-08-06 PROCEDURE — ? COUNSELING

## 2024-08-06 PROCEDURE — 11102 TANGNTL BX SKIN SINGLE LES: CPT

## 2024-08-06 PROCEDURE — ? BIOPSY BY SHAVE METHOD

## 2024-08-06 PROCEDURE — 99213 OFFICE O/P EST LOW 20 MIN: CPT | Mod: 25

## 2024-08-06 ASSESSMENT — LOCATION DETAILED DESCRIPTION DERM
LOCATION DETAILED: MID-FRONTAL SCALP
LOCATION DETAILED: RIGHT SUPERIOR POSTERIOR NECK
LOCATION DETAILED: LEFT VENTRAL PROXIMAL FOREARM
LOCATION DETAILED: RIGHT SUPERIOR FOREHEAD
LOCATION DETAILED: LEFT DISTAL DORSAL FOREARM
LOCATION DETAILED: LEFT PROXIMAL DORSAL FOREARM
LOCATION DETAILED: LEFT ANTECUBITAL SKIN

## 2024-08-06 ASSESSMENT — LOCATION ZONE DERM
LOCATION ZONE: ARM
LOCATION ZONE: SCALP
LOCATION ZONE: NECK
LOCATION ZONE: FACE

## 2024-08-06 ASSESSMENT — LOCATION SIMPLE DESCRIPTION DERM
LOCATION SIMPLE: POSTERIOR NECK
LOCATION SIMPLE: LEFT FOREARM
LOCATION SIMPLE: RIGHT FOREHEAD
LOCATION SIMPLE: LEFT UPPER ARM
LOCATION SIMPLE: ANTERIOR SCALP

## 2024-08-06 NOTE — HPI: SKIN LESION
Ry
How Severe Is Your Skin Lesion?: mild
Has Your Skin Lesion Been Treated?: not been treated
Is This A New Presentation, Or A Follow-Up?: Skin Lesions

## 2024-08-06 NOTE — PROCEDURE: BIOPSY BY SHAVE METHOD
Detail Level: Detailed
Depth Of Biopsy: dermis
Was A Bandage Applied: Yes
Size Of Lesion In Cm: 0.6
X Size Of Lesion In Cm: 0
Biopsy Type: H and E
Biopsy Method: Personna blade
Anesthesia Type: 1% lidocaine with epinephrine
Anesthesia Volume In Cc: 0.5
Hemostasis: Drysol
Wound Care: Petrolatum
Dressing: bandage
Destruction After The Procedure: No
Type Of Destruction Used: Curettage
Curettage Text: The wound bed was treated with curettage after the biopsy was performed.
Cryotherapy Text: The wound bed was treated with cryotherapy after the biopsy was performed.
Electrodesiccation Text: The wound bed was treated with electrodesiccation after the biopsy was performed.
Electrodesiccation And Curettage Text: The wound bed was treated with electrodesiccation and curettage after the biopsy was performed.
Silver Nitrate Text: The wound bed was treated with silver nitrate after the biopsy was performed.
Lab: [id number]
Lab Facility: [id number]
Consent was obtained and risks were reviewed including but not limited to scarring, infection, bleeding, scabbing, incomplete removal, nerve damage and allergy to anesthesia.
Post-Care Instructions: I reviewed with the patient in detail post-care instructions. Patient is to keep the biopsy site dry overnight, and then apply vasoline twice daily until healed. Patient may apply hydrogen peroxide soaks to remove any crusting.
Notification Instructions: Patient will be notified of biopsy results. However, patient instructed to call the office if not contacted within 2 weeks.
Billing Type: Third-Party Bill
Information: Selecting Yes will display possible errors in your note based on the variables you have selected. This validation is only offered as a suggestion for you. PLEASE NOTE THAT THE VALIDATION TEXT WILL BE REMOVED WHEN YOU FINALIZE YOUR NOTE. IF YOU WANT TO FAX A PRELIMINARY NOTE YOU WILL NEED TO TOGGLE THIS TO 'NO' IF YOU DO NOT WANT IT IN YOUR FAXED NOTE.

## 2024-10-11 ENCOUNTER — APPOINTMENT (RX ONLY)
Dept: URBAN - METROPOLITAN AREA CLINIC 182 | Facility: CLINIC | Age: 84
Setting detail: DERMATOLOGY
End: 2024-10-11

## 2024-10-11 DIAGNOSIS — Z71.89 OTHER SPECIFIED COUNSELING: ICD-10-CM

## 2024-10-11 DIAGNOSIS — Z85.828 PERSONAL HISTORY OF OTHER MALIGNANT NEOPLASM OF SKIN: ICD-10-CM

## 2024-10-11 DIAGNOSIS — L57.8 OTHER SKIN CHANGES DUE TO CHRONIC EXPOSURE TO NONIONIZING RADIATION: ICD-10-CM

## 2024-10-11 PROBLEM — D48.5 NEOPLASM OF UNCERTAIN BEHAVIOR OF SKIN: Status: ACTIVE | Noted: 2024-10-11

## 2024-10-11 PROCEDURE — ? PHOTO-DOCUMENTATION

## 2024-10-11 PROCEDURE — 11102 TANGNTL BX SKIN SINGLE LES: CPT

## 2024-10-11 PROCEDURE — 99213 OFFICE O/P EST LOW 20 MIN: CPT | Mod: 25

## 2024-10-11 PROCEDURE — ? COUNSELING

## 2024-10-11 PROCEDURE — ? BIOPSY BY SHAVE METHOD

## 2024-10-11 ASSESSMENT — LOCATION ZONE DERM
LOCATION ZONE: FACE
LOCATION ZONE: ARM

## 2024-10-11 ASSESSMENT — LOCATION DETAILED DESCRIPTION DERM
LOCATION DETAILED: RIGHT PROXIMAL DORSAL FOREARM
LOCATION DETAILED: RIGHT SUPERIOR FOREHEAD

## 2024-10-11 ASSESSMENT — LOCATION SIMPLE DESCRIPTION DERM
LOCATION SIMPLE: RIGHT FOREHEAD
LOCATION SIMPLE: RIGHT FOREARM

## 2024-10-11 NOTE — PROCEDURE: BIOPSY BY SHAVE METHOD
Detail Level: Detailed
Depth Of Biopsy: dermis
Was A Bandage Applied: Yes
Size Of Lesion In Cm: 0.5
X Size Of Lesion In Cm: 0
Biopsy Type: H and E
Biopsy Method: Personna blade
Anesthesia Type: 1% lidocaine with epinephrine
Hemostasis: Drysol
Wound Care: Petrolatum
Dressing: bandage
Destruction After The Procedure: No
Type Of Destruction Used: Curettage
Curettage Text: The wound bed was treated with curettage after the biopsy was performed.
Cryotherapy Text: The wound bed was treated with cryotherapy after the biopsy was performed.
Electrodesiccation Text: The wound bed was treated with electrodesiccation after the biopsy was performed.
Electrodesiccation And Curettage Text: The wound bed was treated with electrodesiccation and curettage after the biopsy was performed.
Silver Nitrate Text: The wound bed was treated with silver nitrate after the biopsy was performed.
Lab: [id number]
Lab Facility: [id number]
Consent was obtained and risks were reviewed including but not limited to scarring, infection, bleeding, scabbing, incomplete removal, nerve damage and allergy to anesthesia.
Post-Care Instructions: I reviewed with the patient in detail post-care instructions. Patient is to keep the biopsy site dry overnight, and then apply vasoline twice daily until healed. Patient may apply hydrogen peroxide soaks to remove any crusting.
Notification Instructions: Patient will be notified of biopsy results. However, patient instructed to call the office if not contacted within 2 weeks.
Billing Type: Third-Party Bill
Information: Selecting Yes will display possible errors in your note based on the variables you have selected. This validation is only offered as a suggestion for you. PLEASE NOTE THAT THE VALIDATION TEXT WILL BE REMOVED WHEN YOU FINALIZE YOUR NOTE. IF YOU WANT TO FAX A PRELIMINARY NOTE YOU WILL NEED TO TOGGLE THIS TO 'NO' IF YOU DO NOT WANT IT IN YOUR FAXED NOTE.

## 2024-10-25 ENCOUNTER — APPOINTMENT (RX ONLY)
Dept: URBAN - METROPOLITAN AREA CLINIC 182 | Facility: CLINIC | Age: 84
Setting detail: DERMATOLOGY
End: 2024-10-25

## 2024-10-25 PROBLEM — C44.622 SQUAMOUS CELL CARCINOMA OF SKIN OF RIGHT UPPER LIMB, INCLUDING SHOULDER: Status: ACTIVE | Noted: 2024-10-25

## 2024-10-25 PROCEDURE — 12032 INTMD RPR S/A/T/EXT 2.6-7.5: CPT

## 2024-10-25 PROCEDURE — ? COUNSELING

## 2024-10-25 PROCEDURE — 11602 EXC TR-EXT MAL+MARG 1.1-2 CM: CPT

## 2024-10-25 PROCEDURE — ? EXCISION

## 2024-10-25 NOTE — PROCEDURE: EXCISION
Surgeon (Optional): Adriano Solis PA-C
Biopsy Photograph Reviewed: Yes
Size Of Lesion In Cm: 0.5
X Size Of Lesion In Cm (Optional): 0
Anesthesia Volume In Cc: 0.3
Was An Eye Clamp Used?: No
Eye Clamp Note Details: An eye clamp was used during the procedure.
Excision Method: Elliptical
Saucerization Depth: dermis and superficial adipose tissue
Repair Type: Intermediate
Intermediate / Complex Repair - Final Wound Length In Cm: 5
Suturegard Retention Suture: 2-0 Nylon
Retention Suture Bite Size: 3 mm
Length To Time In Minutes Device Was In Place: 10
Number Of Hemigard Strips Per Side: 1
Undermining Type: Entire Wound
Debridement Text: The wound edges were debrided prior to proceeding with the closure to facilitate wound healing.
Helical Rim Text: The closure involved the helical rim.
Vermilion Border Text: The closure involved the vermilion border.
Nostril Rim Text: The closure involved the nostril rim.
Retention Suture Text: Retention sutures were placed to support the closure and prevent dehiscence.
Suture Removal: 14 days
Lab: [id number]
Lab Facility: [id number]
Graft Donor Site Bandage (Optional-Leave Blank If You Don't Want In Note): Steri-strips and a pressure bandage were applied to the donor site.
Epidermal Closure Graft Donor Site (Optional): simple interrupted
Billing Type: Third-Party Bill
Excision Depth: adipose tissue
Scalpel Size: 15 blade
Anesthesia Type: 1% lidocaine with epinephrine
Hemostasis: Electrocautery
Estimated Blood Loss (Cc): minimal
Detail Level: Detailed
Repair Depth: use same depth as excision depth
Anesthesia Volume In Cc: 3
Deep Sutures: 4-0 Vicryl
Epidermal Sutures: 4-0 Prolene
Wound Care: Petrolatum
Dressing: dry sterile dressing
Suturegard Intro: Intraoperative tissue expansion was performed, utilizing the SUTUREGARD device, in order to reduce wound tension.
Suturegard Body: The suture ends were repeatedly re-tightened and re-clamped to achieve the desired tissue expansion.
Hemigard Intro: Due to skin fragility and wound tension, it was decided to use HEMIGARD adhesive retention suture devices to permit a linear closure. The skin was cleaned and dried for a 6cm distance away from the wound. Excessive hair, if present, was removed to allow for adhesion.
Hemigard Postcare Instructions: The HEMIGARD strips are to remain completely dry for at least 5-7 days.
Positioning (Leave Blank If You Do Not Want): The patient was placed in a comfortable position exposing the surgical site.
Pre-Excision Curettage Text (Leave Blank If You Do Not Want): Prior to drawing the surgical margin the visible lesion was removed with curettage to clearly define the lesion size.
Complex Repair Preamble Text (Leave Blank If You Do Not Want): Extensive wide undermining was performed.
Intermediate Repair Preamble Text (Leave Blank If You Do Not Want): Undermining was performed with blunt dissection.
Curvilinear Excision Additional Text (Leave Blank If You Do Not Want): The margin was drawn around the clinically apparent lesion.  A curvilinear shape was then drawn on the skin incorporating the lesion and margins.  Incisions were then made along these lines to the appropriate tissue plane and the lesion was extirpated.
Fusiform Excision Additional Text (Leave Blank If You Do Not Want): The margin was drawn around the clinically apparent lesion.  A fusiform shape was then drawn on the skin incorporating the lesion and margins.  Incisions were then made along these lines to the appropriate tissue plane and the lesion was extirpated.
Elliptical Excision Additional Text (Leave Blank If You Do Not Want): The margin was drawn around the clinically apparent lesion.  An elliptical shape was then drawn on the skin incorporating the lesion and margins.  Incisions were then made along these lines to the appropriate tissue plane and the lesion was extirpated.
Saucerization Excision Additional Text (Leave Blank If You Do Not Want): The margin was drawn around the clinically apparent lesion.  Incisions were then made along these lines, in a tangential fashion, to the appropriate tissue plane and the lesion was extirpated.
Slit Excision Additional Text (Leave Blank If You Do Not Want): A linear line was drawn on the skin overlying the lesion. An incision was made slowly until the lesion was visualized.  Once visualized, the lesion was removed with blunt dissection.
Excisional Biopsy Additional Text (Leave Blank If You Do Not Want): The margin was drawn around the clinically apparent lesion. An elliptical shape was then drawn on the skin incorporating the lesion and margins.  Incisions were then made along these lines to the appropriate tissue plane and the lesion was extirpated.
Perilesional Excision Additional Text (Leave Blank If You Do Not Want): The margin was drawn around the clinically apparent lesion. Incisions were then made along these lines to the appropriate tissue plane and the lesion was extirpated.
Repair Performed By Another Provider Text (Leave Blank If You Do Not Want): After the tissue was excised the defect was repaired by another provider.
No Repair - Repaired With Adjacent Surgical Defect Text (Leave Blank If You Do Not Want): After the excision the defect was repaired concurrently with another surgical defect which was in close approximation.
Adjacent Tissue Transfer Text: The defect edges were debeveled with a #15 scalpel blade. Given the location of the defect and the proximity to free margins an adjacent tissue transfer was deemed most appropriate. Using a sterile surgical marker, an appropriate flap was drawn incorporating the defect and placing the expected incisions within the relaxed skin tension lines where possible. The area thus outlined was incised deep to adipose tissue with a #15 scalpel blade. The skin margins were undermined to an appropriate distance in all directions utilizing iris scissors and carried over to close the primary defect.
Advancement Flap (Single) Text: The defect edges were debeveled with a #15 scalpel blade. Given the location of the defect and the proximity to free margins a single advancement flap was deemed most appropriate. Using a sterile surgical marker, an appropriate advancement flap was drawn incorporating the defect and placing the expected incisions within the relaxed skin tension lines where possible. The area thus outlined was incised deep to adipose tissue with a #15 scalpel blade. The skin margins were undermined to an appropriate distance in all directions utilizing iris scissors. Following this, the designed flap was advanced and carried over into the primary defect and sutured into place.
Advancement Flap (Double) Text: The defect edges were debeveled with a #15 scalpel blade. Given the location of the defect and the proximity to free margins a double advancement flap was deemed most appropriate. Using a sterile surgical marker, the appropriate advancement flaps were drawn incorporating the defect and placing the expected incisions within the relaxed skin tension lines where possible. The area thus outlined was incised deep to adipose tissue with a #15 scalpel blade. The skin margins were undermined to an appropriate distance in all directions utilizing iris scissors. Following this, the designed flaps were advanced and carried over into the primary defect and sutured into place.
Burow's Advancement Flap Text: The defect edges were debeveled with a #15 scalpel blade. Given the location of the defect and the proximity to free margins a Burow's advancement flap was deemed most appropriate. Using a sterile surgical marker, the appropriate advancement flap was drawn incorporating the defect and placing the expected incisions within the relaxed skin tension lines where possible. The area thus outlined was incised deep to adipose tissue with a #15 scalpel blade. The skin margins were undermined to an appropriate distance in all directions utilizing iris scissors. Following this, the designed flap was advanced and carried over into the primary defect and sutured into place.
Chonodrocutaneous Helical Advancement Flap Text: The defect edges were debeveled with a #15 scalpel blade. Given the location of the defect and the proximity to free margins a chondrocutaneous helical advancement flap was deemed most appropriate. Using a sterile surgical marker, the appropriate advancement flap was drawn incorporating the defect and placing the expected incisions within the relaxed skin tension lines where possible. The area thus outlined was incised deep to adipose tissue with a #15 scalpel blade. The skin margins were undermined to an appropriate distance in all directions utilizing iris scissors. Following this, the designed flap was advanced and carried over into the primary defect and sutured into place.
Crescentic Advancement Flap Text: The defect edges were debeveled with a #15 scalpel blade. Given the location of the defect and the proximity to free margins a crescentic advancement flap was deemed most appropriate. Using a sterile surgical marker, the appropriate advancement flap was drawn incorporating the defect and placing the expected incisions within the relaxed skin tension lines where possible. The area thus outlined was incised deep to adipose tissue with a #15 scalpel blade. The skin margins were undermined to an appropriate distance in all directions utilizing iris scissors. Following this, the designed flap was advanced and carried over into the primary defect and sutured into place.
A-T Advancement Flap Text: The defect edges were debeveled with a #15 scalpel blade. Given the location of the defect, shape of the defect and the proximity to free margins an A-T advancement flap was deemed most appropriate. Using a sterile surgical marker, an appropriate advancement flap was drawn incorporating the defect and placing the expected incisions within the relaxed skin tension lines where possible. The area thus outlined was incised deep to adipose tissue with a #15 scalpel blade. The skin margins were undermined to an appropriate distance in all directions utilizing iris scissors. Following this, the designed flap was advanced and carried over into the primary defect and sutured into place.
O-T Advancement Flap Text: The defect edges were debeveled with a #15 scalpel blade. Given the location of the defect, shape of the defect and the proximity to free margins an O-T advancement flap was deemed most appropriate. Using a sterile surgical marker, an appropriate advancement flap was drawn incorporating the defect and placing the expected incisions within the relaxed skin tension lines where possible. The area thus outlined was incised deep to adipose tissue with a #15 scalpel blade. The skin margins were undermined to an appropriate distance in all directions utilizing iris scissors. Following this, the designed flap was advanced and carried over into the primary defect and sutured into place.
O-L Flap Text: The defect edges were debeveled with a #15 scalpel blade. Given the location of the defect, shape of the defect and the proximity to free margins an O-L flap was deemed most appropriate. Using a sterile surgical marker, an appropriate advancement flap was drawn incorporating the defect and placing the expected incisions within the relaxed skin tension lines where possible. The area thus outlined was incised deep to adipose tissue with a #15 scalpel blade. The skin margins were undermined to an appropriate distance in all directions utilizing iris scissors. Following this, the designed flap was advanced and carried over into the primary defect and sutured into place.
O-Z Flap Text: The defect edges were debeveled with a #15 scalpel blade. Given the location of the defect, shape of the defect and the proximity to free margins an O-Z flap was deemed most appropriate. Using a sterile surgical marker, an appropriate transposition flap was drawn incorporating the defect and placing the expected incisions within the relaxed skin tension lines where possible. The area thus outlined was incised deep to adipose tissue with a #15 scalpel blade. The skin margins were undermined to an appropriate distance in all directions utilizing iris scissors. Following this, the designed flap was carried over into the primary defect and sutured into place.
Double O-Z Flap Text: The defect edges were debeveled with a #15 scalpel blade. Given the location of the defect, shape of the defect and the proximity to free margins a Double O-Z flap was deemed most appropriate. Using a sterile surgical marker, an appropriate transposition flap was drawn incorporating the defect and placing the expected incisions within the relaxed skin tension lines where possible. The area thus outlined was incised deep to adipose tissue with a #15 scalpel blade. The skin margins were undermined to an appropriate distance in all directions utilizing iris scissors. Following this, the designed flap was carried over into the primary defect and sutured into place.
V-Y Flap Text: The defect edges were debeveled with a #15 scalpel blade. Given the location of the defect, shape of the defect and the proximity to free margins a V-Y flap was deemed most appropriate. Using a sterile surgical marker, an appropriate advancement flap was drawn incorporating the defect and placing the expected incisions within the relaxed skin tension lines where possible. The area thus outlined was incised deep to adipose tissue with a #15 scalpel blade. The skin margins were undermined to an appropriate distance in all directions utilizing iris scissors. Following this, the designed flap was advanced and carried over into the primary defect and sutured into place.
Advancement-Rotation Flap Text: The defect edges were debeveled with a #15 scalpel blade. Given the location of the defect, shape of the defect and the proximity to free margins an advancement-rotation flap was deemed most appropriate. Using a sterile surgical marker, an appropriate flap was drawn incorporating the defect and placing the expected incisions within the relaxed skin tension lines where possible. The area thus outlined was incised deep to adipose tissue with a #15 scalpel blade. The skin margins were undermined to an appropriate distance in all directions utilizing iris scissors. Following this, the designed flap was carried over into the primary defect and sutured into place.
Mercedes Flap Text: The defect edges were debeveled with a #15 scalpel blade. Given the location of the defect, shape of the defect and the proximity to free margins a Mercedes flap was deemed most appropriate. Using a sterile surgical marker, an appropriate advancement flap was drawn incorporating the defect and placing the expected incisions within the relaxed skin tension lines where possible. The area thus outlined was incised deep to adipose tissue with a #15 scalpel blade. The skin margins were undermined to an appropriate distance in all directions utilizing iris scissors. Following this, the designed flap was advanced and carried over into the primary defect and sutured into place.
Modified Advancement Flap Text: The defect edges were debeveled with a #15 scalpel blade. Given the location of the defect, shape of the defect and the proximity to free margins a modified advancement flap was deemed most appropriate. Using a sterile surgical marker, an appropriate advancement flap was drawn incorporating the defect and placing the expected incisions within the relaxed skin tension lines where possible. The area thus outlined was incised deep to adipose tissue with a #15 scalpel blade. The skin margins were undermined to an appropriate distance in all directions utilizing iris scissors. Following this, the designed flap was advanced and carried over into the primary defect and sutured into place.
Mucosal Advancement Flap Text: Given the location of the defect, shape of the defect and the proximity to free margins a mucosal advancement flap was deemed most appropriate. Incisions were made with a 15 blade scalpel in the appropriate fashion along the cutaneous vermilion border and the mucosal lip. The remaining actinically damaged mucosal tissue was excised.  The mucosal advancement flap was then elevated to the gingival sulcus with care taken to preserve the neurovascular structures and advanced into the primary defect. Care was taken to ensure that precise realignment of the vermilion border was achieved.
Peng Advancement Flap Text: The defect edges were debeveled with a #15 scalpel blade. Given the location of the defect, shape of the defect and the proximity to free margins a Peng advancement flap was deemed most appropriate. Using a sterile surgical marker, an appropriate advancement flap was drawn incorporating the defect and placing the expected incisions within the relaxed skin tension lines where possible. The area thus outlined was incised deep to adipose tissue with a #15 scalpel blade. The skin margins were undermined to an appropriate distance in all directions utilizing iris scissors. Following this, the designed flap was advanced and carried over into the primary defect and sutured into place.
Hatchet Flap Text: The defect edges were debeveled with a #15 scalpel blade. Given the location of the defect, shape of the defect and the proximity to free margins a hatchet flap was deemed most appropriate. Using a sterile surgical marker, an appropriate hatchet flap was drawn incorporating the defect and placing the expected incisions within the relaxed skin tension lines where possible. The area thus outlined was incised deep to adipose tissue with a #15 scalpel blade. The skin margins were undermined to an appropriate distance in all directions utilizing iris scissors. Following this, the designed flap was carried over into the primary defect and sutured into place.
Rotation Flap Text: The defect edges were debeveled with a #15 scalpel blade. Given the location of the defect, shape of the defect and the proximity to free margins a rotation flap was deemed most appropriate. Using a sterile surgical marker, an appropriate rotation flap was drawn incorporating the defect and placing the expected incisions within the relaxed skin tension lines where possible. The area thus outlined was incised deep to adipose tissue with a #15 scalpel blade. The skin margins were undermined to an appropriate distance in all directions utilizing iris scissors. Following this, the designed flap was carried over into the primary defect and sutured into place.
Bilateral Rotation Flap Text: The defect edges were debeveled with a #15 scalpel blade. Given the location of the defect, shape of the defect and the proximity to free margins a bilateral rotation flap was deemed most appropriate. Using a sterile surgical marker, an appropriate rotation flap was drawn incorporating the defect and placing the expected incisions within the relaxed skin tension lines where possible. The area thus outlined was incised deep to adipose tissue with a #15 scalpel blade. The skin margins were undermined to an appropriate distance in all directions utilizing iris scissors. Following this, the designed flap was carried over into the primary defect and sutured into place.
Spiral Flap Text: The defect edges were debeveled with a #15 scalpel blade. Given the location of the defect, shape of the defect and the proximity to free margins a spiral flap was deemed most appropriate. Using a sterile surgical marker, an appropriate rotation flap was drawn incorporating the defect and placing the expected incisions within the relaxed skin tension lines where possible. The area thus outlined was incised deep to adipose tissue with a #15 scalpel blade. The skin margins were undermined to an appropriate distance in all directions utilizing iris scissors. Following this, the designed flap was carried over into the primary defect and sutured into place.
Staged Advancement Flap Text: The defect edges were debeveled with a #15 scalpel blade. Given the location of the defect, shape of the defect and the proximity to free margins a staged advancement flap was deemed most appropriate. Using a sterile surgical marker, an appropriate advancement flap was drawn incorporating the defect and placing the expected incisions within the relaxed skin tension lines where possible. The area thus outlined was incised deep to adipose tissue with a #15 scalpel blade. The skin margins were undermined to an appropriate distance in all directions utilizing iris scissors. Following this, the designed flap was carried over into the primary defect and sutured into place.
Star Wedge Flap Text: The defect edges were debeveled with a #15 scalpel blade. Given the location of the defect, shape of the defect and the proximity to free margins a star wedge flap was deemed most appropriate. Using a sterile surgical marker, an appropriate rotation flap was drawn incorporating the defect and placing the expected incisions within the relaxed skin tension lines where possible. The area thus outlined was incised deep to adipose tissue with a #15 scalpel blade. The skin margins were undermined to an appropriate distance in all directions utilizing iris scissors. Following this, the designed flap was carried over into the primary defect and sutured into place.
Transposition Flap Text: The defect edges were debeveled with a #15 scalpel blade. Given the location of the defect and the proximity to free margins a transposition flap was deemed most appropriate. Using a sterile surgical marker, an appropriate transposition flap was drawn incorporating the defect. The area thus outlined was incised deep to adipose tissue with a #15 scalpel blade. The skin margins were undermined to an appropriate distance in all directions utilizing iris scissors. Following this, the designed flap was carried over into the primary defect and sutured into place.
Muscle Hinge Flap Text: The defect edges were debeveled with a #15 scalpel blade.  Given the size, depth and location of the defect and the proximity to free margins a muscle hinge flap was deemed most appropriate. Using a sterile surgical marker, an appropriate hinge flap was drawn incorporating the defect. The area thus outlined was incised with a #15 scalpel blade. The skin margins were undermined to an appropriate distance in all directions utilizing iris scissors. Following this, the designed flap was carried into the primary defect and sutured into place.
Mustarde Flap Text: The defect edges were debeveled with a #15 scalpel blade.  Given the size, depth and location of the defect and the proximity to free margins a Mustarde flap was deemed most appropriate. Using a sterile surgical marker, an appropriate flap was drawn incorporating the defect. The area thus outlined was incised with a #15 scalpel blade. The skin margins were undermined to an appropriate distance in all directions utilizing iris scissors. Following this, the designed flap was carried into the primary defect and sutured into place.
Nasal Turnover Hinge Flap Text: The defect edges were debeveled with a #15 scalpel blade.  Given the size, depth, location of the defect and the defect being full thickness a nasal turnover hinge flap was deemed most appropriate. Using a sterile surgical marker, an appropriate hinge flap was drawn incorporating the defect. The area thus outlined was incised with a #15 scalpel blade. The flap was designed to recreate the nasal mucosal lining and the alar rim. The skin margins were undermined to an appropriate distance in all directions utilizing iris scissors. Following this, the designed flap was carried over into the primary defect and sutured into place
Nasalis-Muscle-Based Myocutaneous Island Pedicle Flap Text: Using a #15 blade, an incision was made around the donor flap to the level of the nasalis muscle. Wide lateral undermining was then performed in both the subcutaneous plane above the nasalis muscle, and in a submuscular plane just above periosteum. This allowed the formation of a free nasalis muscle axial pedicle (based on the angular artery) which was still attached to the actual cutaneous flap, increasing its mobility and vascular viability. Hemostasis was obtained with pinpoint electrocoagulation. The flap was mobilized into position and the pivotal anchor points positioned and stabilized with buried interrupted sutures. Subcutaneous and dermal tissues were closed in a multilayered fashion with sutures. Tissue redundancies were excised, and the epidermal edges were apposed without significant tension and sutured with sutures.
Nasalis Myocutaneous Flap Text: Using a #15 blade, an incision was made around the donor flap to the level of the nasalis muscle. Wide lateral undermining was then performed in both the subcutaneous plane above the nasalis muscle, and in a submuscular plane just above periosteum. This allowed the formation of a free nasalis muscle axial pedicle which was still attached to the actual cutaneous flap, increasing its mobility and vascular viability. Hemostasis was obtained with pinpoint electrocoagulation. The flap was mobilized into position and the pivotal anchor points positioned and stabilized with buried interrupted sutures. Subcutaneous and dermal tissues were closed in a multilayered fashion with sutures. Tissue redundancies were excised, and the epidermal edges were apposed without significant tension and sutured with sutures.
Nasolabial Transposition Flap Text: The defect edges were debeveled with a #15 scalpel blade.  Given the size, depth and location of the defect and the proximity to free margins a nasolabial transposition flap was deemed most appropriate. Using a sterile surgical marker, an appropriate flap was drawn incorporating the defect. The area thus outlined was incised with a #15 scalpel blade. The skin margins were undermined to an appropriate distance in all directions utilizing iris scissors. Following this, the designed flap was carried into the primary defect and sutured into place.
Orbicularis Oris Muscle Flap Text: The defect edges were debeveled with a #15 scalpel blade.  Given that the defect affected the competency of the oral sphincter an orbicularis oris muscle flap was deemed most appropriate to restore this competency and normal muscle function.  Using a sterile surgical marker, an appropriate flap was drawn incorporating the defect. The area thus outlined was incised with a #15 scalpel blade. Following this, the designed flap was carried over into the primary defect and sutured into place.
Melolabial Transposition Flap Text: The defect edges were debeveled with a #15 scalpel blade. Given the location of the defect and the proximity to free margins a melolabial flap was deemed most appropriate. Using a sterile surgical marker, an appropriate melolabial transposition flap was drawn incorporating the defect. The area thus outlined was incised deep to adipose tissue with a #15 scalpel blade. The skin margins were undermined to an appropriate distance in all directions utilizing iris scissors. Following this, the designed flap was carried over into the primary defect and sutured into place.
Rectangular Flap Text: The defect edges were debeveled with a #15 scalpel blade. Given the location of the defect and the proximity to free margins a rectangular flap was deemed most appropriate. Using a sterile surgical marker, an appropriate rectangular flap was drawn incorporating the defect. The area thus outlined was incised deep to adipose tissue with a #15 scalpel blade. The skin margins were undermined to an appropriate distance in all directions utilizing iris scissors. Following this, the designed flap was carried over into the primary defect and sutured into place.
Rhombic Flap Text: The defect edges were debeveled with a #15 scalpel blade. Given the location of the defect and the proximity to free margins a rhombic flap was deemed most appropriate. Using a sterile surgical marker, an appropriate rhombic flap was drawn incorporating the defect. The area thus outlined was incised deep to adipose tissue with a #15 scalpel blade. The skin margins were undermined to an appropriate distance in all directions utilizing iris scissors. Following this, the designed flap was carried over into the primary defect and sutured into place.
Rhomboid Transposition Flap Text: The defect edges were debeveled with a #15 scalpel blade. Given the location of the defect and the proximity to free margins a rhomboid transposition flap was deemed most appropriate. Using a sterile surgical marker, an appropriate rhomboid flap was drawn incorporating the defect. The area thus outlined was incised deep to adipose tissue with a #15 scalpel blade. The skin margins were undermined to an appropriate distance in all directions utilizing iris scissors. Following this, the designed flap was carried over into the primary defect and sutured into place.
Bi-Rhombic Flap Text: The defect edges were debeveled with a #15 scalpel blade. Given the location of the defect and the proximity to free margins a bi-rhombic flap was deemed most appropriate. Using a sterile surgical marker, an appropriate rhombic flap was drawn incorporating the defect. The area thus outlined was incised deep to adipose tissue with a #15 scalpel blade. The skin margins were undermined to an appropriate distance in all directions utilizing iris scissors. Following this, the designed flap was carried over into the primary defect and sutured into place.
Helical Rim Advancement Flap Text: The defect edges were debeveled with a #15 blade scalpel.  Given the location of the defect and the proximity to free margins (helical rim) a double helical rim advancement flap was deemed most appropriate. Using a sterile surgical marker, the appropriate advancement flaps were drawn incorporating the defect and placing the expected incisions between the helical rim and antihelix where possible.  The area thus outlined was incised through and through with a #15 scalpel blade.  With a skin hook and iris scissors, the flaps were gently and sharply undermined and freed up. Folllowing this, the designed flaps were carried over into the primary defect and sutured into place.
Bilateral Helical Rim Advancement Flap Text: The defect edges were debeveled with a #15 blade scalpel.  Given the location of the defect and the proximity to free margins (helical rim) a bilateral helical rim advancement flap was deemed most appropriate. Using a sterile surgical marker, the appropriate advancement flaps were drawn incorporating the defect and placing the expected incisions between the helical rim and antihelix where possible.  The area thus outlined was incised through and through with a #15 scalpel blade.  With a skin hook and iris scissors, the flaps were gently and sharply undermined and freed up. Following this, the designed flaps were placed into the primary defect and sutured into place.
Ear Star Wedge Flap Text: The defect edges were debeveled with a #15 blade scalpel.  Given the location of the defect and the proximity to free margins (helical rim) an ear star wedge flap was deemed most appropriate. Using a sterile surgical marker, the appropriate flap was drawn incorporating the defect and placing the expected incisions between the helical rim and antihelix where possible.  The area thus outlined was incised through and through with a #15 scalpel blade. Following this, the designed flap was carried over into the primary defect and sutured into place.
Flip-Flop Flap Text: The defect edges were debeveled with a #15 blade scalpel.  Given the location of the defect and the proximity to free margins a flip-flop flap was deemed most appropriate. Using a sterile surgical marker, the appropriate flap was drawn incorporating the defect and placing the expected incisions between the helical rim and antihelix where possible.  The area thus outlined was incised through and through with a #15 scalpel blade. Following this, the designed flap was carried over into the primary defect and sutured into place.
Banner Transposition Flap Text: The defect edges were debeveled with a #15 scalpel blade. Given the location of the defect and the proximity to free margins a Banner transposition flap was deemed most appropriate. Using a sterile surgical marker, an appropriate flap was drawn around the defect. The area thus outlined was incised deep to adipose tissue with a #15 scalpel blade. The skin margins were undermined to an appropriate distance in all directions utilizing iris scissors. Following this, the designed flap was carried into the primary defect and sutured into place.
Bilobed Flap Text: The defect edges were debeveled with a #15 scalpel blade. Given the location of the defect and the proximity to free margins a bilobe flap was deemed most appropriate. Using a sterile surgical marker, an appropriate bilobe flap drawn around the defect. The area thus outlined was incised deep to adipose tissue with a #15 scalpel blade. The skin margins were undermined to an appropriate distance in all directions utilizing iris scissors. Following this, the designed flap was carried over into the primary defect and sutured into place.
Bilobed Transposition Flap Text: The defect edges were debeveled with a #15 scalpel blade. Given the location of the defect and the proximity to free margins a bilobed transposition flap was deemed most appropriate. Using a sterile surgical marker, an appropriate bilobe flap drawn around the defect. The area thus outlined was incised deep to adipose tissue with a #15 scalpel blade. The skin margins were undermined to an appropriate distance in all directions utilizing iris scissors. Following this, the designed flap was carried over into the primary defect and sutured into place.
Trilobed Flap Text: The defect edges were debeveled with a #15 scalpel blade. Given the location of the defect and the proximity to free margins a trilobed flap was deemed most appropriate. Using a sterile surgical marker, an appropriate trilobed flap was drawn around the defect. The area thus outlined was incised deep to adipose tissue with a #15 scalpel blade. The skin margins were undermined to an appropriate distance in all directions utilizing iris scissors. Following this, the designed flap was carried into the primary defect and sutured into place.
Dorsal Nasal Flap Text: The defect edges were debeveled with a #15 scalpel blade. Given the location of the defect and the proximity to free margins a dorsal nasal flap was deemed most appropriate. Using a sterile surgical marker, an appropriate dorsal nasal flap was drawn around the defect. The area thus outlined was incised deep to adipose tissue with a #15 scalpel blade. The skin margins were undermined to an appropriate distance in all directions utilizing iris scissors. Following this, the designed flap was carried into the primary defect and sutured into place.
Island Pedicle Flap Text: The defect edges were debeveled with a #15 scalpel blade. Given the location of the defect, shape of the defect and the proximity to free margins an island pedicle advancement flap was deemed most appropriate. Using a sterile surgical marker, an appropriate advancement flap was drawn incorporating the defect, outlining the appropriate donor tissue and placing the expected incisions within the relaxed skin tension lines where possible. The area thus outlined was incised deep to adipose tissue with a #15 scalpel blade. The skin margins were undermined to an appropriate distance in all directions around the primary defect and laterally outward around the island pedicle utilizing iris scissors.  There was minimal undermining beneath the pedicle flap. Following this, the flap was carried over into the primary defect and sutured into place.
Island Pedicle Flap With Canthal Suspension Text: The defect edges were debeveled with a #15 scalpel blade. Given the location of the defect, shape of the defect and the proximity to free margins an island pedicle advancement flap was deemed most appropriate. Using a sterile surgical marker, an appropriate advancement flap was drawn incorporating the defect, outlining the appropriate donor tissue and placing the expected incisions within the relaxed skin tension lines where possible. The area thus outlined was incised deep to adipose tissue with a #15 scalpel blade. The skin margins were undermined to an appropriate distance in all directions around the primary defect and laterally outward around the island pedicle utilizing iris scissors.  There was minimal undermining beneath the pedicle flap. A suspension suture was placed in the canthal tendon to prevent tension and prevent ectropion. Following this, the designed flap was placed into the primary defect and sutured into place.
Alar Island Pedicle Flap Text: The defect edges were debeveled with a #15 scalpel blade. Given the location of the defect, shape of the defect and the proximity to the alar rim an island pedicle advancement flap was deemed most appropriate. Using a sterile surgical marker, an appropriate advancement flap was drawn incorporating the defect, outlining the appropriate donor tissue and placing the expected incisions within the nasal ala running parallel to the alar rim. The area thus outlined was incised with a #15 scalpel blade. The skin margins were undermined minimally to an appropriate distance in all directions around the primary defect and laterally outward around the island pedicle utilizing iris scissors.  There was minimal undermining beneath the pedicle flap. Following this, the designed flap was carried over into the primary defect and sutured into place.
Double Island Pedicle Flap Text: The defect edges were debeveled with a #15 scalpel blade. Given the location of the defect, shape of the defect and the proximity to free margins a double island pedicle advancement flap was deemed most appropriate. Using a sterile surgical marker, an appropriate advancement flap was drawn incorporating the defect, outlining the appropriate donor tissue and placing the expected incisions within the relaxed skin tension lines where possible. The area thus outlined was incised deep to adipose tissue with a #15 scalpel blade. The skin margins were undermined to an appropriate distance in all directions around the primary defect and laterally outward around the island pedicle utilizing iris scissors.  There was minimal undermining beneath the pedicle flap. Following this, the flap was carried over into the primary defect and sutured into place.
Island Pedicle Flap-Requiring Vessel Identification Text: The defect edges were debeveled with a #15 scalpel blade. Given the location of the defect, shape of the defect and the proximity to free margins an island pedicle advancement flap was deemed most appropriate. Using a sterile surgical marker, an appropriate advancement flap was drawn, based on the axial vessel mentioned above, incorporating the defect, outlining the appropriate donor tissue and placing the expected incisions within the relaxed skin tension lines where possible. The area thus outlined was incised deep to adipose tissue with a #15 scalpel blade. The skin margins were undermined to an appropriate distance in all directions around the primary defect and laterally outward around the island pedicle utilizing iris scissors.  There was minimal undermining beneath the pedicle flap. Following this, the designed flap was carried over into the primary defect and sutured into place.
Keystone Flap Text: The defect edges were debeveled with a #15 scalpel blade. Given the location of the defect, shape of the defect a keystone flap was deemed most appropriate. Using a sterile surgical marker, an appropriate keystone flap was drawn incorporating the defect, outlining the appropriate donor tissue and placing the expected incisions within the relaxed skin tension lines where possible. The area thus outlined was incised deep to adipose tissue with a #15 scalpel blade. The skin margins were undermined to an appropriate distance in all directions around the primary defect and laterally outward around the flap utilizing iris scissors. Following this, the designed flap was carried into the primary defect and sutured into place.
O-T Plasty Text: The defect edges were debeveled with a #15 scalpel blade. Given the location of the defect, shape of the defect and the proximity to free margins an O-T plasty was deemed most appropriate. Using a sterile surgical marker, an appropriate O-T plasty was drawn incorporating the defect and placing the expected incisions within the relaxed skin tension lines where possible. The area thus outlined was incised deep to adipose tissue with a #15 scalpel blade. The skin margins were undermined to an appropriate distance in all directions utilizing iris scissors. Following this, the designed flap was carried over into the primary defect and sutured into place.
O-Z Plasty Text: The defect edges were debeveled with a #15 scalpel blade. Given the location of the defect, shape of the defect and the proximity to free margins an O-Z plasty (double transposition flap) was deemed most appropriate. Using a sterile surgical marker, the appropriate transposition flaps were drawn incorporating the defect and placing the expected incisions within the relaxed skin tension lines where possible. The area thus outlined was incised deep to adipose tissue with a #15 scalpel blade. The skin margins were undermined to an appropriate distance in all directions utilizing iris scissors. Hemostasis was achieved with electrocautery. The flaps were then transposed and carried over into place, one clockwise and the other counterclockwise, and anchored with interrupted buried subcutaneous sutures.
Double O-Z Plasty Text: The defect edges were debeveled with a #15 scalpel blade. Given the location of the defect, shape of the defect and the proximity to free margins a Double O-Z plasty (double transposition flap) was deemed most appropriate. Using a sterile surgical marker, the appropriate transposition flaps were drawn incorporating the defect and placing the expected incisions within the relaxed skin tension lines where possible. The area thus outlined was incised deep to adipose tissue with a #15 scalpel blade. The skin margins were undermined to an appropriate distance in all directions utilizing iris scissors. Hemostasis was achieved with electrocautery. The flaps were then transposed and carried over into place, one clockwise and the other counterclockwise, and anchored with interrupted buried subcutaneous sutures.
V-Y Plasty Text: The defect edges were debeveled with a #15 scalpel blade. Given the location of the defect, shape of the defect and the proximity to free margins an V-Y advancement flap was deemed most appropriate. Using a sterile surgical marker, an appropriate advancement flap was drawn incorporating the defect and placing the expected incisions within the relaxed skin tension lines where possible. The area thus outlined was incised deep to adipose tissue with a #15 scalpel blade. The skin margins were undermined to an appropriate distance in all directions utilizing iris scissors. Following this, the designed flap was advanced and carried over into the primary defect and sutured into place.
H Plasty Text: Given the location of the defect, shape of the defect and the proximity to free margins a H-plasty was deemed most appropriate for repair. Using a sterile surgical marker, the appropriate advancement arms of the H-plasty were drawn incorporating the defect and placing the expected incisions within the relaxed skin tension lines where possible. The area thus outlined was incised deep to adipose tissue with a #15 scalpel blade. The skin margins were undermined to an appropriate distance in all directions utilizing iris scissors.  The opposing advancement arms were then advanced and carried over into place in opposite direction and anchored with interrupted buried subcutaneous sutures.
W Plasty Text: The lesion was extirpated to the level of the fat with a #15 scalpel blade. Given the location of the defect, shape of the defect and the proximity to free margins a W-plasty was deemed most appropriate for repair. Using a sterile surgical marker, the appropriate transposition arms of the W-plasty were drawn incorporating the defect and placing the expected incisions within the relaxed skin tension lines where possible. The area thus outlined was incised deep to adipose tissue with a #15 scalpel blade. The skin margins were undermined to an appropriate distance in all directions utilizing iris scissors. The opposing transposition arms were then transposed and carried over into place in opposite direction and anchored with interrupted buried subcutaneous sutures.
Z Plasty Text: The lesion was extirpated to the level of the fat with a #15 scalpel blade. Given the location of the defect, shape of the defect and the proximity to free margins a Z-plasty was deemed most appropriate for repair. Using a sterile surgical marker, the appropriate transposition arms of the Z-plasty were drawn incorporating the defect and placing the expected incisions within the relaxed skin tension lines where possible. The area thus outlined was incised deep to adipose tissue with a #15 scalpel blade. The skin margins were undermined to an appropriate distance in all directions utilizing iris scissors. The opposing transposition arms were then transposed and carried over into place in opposite direction and anchored with interrupted buried subcutaneous sutures.
Double Z Plasty Text: The lesion was extirpated to the level of the fat with a #15 scalpel blade. Given the location of the defect, shape of the defect and the proximity to free margins a double Z-plasty was deemed most appropriate for repair. Using a sterile surgical marker, the appropriate transposition arms of the double Z-plasty were drawn incorporating the defect and placing the expected incisions within the relaxed skin tension lines where possible. The area thus outlined was incised deep to adipose tissue with a #15 scalpel blade. The skin margins were undermined to an appropriate distance in all directions utilizing iris scissors. The opposing transposition arms were then transposed and carried over into place in opposite direction and anchored with interrupted buried subcutaneous sutures.
Zygomaticofacial Flap Text: Given the location of the defect, shape of the defect and the proximity to free margins a zygomaticofacial flap was deemed most appropriate for repair. Using a sterile surgical marker, the appropriate flap was drawn incorporating the defect and placing the expected incisions within the relaxed skin tension lines where possible. The area thus outlined was incised deep to adipose tissue with a #15 scalpel blade with preservation of a vascular pedicle.  The skin margins were undermined to an appropriate distance in all directions utilizing iris scissors. The flap was then carried over into the defect and anchored with interrupted buried subcutaneous sutures.
Cheek Interpolation Flap Text: A decision was made to reconstruct the defect utilizing an interpolation axial flap and a staged reconstruction.  A telfa template was made of the defect.  This telfa template was then used to outline the Cheek Interpolation flap.  The donor area for the pedicle flap was then injected with anesthesia.  The flap was excised through the skin and subcutaneous tissue down to the layer of the underlying musculature.  The interpolation flap was carefully excised within this deep plane to maintain its blood supply.  The edges of the donor site were undermined.   The donor site was closed in a primary fashion.  The pedicle was then rotated into position and sutured.  Once the tube was sutured into place, adequate blood supply was confirmed with blanching and refill.  The pedicle was then wrapped with xeroform gauze and dressed appropriately with a telfa and gauze bandage to ensure continued blood supply and protect the attached pedicle.
Cheek-To-Nose Interpolation Flap Text: A decision was made to reconstruct the defect utilizing an interpolation axial flap and a staged reconstruction.  A telfa template was made of the defect.  This telfa template was then used to outline the Cheek-To-Nose Interpolation flap.  The donor area for the pedicle flap was then injected with anesthesia.  The flap was excised through the skin and subcutaneous tissue down to the layer of the underlying musculature.  The interpolation flap was carefully excised within this deep plane to maintain its blood supply.  The edges of the donor site were undermined.   The donor site was closed in a primary fashion.  The pedicle was then rotated into position and sutured.  Once the tube was sutured into place, adequate blood supply was confirmed with blanching and refill.  The pedicle was then wrapped with xeroform gauze and dressed appropriately with a telfa and gauze bandage to ensure continued blood supply and protect the attached pedicle.
Interpolation Flap Text: A decision was made to reconstruct the defect utilizing an interpolation axial flap and a staged reconstruction.  A telfa template was made of the defect.  This telfa template was then used to outline the interpolation flap.  The donor area for the pedicle flap was then injected with anesthesia.  The flap was excised through the skin and subcutaneous tissue down to the layer of the underlying musculature.  The interpolation flap was carefully excised within this deep plane to maintain its blood supply.  The edges of the donor site were undermined.   The donor site was closed in a primary fashion.  The pedicle was then rotated into position and sutured.  Once the tube was sutured into place, adequate blood supply was confirmed with blanching and refill.  The pedicle was then wrapped with xeroform gauze and dressed appropriately with a telfa and gauze bandage to ensure continued blood supply and protect the attached pedicle.
Melolabial Interpolation Flap Text: A decision was made to reconstruct the defect utilizing an interpolation axial flap and a staged reconstruction.  A telfa template was made of the defect.  This telfa template was then used to outline the melolabial interpolation flap.  The donor area for the pedicle flap was then injected with anesthesia.  The flap was excised through the skin and subcutaneous tissue down to the layer of the underlying musculature.  The pedicle flap was carefully excised within this deep plane to maintain its blood supply.  The edges of the donor site were undermined.   The donor site was closed in a primary fashion.  The pedicle was then rotated into position and sutured.  Once the tube was sutured into place, adequate blood supply was confirmed with blanching and refill.  The pedicle was then wrapped with xeroform gauze and dressed appropriately with a telfa and gauze bandage to ensure continued blood supply and protect the attached pedicle.
Mastoid Interpolation Flap Text: A decision was made to reconstruct the defect utilizing an interpolation axial flap and a staged reconstruction.  A telfa template was made of the defect.  This telfa template was then used to outline the mastoid interpolation flap.  The donor area for the pedicle flap was then injected with anesthesia.  The flap was excised through the skin and subcutaneous tissue down to the layer of the underlying musculature.  The pedicle flap was carefully excised within this deep plane to maintain its blood supply.  The edges of the donor site were undermined.   The donor site was closed in a primary fashion.  The pedicle was then rotated into position and sutured.  Once the tube was sutured into place, adequate blood supply was confirmed with blanching and refill.  The pedicle was then wrapped with xeroform gauze and dressed appropriately with a telfa and gauze bandage to ensure continued blood supply and protect the attached pedicle.
Posterior Auricular Interpolation Flap Text: A decision was made to reconstruct the defect utilizing an interpolation axial flap and a staged reconstruction.  A telfa template was made of the defect.  This telfa template was then used to outline the posterior auricular interpolation flap.  The donor area for the pedicle flap was then injected with anesthesia.  The flap was excised through the skin and subcutaneous tissue down to the layer of the underlying musculature.  The pedicle flap was carefully excised within this deep plane to maintain its blood supply.  The edges of the donor site were undermined.   The donor site was closed in a primary fashion.  The pedicle was then rotated into position and sutured.  Once the tube was sutured into place, adequate blood supply was confirmed with blanching and refill.  The pedicle was then wrapped with xeroform gauze and dressed appropriately with a telfa and gauze bandage to ensure continued blood supply and protect the attached pedicle.
Paramedian Forehead Flap Text: A decision was made to reconstruct the defect utilizing an interpolation axial flap and a staged reconstruction.  A telfa template was made of the defect.  This telfa template was then used to outline the paramedian forehead pedicle flap.  The donor area for the pedicle flap was then injected with anesthesia.  The flap was excised through the skin and subcutaneous tissue down to the layer of the underlying musculature.  The pedicle flap was carefully excised within this deep plane to maintain its blood supply.  The edges of the donor site were undermined.   The donor site was closed in a primary fashion.  The pedicle was then rotated into position and sutured.  Once the tube was sutured into place, adequate blood supply was confirmed with blanching and refill.  The pedicle was then wrapped with xeroform gauze and dressed appropriately with a telfa and gauze bandage to ensure continued blood supply and protect the attached pedicle.
Abbe Flap (Upper To Lower Lip) Text: The defect of the lower lip was assessed and measured.  Given the location and size of the defect, an Abbe flap was deemed most appropriate. Using a sterile surgical marker, an appropriate Abbe flap was measured and drawn on the upper lip. Local anesthesia was then infiltrated.  A scalpel was then used to incise the upper lip through and through the skin, vermilion, muscle and mucosa, leaving the flap pedicled on the opposite side.  The flap was then rotated and transferred to the lower lip defect.  The flap was then sutured into place with a three layer technique, closing the orbicularis oris muscle layer with subcutaneous buried sutures, followed by a mucosal layer and an epidermal layer.
Abbe Flap (Lower To Upper Lip) Text: The defect of the upper lip was assessed and measured.  Given the location and size of the defect, an Abbe flap was deemed most appropriate. Using a sterile surgical marker, an appropriate Abbe flap was measured and drawn on the lower lip. Local anesthesia was then infiltrated. A scalpel was then used to incise the upper lip through and through the skin, vermilion, muscle and mucosa, leaving the flap pedicled on the opposite side.  The flap was then rotated and transferred to the lower lip defect.  The flap was then sutured into place with a three layer technique, closing the orbicularis oris muscle layer with subcutaneous buried sutures, followed by a mucosal layer and an epidermal layer.
Estlander Flap (Upper To Lower Lip) Text: The defect of the lower lip was assessed and measured.  Given the location and size of the defect, an Estlander flap was deemed most appropriate. Using a sterile surgical marker, an appropriate Estlander flap was measured and drawn on the upper lip. Local anesthesia was then infiltrated. A scalpel was then used to incise the lateral aspect of the flap, through skin, muscle and mucosa, leaving the flap pedicled medially.  The flap was then rotated and positioned to fill the lower lip defect.  The flap was then sutured into place with a three layer technique, closing the orbicularis oris muscle layer with subcutaneous buried sutures, followed by a mucosal layer and an epidermal layer.
Lip Wedge Excision Repair Text: Given the location of the defect and the proximity to free margins a full thickness wedge repair was deemed most appropriate. Using a sterile surgical marker, the appropriate repair was drawn incorporating the defect and placing the expected incisions perpendicular to the vermilion border.  The vermilion border was also meticulously outlined to ensure appropriate reapproximation during the repair.  The area thus outlined was incised through and through with a #15 scalpel blade.  The muscularis and dermis were reaproximated with deep sutures following hemostasis. Care was taken to realign the vermilion border before proceeding with the superficial closure.  Once the vermilion was realigned the superfical and mucosal closure was finished.
Ftsg Text: The defect edges were debeveled with a #15 scalpel blade. Given the location of the defect, shape of the defect and the proximity to free margins a full thickness skin graft was deemed most appropriate. Using a sterile surgical marker, the primary defect shape was transferred to the donor site. The area thus outlined was incised deep to adipose tissue with a #15 scalpel blade.  The harvested graft was then trimmed of adipose tissue until only dermis and epidermis was left.  The skin graft was then placed in the primary defect and oriented appropriately.
Split-Thickness Skin Graft Text: The defect edges were debeveled with a #15 scalpel blade. Given the location of the defect, shape of the defect and the proximity to free margins a split thickness skin graft was deemed most appropriate. Using a sterile surgical marker, the primary defect shape was transferred to the donor site. The split thickness graft was then harvested.  The skin graft was then placed in the primary defect and oriented appropriately.
Pinch Graft Text: The defect edges were debeveled with a #15 scalpel blade. Given the location of the defect, shape of the defect and the proximity to free margins a pinch graft was deemed most appropriate. Using a sterile surgical marker, the primary defect shape was transferred to the donor site. The area thus outlined was incised deep to adipose tissue with a #15 scalpel blade.  The harvested graft was then trimmed of adipose tissue until only dermis and epidermis was left. The skin graft was then placed in the primary defect and oriented appropriately.
Burow's Graft Text: The defect edges were debeveled with a #15 scalpel blade. Given the location of the defect, shape of the defect, the proximity to free margins and the presence of a standing cone deformity a Burow's skin graft was deemed most appropriate. The standing cone was removed and this tissue was then trimmed to the shape of the primary defect. The adipose tissue was also removed until only dermis and epidermis were left.  The skin graft was then placed in the primary defect and oriented appropriately.
Cartilage Graft Text: The defect edges were debeveled with a #15 scalpel blade. Given the location of the defect, shape of the defect, the fact the defect involved a full thickness cartilage defect a cartilage graft was deemed most appropriate.  An appropriate donor site was identified, cleansed, and anesthetized. The cartilage graft was then harvested and transferred to the recipient site, oriented appropriately and then sutured into place.  The secondary defect was then repaired using a primary closure.
Composite Graft Text: The defect edges were debeveled with a #15 scalpel blade. Given the location of the defect, shape of the defect, the proximity to free margins and the fact the defect was full thickness a composite graft was deemed most appropriate.  The defect was outline and then transferred to the donor site.  A full thickness graft was then excised from the donor site. The graft was then placed in the primary defect, oriented appropriately and then sutured into place.  The secondary defect was then repaired using a primary closure.
Epidermal Autograft Text: The defect edges were debeveled with a #15 scalpel blade. Given the location of the defect, shape of the defect and the proximity to free margins an epidermal autograft was deemed most appropriate. Using a sterile surgical marker, the primary defect shape was transferred to the donor site. The epidermal graft was then harvested.  The skin graft was then placed in the primary defect and oriented appropriately.
Dermal Autograft Text: The defect edges were debeveled with a #15 scalpel blade. Given the location of the defect, shape of the defect and the proximity to free margins a dermal autograft was deemed most appropriate. Using a sterile surgical marker, the primary defect shape was transferred to the donor site. The area thus outlined was incised deep to adipose tissue with a #15 scalpel blade.  The harvested graft was then trimmed of adipose and epidermal tissue until only dermis was left.  The skin graft was then placed in the primary defect and oriented appropriately.
Skin Substitute Text: The defect edges were debeveled with a #15 scalpel blade. Given the location of the defect, shape of the defect and the proximity to free margins a skin substitute graft was deemed most appropriate.  The graft material was trimmed to fit the size of the defect. The graft was then placed in the primary defect and oriented appropriately.
Tissue Cultured Epidermal Autograft Text: The defect edges were debeveled with a #15 scalpel blade. Given the location of the defect, shape of the defect and the proximity to free margins a tissue cultured epidermal autograft was deemed most appropriate.  The graft was then trimmed to fit the size of the defect.  The graft was then placed in the primary defect and oriented appropriately.
Xenograft Text: The defect edges were debeveled with a #15 scalpel blade. Given the location of the defect, shape of the defect and the proximity to free margins a xenograft was deemed most appropriate.  The graft was then trimmed to fit the size of the defect.  The graft was then placed in the primary defect and oriented appropriately.
Purse String (Intermediate) Text: Given the location of the defect and the characteristics of the surrounding skin a purse string intermediate closure was deemed most appropriate.  Undermining was performed circumferentially around the surgical defect.  A purse string suture was then placed and tightened.
Purse String (Simple) Text: Given the location of the defect and the characteristics of the surrounding skin a purse string simple closure was deemed most appropriate.  Undermining was performed circumferentially around the surgical defect.  A purse string suture was then placed and tightened.
Partial Purse String (Intermediate) Text: Given the location of the defect and the characteristics of the surrounding skin an intermediate purse string closure was deemed most appropriate.  Undermining was performed circumferentially around the surgical defect.  A purse string suture was then placed and tightened. Wound tension of the circular defect prevented complete closure of the wound.
Partial Purse String (Simple) Text: Given the location of the defect and the characteristics of the surrounding skin a simple purse string closure was deemed most appropriate.  Undermining was performed circumferentially around the surgical defect.  A purse string suture was then placed and tightened. Wound tension of the circular defect prevented complete closure of the wound.
Complex Repair And Single Advancement Flap Text: The defect edges were debeveled with a #15 scalpel blade.  The primary defect was closed partially with a complex linear closure.  Given the location of the remaining defect, shape of the defect and the proximity to free margins a single advancement flap was deemed most appropriate for complete closure of the defect.  Using a sterile surgical marker, an appropriate advancement flap was drawn incorporating the defect and placing the expected incisions within the relaxed skin tension lines where possible. The area thus outlined was incised deep to adipose tissue with a #15 scalpel blade. The skin margins were undermined to an appropriate distance in all directions utilizing iris scissors and carried over to close the primary defect.
Complex Repair And Double Advancement Flap Text: The defect edges were debeveled with a #15 scalpel blade.  The primary defect was closed partially with a complex linear closure.  Given the location of the remaining defect, shape of the defect and the proximity to free margins a double advancement flap was deemed most appropriate for complete closure of the defect.  Using a sterile surgical marker, an appropriate advancement flap was drawn incorporating the defect and placing the expected incisions within the relaxed skin tension lines where possible. The area thus outlined was incised deep to adipose tissue with a #15 scalpel blade. The skin margins were undermined to an appropriate distance in all directions utilizing iris scissors and carried over to close the primary defect.
Complex Repair And Modified Advancement Flap Text: The defect edges were debeveled with a #15 scalpel blade.  The primary defect was closed partially with a complex linear closure.  Given the location of the remaining defect, shape of the defect and the proximity to free margins a modified advancement flap was deemed most appropriate for complete closure of the defect.  Using a sterile surgical marker, an appropriate advancement flap was drawn incorporating the defect and placing the expected incisions within the relaxed skin tension lines where possible. The area thus outlined was incised deep to adipose tissue with a #15 scalpel blade. The skin margins were undermined to an appropriate distance in all directions utilizing iris scissors and carried over to close the primary defect.
Complex Repair And A-T Advancement Flap Text: The defect edges were debeveled with a #15 scalpel blade.  The primary defect was closed partially with a complex linear closure.  Given the location of the remaining defect, shape of the defect and the proximity to free margins an A-T advancement flap was deemed most appropriate for complete closure of the defect.  Using a sterile surgical marker, an appropriate advancement flap was drawn incorporating the defect and placing the expected incisions within the relaxed skin tension lines where possible. The area thus outlined was incised deep to adipose tissue with a #15 scalpel blade. The skin margins were undermined to an appropriate distance in all directions utilizing iris scissors and carried over to close the primary defect.
Complex Repair And O-T Advancement Flap Text: The defect edges were debeveled with a #15 scalpel blade.  The primary defect was closed partially with a complex linear closure.  Given the location of the remaining defect, shape of the defect and the proximity to free margins an O-T advancement flap was deemed most appropriate for complete closure of the defect.  Using a sterile surgical marker, an appropriate advancement flap was drawn incorporating the defect and placing the expected incisions within the relaxed skin tension lines where possible. The area thus outlined was incised deep to adipose tissue with a #15 scalpel blade. The skin margins were undermined to an appropriate distance in all directions utilizing iris scissors and carried over to close the primary defect.
Complex Repair And O-L Flap Text: The defect edges were debeveled with a #15 scalpel blade.  The primary defect was closed partially with a complex linear closure.  Given the location of the remaining defect, shape of the defect and the proximity to free margins an O-L flap was deemed most appropriate for complete closure of the defect.  Using a sterile surgical marker, an appropriate flap was drawn incorporating the defect and placing the expected incisions within the relaxed skin tension lines where possible. The area thus outlined was incised deep to adipose tissue with a #15 scalpel blade. The skin margins were undermined to an appropriate distance in all directions utilizing iris scissors and carried over to close the primary defect.
Complex Repair And Bilobe Flap Text: The defect edges were debeveled with a #15 scalpel blade.  The primary defect was closed partially with a complex linear closure.  Given the location of the remaining defect, shape of the defect and the proximity to free margins a bilobe flap was deemed most appropriate for complete closure of the defect.  Using a sterile surgical marker, an appropriate advancement flap was drawn incorporating the defect and placing the expected incisions within the relaxed skin tension lines where possible. The area thus outlined was incised deep to adipose tissue with a #15 scalpel blade. The skin margins were undermined to an appropriate distance in all directions utilizing iris scissors and carried over to close the primary defect.
Complex Repair And Melolabial Flap Text: The defect edges were debeveled with a #15 scalpel blade.  The primary defect was closed partially with a complex linear closure.  Given the location of the remaining defect, shape of the defect and the proximity to free margins a melolabial flap was deemed most appropriate for complete closure of the defect.  Using a sterile surgical marker, an appropriate advancement flap was drawn incorporating the defect and placing the expected incisions within the relaxed skin tension lines where possible. The area thus outlined was incised deep to adipose tissue with a #15 scalpel blade. The skin margins were undermined to an appropriate distance in all directions utilizing iris scissors and carried over to close the primary defect.
Complex Repair And Rotation Flap Text: The defect edges were debeveled with a #15 scalpel blade.  The primary defect was closed partially with a complex linear closure.  Given the location of the remaining defect, shape of the defect and the proximity to free margins a rotation flap was deemed most appropriate for complete closure of the defect.  Using a sterile surgical marker, an appropriate advancement flap was drawn incorporating the defect and placing the expected incisions within the relaxed skin tension lines where possible. The area thus outlined was incised deep to adipose tissue with a #15 scalpel blade. The skin margins were undermined to an appropriate distance in all directions utilizing iris scissors and carried over to close the primary defect.
Complex Repair And Rhombic Flap Text: The defect edges were debeveled with a #15 scalpel blade.  The primary defect was closed partially with a complex linear closure.  Given the location of the remaining defect, shape of the defect and the proximity to free margins a rhombic flap was deemed most appropriate for complete closure of the defect.  Using a sterile surgical marker, an appropriate advancement flap was drawn incorporating the defect and placing the expected incisions within the relaxed skin tension lines where possible. The area thus outlined was incised deep to adipose tissue with a #15 scalpel blade. The skin margins were undermined to an appropriate distance in all directions utilizing iris scissors and carried over to close the primary defect.
Complex Repair And Transposition Flap Text: The defect edges were debeveled with a #15 scalpel blade.  The primary defect was closed partially with a complex linear closure.  Given the location of the remaining defect, shape of the defect and the proximity to free margins a transposition flap was deemed most appropriate for complete closure of the defect.  Using a sterile surgical marker, an appropriate advancement flap was drawn incorporating the defect and placing the expected incisions within the relaxed skin tension lines where possible. The area thus outlined was incised deep to adipose tissue with a #15 scalpel blade. The skin margins were undermined to an appropriate distance in all directions utilizing iris scissors and carried over to close the primary defect.
Complex Repair And V-Y Plasty Text: The defect edges were debeveled with a #15 scalpel blade.  The primary defect was closed partially with a complex linear closure.  Given the location of the remaining defect, shape of the defect and the proximity to free margins a V-Y plasty was deemed most appropriate for complete closure of the defect.  Using a sterile surgical marker, an appropriate advancement flap was drawn incorporating the defect and placing the expected incisions within the relaxed skin tension lines where possible. The area thus outlined was incised deep to adipose tissue with a #15 scalpel blade. The skin margins were undermined to an appropriate distance in all directions utilizing iris scissors and carried over to close the primary defect.
Complex Repair And M Plasty Text: The defect edges were debeveled with a #15 scalpel blade.  The primary defect was closed partially with a complex linear closure.  Given the location of the remaining defect, shape of the defect and the proximity to free margins an M plasty was deemed most appropriate for complete closure of the defect.  Using a sterile surgical marker, an appropriate advancement flap was drawn incorporating the defect and placing the expected incisions within the relaxed skin tension lines where possible. The area thus outlined was incised deep to adipose tissue with a #15 scalpel blade. The skin margins were undermined to an appropriate distance in all directions utilizing iris scissors and carried over to close the primary defect.
Complex Repair And Double M Plasty Text: The defect edges were debeveled with a #15 scalpel blade.  The primary defect was closed partially with a complex linear closure.  Given the location of the remaining defect, shape of the defect and the proximity to free margins a double M plasty was deemed most appropriate for complete closure of the defect.  Using a sterile surgical marker, an appropriate advancement flap was drawn incorporating the defect and placing the expected incisions within the relaxed skin tension lines where possible. The area thus outlined was incised deep to adipose tissue with a #15 scalpel blade. The skin margins were undermined to an appropriate distance in all directions utilizing iris scissors and carried over to close the primary defect.
Complex Repair And W Plasty Text: The defect edges were debeveled with a #15 scalpel blade.  The primary defect was closed partially with a complex linear closure.  Given the location of the remaining defect, shape of the defect and the proximity to free margins a W plasty was deemed most appropriate for complete closure of the defect.  Using a sterile surgical marker, an appropriate advancement flap was drawn incorporating the defect and placing the expected incisions within the relaxed skin tension lines where possible. The area thus outlined was incised deep to adipose tissue with a #15 scalpel blade. The skin margins were undermined to an appropriate distance in all directions utilizing iris scissors and carried over to close the primary defect.
Complex Repair And Z Plasty Text: The defect edges were debeveled with a #15 scalpel blade.  The primary defect was closed partially with a complex linear closure.  Given the location of the remaining defect, shape of the defect and the proximity to free margins a Z plasty was deemed most appropriate for complete closure of the defect.  Using a sterile surgical marker, an appropriate advancement flap was drawn incorporating the defect and placing the expected incisions within the relaxed skin tension lines where possible. The area thus outlined was incised deep to adipose tissue with a #15 scalpel blade. The skin margins were undermined to an appropriate distance in all directions utilizing iris scissors and carried over to close the primary defect.
Complex Repair And Dorsal Nasal Flap Text: The defect edges were debeveled with a #15 scalpel blade.  The primary defect was closed partially with a complex linear closure.  Given the location of the remaining defect, shape of the defect and the proximity to free margins a dorsal nasal flap was deemed most appropriate for complete closure of the defect.  Using a sterile surgical marker, an appropriate flap was drawn incorporating the defect and placing the expected incisions within the relaxed skin tension lines where possible. The area thus outlined was incised deep to adipose tissue with a #15 scalpel blade. The skin margins were undermined to an appropriate distance in all directions utilizing iris scissors and carried over to close the primary defect.
Complex Repair And Ftsg Text: The defect edges were debeveled with a #15 scalpel blade.  The primary defect was closed partially with a complex linear closure.  Given the location of the defect, shape of the defect and the proximity to free margins a full thickness skin graft was deemed most appropriate to repair the remaining defect.  The graft was trimmed to fit the size of the remaining defect.  The graft was then placed in the primary defect, oriented appropriately, and sutured into place.
Complex Repair And Burow's Graft Text: The defect edges were debeveled with a #15 scalpel blade.  The primary defect was closed partially with a complex linear closure.  Given the location of the defect, shape of the defect, the proximity to free margins and the presence of a standing cone deformity a Burow's graft was deemed most appropriate to repair the remaining defect.  The graft was trimmed to fit the size of the remaining defect.  The graft was then placed in the primary defect, oriented appropriately, and sutured into place.
Complex Repair And Split-Thickness Skin Graft Text: The defect edges were debeveled with a #15 scalpel blade.  The primary defect was closed partially with a complex linear closure.  Given the location of the defect, shape of the defect and the proximity to free margins a split thickness skin graft was deemed most appropriate to repair the remaining defect.  The graft was trimmed to fit the size of the remaining defect.  The graft was then placed in the primary defect, oriented appropriately, and sutured into place.
Complex Repair And Epidermal Autograft Text: The defect edges were debeveled with a #15 scalpel blade.  The primary defect was closed partially with a complex linear closure.  Given the location of the defect, shape of the defect and the proximity to free margins an epidermal autograft was deemed most appropriate to repair the remaining defect.  The graft was trimmed to fit the size of the remaining defect.  The graft was then placed in the primary defect, oriented appropriately, and sutured into place.
Complex Repair And Dermal Autograft Text: The defect edges were debeveled with a #15 scalpel blade.  The primary defect was closed partially with a complex linear closure.  Given the location of the defect, shape of the defect and the proximity to free margins an dermal autograft was deemed most appropriate to repair the remaining defect.  The graft was trimmed to fit the size of the remaining defect.  The graft was then placed in the primary defect, oriented appropriately, and sutured into place.
Complex Repair And Tissue Cultured Epidermal Autograft Text: The defect edges were debeveled with a #15 scalpel blade.  The primary defect was closed partially with a complex linear closure.  Given the location of the defect, shape of the defect and the proximity to free margins an tissue cultured epidermal autograft was deemed most appropriate to repair the remaining defect.  The graft was trimmed to fit the size of the remaining defect.  The graft was then placed in the primary defect, oriented appropriately, and sutured into place.
Complex Repair And Xenograft Text: The defect edges were debeveled with a #15 scalpel blade.  The primary defect was closed partially with a complex linear closure.  Given the location of the defect, shape of the defect and the proximity to free margins a xenograft was deemed most appropriate to repair the remaining defect.  The graft was trimmed to fit the size of the remaining defect.  The graft was then placed in the primary defect, oriented appropriately, and sutured into place.
Complex Repair And Skin Substitute Graft Text: The defect edges were debeveled with a #15 scalpel blade.  The primary defect was closed partially with a complex linear closure.  Given the location of the remaining defect, shape of the defect and the proximity to free margins a skin substitute graft was deemed most appropriate to repair the remaining defect.  The graft was trimmed to fit the size of the remaining defect.  The graft was then placed in the primary defect, oriented appropriately, and sutured into place.
Path Notes (To The Dermatopathologist): Please check margins. Proximal notch
Consent was obtained from the patient. The risks and benefits to therapy were discussed in detail. Specifically, the risks of infection, scarring, bleeding, prolonged wound healing, incomplete removal, allergy to anesthesia, nerve injury and recurrence were addressed. Prior to the procedure, the treatment site was clearly identified and confirmed by the patient. All components of Universal Protocol/PAUSE Rule completed.
Post-Care Instructions: I reviewed with the patient in detail post-care instructions. Patient is not to engage in any heavy lifting, exercise, or swimming for the next 14 days. Should the patient develop any fevers, chills, bleeding, severe pain patient will contact the office immediately.
Home Suture Removal Text: Patient was provided a home suture removal kit and will remove their sutures at home.  If they have any questions or difficulties they will call the office.
Where Do You Want The Question To Include Opioid Counseling Located?: Case Summary Tab
Information: Selecting Yes will display possible errors in your note based on the variables you have selected. This validation is only offered as a suggestion for you. PLEASE NOTE THAT THE VALIDATION TEXT WILL BE REMOVED WHEN YOU FINALIZE YOUR NOTE. IF YOU WANT TO FAX A PRELIMINARY NOTE YOU WILL NEED TO TOGGLE THIS TO 'NO' IF YOU DO NOT WANT IT IN YOUR FAXED NOTE.

## 2024-11-11 ENCOUNTER — APPOINTMENT (RX ONLY)
Dept: URBAN - NONMETROPOLITAN AREA CLINIC 42 | Facility: CLINIC | Age: 84
Setting detail: DERMATOLOGY
End: 2024-11-11

## 2024-11-11 DIAGNOSIS — Z48.02 ENCOUNTER FOR REMOVAL OF SUTURES: ICD-10-CM

## 2024-11-11 PROCEDURE — ? POST-OP WOUND EVALUATION

## 2024-11-11 PROCEDURE — ? SUTURE REMOVAL (GLOBAL PERIOD)

## 2024-11-11 PROCEDURE — ? PHOTO-DOCUMENTATION

## 2024-11-11 ASSESSMENT — LOCATION ZONE DERM: LOCATION ZONE: ARM

## 2024-11-11 ASSESSMENT — LOCATION DETAILED DESCRIPTION DERM: LOCATION DETAILED: RIGHT PROXIMAL DORSAL FOREARM

## 2024-11-11 ASSESSMENT — LOCATION SIMPLE DESCRIPTION DERM: LOCATION SIMPLE: RIGHT FOREARM

## 2024-11-11 NOTE — PROCEDURE: SUTURE REMOVAL (GLOBAL PERIOD)
Detail Level: Detailed
Add 23037 Cpt? (Important Note: In 2017 The Use Of 24570 Is Being Tracked By Cms To Determine Future Global Period Reimbursement For Global Periods): no

## 2024-11-11 NOTE — PROCEDURE: POST-OP WOUND EVALUATION
Detail Level: Detailed
Add 41460 Cpt? (Important Note: In 2017 The Use Of 26388 Is Being Tracked By Cms To Determine Future Global Period Reimbursement For Global Periods): no
Wound Evaluated By (Optional): Adriano DURON
Wound Diameter In Cm(Optional): 0
Wound Crusting?: crusted
Sutures?: intact
Wound Color?: pink

## 2024-12-16 ENCOUNTER — APPOINTMENT (OUTPATIENT)
Dept: URBAN - NONMETROPOLITAN AREA CLINIC 42 | Facility: CLINIC | Age: 84
Setting detail: DERMATOLOGY
End: 2024-12-16

## 2024-12-16 DIAGNOSIS — Z48.817 ENCOUNTER FOR SURGICAL AFTERCARE FOLLOWING SURGERY ON THE SKIN AND SUBCUTANEOUS TISSUE: ICD-10-CM

## 2024-12-16 DIAGNOSIS — Z71.89 OTHER SPECIFIED COUNSELING: ICD-10-CM

## 2024-12-16 DIAGNOSIS — L82.0 INFLAMED SEBORRHEIC KERATOSIS: ICD-10-CM

## 2024-12-16 DIAGNOSIS — Z12.83 ENCOUNTER FOR SCREENING FOR MALIGNANT NEOPLASM OF SKIN: ICD-10-CM

## 2024-12-16 DIAGNOSIS — L57.8 OTHER SKIN CHANGES DUE TO CHRONIC EXPOSURE TO NONIONIZING RADIATION: ICD-10-CM

## 2024-12-16 PROCEDURE — 17110 DESTRUCTION B9 LES UP TO 14: CPT

## 2024-12-16 PROCEDURE — ? LIQUID NITROGEN

## 2024-12-16 PROCEDURE — 99213 OFFICE O/P EST LOW 20 MIN: CPT | Mod: 25

## 2024-12-16 PROCEDURE — ? COUNSELING

## 2024-12-16 PROCEDURE — ? POST-OP WOUND EVALUATION

## 2024-12-16 ASSESSMENT — LOCATION SIMPLE DESCRIPTION DERM
LOCATION SIMPLE: LEFT UPPER ARM
LOCATION SIMPLE: LEFT FOREARM
LOCATION SIMPLE: RIGHT FOREARM

## 2024-12-16 ASSESSMENT — LOCATION DETAILED DESCRIPTION DERM
LOCATION DETAILED: LEFT PROXIMAL DORSAL FOREARM
LOCATION DETAILED: LEFT LATERAL DISTAL UPPER ARM
LOCATION DETAILED: RIGHT PROXIMAL DORSAL FOREARM

## 2024-12-16 ASSESSMENT — LOCATION ZONE DERM: LOCATION ZONE: ARM

## 2024-12-16 NOTE — PROCEDURE: LIQUID NITROGEN
Spray Paint Text: The liquid nitrogen was applied to the skin utilizing a spray paint frosting technique.
Show Spray Paint Technique Variable?: Yes
Post-Care Instructions: I reviewed with the patient in detail post-care instructions. Patient is to wear sunprotection, and avoid picking at any of the treated lesions. Pt may apply Vaseline to crusted or scabbing areas.
Medical Necessity Information: It is in your best interest to select a reason for this procedure from the list below. All of these items fulfill various CMS LCD requirements except the new and changing color options.
Add 52 Modifier (Optional): no
Number Of Freeze-Thaw Cycles: 2 freeze-thaw cycles
Consent: The patient's consent was obtained including but not limited to risks of crusting, scabbing, blistering, scarring, darker or lighter pigmentary change, recurrence, incomplete removal and infection.
Detail Level: Detailed
Medical Necessity Clause: This procedure was medically necessary because the lesions that were treated were:

## 2024-12-16 NOTE — PROCEDURE: POST-OP WOUND EVALUATION
Detail Level: Detailed
Wound Evaluated By (Optional): Adriano Solis
Wound Diameter In Cm(Optional): 0
Wound Crusting?: clean
Wound Color?: pink

## 2025-05-14 ENCOUNTER — APPOINTMENT (OUTPATIENT)
Age: 85
Setting detail: DERMATOLOGY
End: 2025-05-14

## 2025-05-14 DIAGNOSIS — Z12.83 ENCOUNTER FOR SCREENING FOR MALIGNANT NEOPLASM OF SKIN: ICD-10-CM

## 2025-05-14 DIAGNOSIS — D22 MELANOCYTIC NEVI: ICD-10-CM

## 2025-05-14 DIAGNOSIS — L57.0 ACTINIC KERATOSIS: ICD-10-CM

## 2025-05-14 DIAGNOSIS — L57.8 OTHER SKIN CHANGES DUE TO CHRONIC EXPOSURE TO NONIONIZING RADIATION: ICD-10-CM

## 2025-05-14 DIAGNOSIS — Z85.828 PERSONAL HISTORY OF OTHER MALIGNANT NEOPLASM OF SKIN: ICD-10-CM

## 2025-05-14 DIAGNOSIS — Z71.89 OTHER SPECIFIED COUNSELING: ICD-10-CM

## 2025-05-14 DIAGNOSIS — L82.1 OTHER SEBORRHEIC KERATOSIS: ICD-10-CM

## 2025-05-14 PROBLEM — D22.39 MELANOCYTIC NEVI OF OTHER PARTS OF FACE: Status: ACTIVE | Noted: 2025-05-14

## 2025-05-14 PROCEDURE — 99213 OFFICE O/P EST LOW 20 MIN: CPT | Mod: 25

## 2025-05-14 PROCEDURE — 17003 DESTRUCT PREMALG LES 2-14: CPT

## 2025-05-14 PROCEDURE — 17000 DESTRUCT PREMALG LESION: CPT

## 2025-05-14 PROCEDURE — ? LIQUID NITROGEN

## 2025-05-14 PROCEDURE — ? COUNSELING

## 2025-05-14 ASSESSMENT — LOCATION ZONE DERM
LOCATION ZONE: TRUNK
LOCATION ZONE: ARM
LOCATION ZONE: HAND
LOCATION ZONE: FACE

## 2025-05-14 ASSESSMENT — LOCATION SIMPLE DESCRIPTION DERM
LOCATION SIMPLE: ABDOMEN
LOCATION SIMPLE: RIGHT FOREARM
LOCATION SIMPLE: RIGHT CHEEK
LOCATION SIMPLE: LEFT CHEEK
LOCATION SIMPLE: LEFT FOREARM
LOCATION SIMPLE: LEFT FOREHEAD
LOCATION SIMPLE: UPPER BACK
LOCATION SIMPLE: LEFT HAND

## 2025-05-14 ASSESSMENT — LOCATION DETAILED DESCRIPTION DERM
LOCATION DETAILED: LEFT CENTRAL MALAR CHEEK
LOCATION DETAILED: RIGHT SUPERIOR MEDIAL MALAR CHEEK
LOCATION DETAILED: LEFT RIB CAGE
LOCATION DETAILED: RIGHT PROXIMAL DORSAL FOREARM
LOCATION DETAILED: 3RD WEB SPACE LEFT HAND
LOCATION DETAILED: LEFT PROXIMAL DORSAL FOREARM
LOCATION DETAILED: INFERIOR THORACIC SPINE
LOCATION DETAILED: LEFT SUPERIOR FOREHEAD